# Patient Record
Sex: FEMALE | Race: WHITE | NOT HISPANIC OR LATINO | Employment: OTHER | URBAN - METROPOLITAN AREA
[De-identification: names, ages, dates, MRNs, and addresses within clinical notes are randomized per-mention and may not be internally consistent; named-entity substitution may affect disease eponyms.]

---

## 2017-03-23 ENCOUNTER — LAB (OUTPATIENT)
Dept: ENDOCRINOLOGY | Age: 62
End: 2017-03-23

## 2017-03-23 DIAGNOSIS — E03.9 HYPOTHYROIDISM: ICD-10-CM

## 2017-03-23 DIAGNOSIS — E78.5 DYSLIPIDEMIA: ICD-10-CM

## 2017-03-23 DIAGNOSIS — I10 BENIGN ESSENTIAL HYPERTENSION: ICD-10-CM

## 2017-03-23 DIAGNOSIS — E11.9 CONTROLLED TYPE 2 DIABETES MELLITUS WITHOUT COMPLICATION (HCC): ICD-10-CM

## 2017-03-23 DIAGNOSIS — E55.9 VITAMIN D DEFICIENCY: ICD-10-CM

## 2017-03-23 DIAGNOSIS — E55.9 VITAMIN D DEFICIENCY: Primary | ICD-10-CM

## 2017-03-24 LAB
25(OH)D3+25(OH)D2 SERPL-MCNC: 51.6 NG/ML (ref 30–100)
ALBUMIN SERPL-MCNC: 4.8 G/DL (ref 3.5–5.2)
ALBUMIN/GLOB SERPL: 2 G/DL
ALP SERPL-CCNC: 84 U/L (ref 39–117)
ALT SERPL-CCNC: 28 U/L (ref 1–33)
AST SERPL-CCNC: 17 U/L (ref 1–32)
BILIRUB SERPL-MCNC: 0.8 MG/DL (ref 0.1–1.2)
BUN SERPL-MCNC: 14 MG/DL (ref 8–23)
BUN/CREAT SERPL: 20.6 (ref 7–25)
C PEPTIDE SERPL-MCNC: 3.4 NG/ML (ref 1.1–4.4)
CALCIUM SERPL-MCNC: 9.8 MG/DL (ref 8.6–10.5)
CHLORIDE SERPL-SCNC: 99 MMOL/L (ref 98–107)
CHOLEST SERPL-MCNC: 259 MG/DL (ref 0–200)
CO2 SERPL-SCNC: 24.2 MMOL/L (ref 22–29)
CREAT SERPL-MCNC: 0.68 MG/DL (ref 0.57–1)
GLOBULIN SER CALC-MCNC: 2.4 GM/DL
GLUCOSE SERPL-MCNC: 197 MG/DL (ref 65–99)
HBA1C MFR BLD: 7.86 % (ref 4.8–5.6)
HDLC SERPL-MCNC: 48 MG/DL (ref 40–60)
LDLC SERPL CALC-MCNC: 173 MG/DL (ref 0–100)
MICROALBUMIN UR-MCNC: 3.2 UG/ML
POTASSIUM SERPL-SCNC: 4.2 MMOL/L (ref 3.5–5.2)
PROT SERPL-MCNC: 7.2 G/DL (ref 6–8.5)
SODIUM SERPL-SCNC: 140 MMOL/L (ref 136–145)
T3FREE SERPL-MCNC: 3.1 PG/ML (ref 2–4.4)
T4 FREE SERPL-MCNC: 1.65 NG/DL (ref 0.93–1.7)
T4 SERPL-MCNC: 9.91 MCG/DL (ref 4.5–11.7)
TRIGL SERPL-MCNC: 188 MG/DL (ref 0–150)
TSH SERPL DL<=0.005 MIU/L-ACNC: 0.27 MIU/ML (ref 0.27–4.2)
URATE SERPL-MCNC: 2.9 MG/DL (ref 2.4–5.7)
VLDLC SERPL CALC-MCNC: 37.6 MG/DL (ref 5–40)

## 2017-04-04 ENCOUNTER — OFFICE VISIT (OUTPATIENT)
Dept: ENDOCRINOLOGY | Age: 62
End: 2017-04-04

## 2017-04-04 VITALS
DIASTOLIC BLOOD PRESSURE: 68 MMHG | HEIGHT: 67 IN | WEIGHT: 155.2 LBS | BODY MASS INDEX: 24.36 KG/M2 | SYSTOLIC BLOOD PRESSURE: 138 MMHG

## 2017-04-04 DIAGNOSIS — Z79.4 CONTROLLED TYPE 2 DIABETES MELLITUS WITHOUT COMPLICATION, WITH LONG-TERM CURRENT USE OF INSULIN (HCC): Primary | ICD-10-CM

## 2017-04-04 DIAGNOSIS — I10 BENIGN ESSENTIAL HYPERTENSION: ICD-10-CM

## 2017-04-04 DIAGNOSIS — E78.5 DYSLIPIDEMIA: ICD-10-CM

## 2017-04-04 DIAGNOSIS — E11.9 CONTROLLED TYPE 2 DIABETES MELLITUS WITHOUT COMPLICATION, WITH LONG-TERM CURRENT USE OF INSULIN (HCC): Primary | ICD-10-CM

## 2017-04-04 DIAGNOSIS — E55.9 VITAMIN D DEFICIENCY: ICD-10-CM

## 2017-04-04 DIAGNOSIS — E03.9 HYPOTHYROIDISM, UNSPECIFIED TYPE: ICD-10-CM

## 2017-04-04 PROCEDURE — 99214 OFFICE O/P EST MOD 30 MIN: CPT | Performed by: NURSE PRACTITIONER

## 2017-04-04 RX ORDER — SIMVASTATIN 20 MG
20 TABLET ORAL NIGHTLY
Qty: 30 TABLET | Refills: 5 | Status: SHIPPED | OUTPATIENT
Start: 2017-04-04 | End: 2017-08-28

## 2017-04-04 RX ORDER — GLIMEPIRIDE 4 MG/1
4 TABLET ORAL
Qty: 30 TABLET | Refills: 5 | Status: SHIPPED | OUTPATIENT
Start: 2017-04-04 | End: 2017-09-26 | Stop reason: SDUPTHER

## 2017-04-04 NOTE — PATIENT INSTRUCTIONS
Change rosuvastatin to simvastatin 20 mg daily  Glimepiride 4mg 1 pill daily in the morning with breakfast  Monitor for low blood sugar  Follow up in 4 months with labs

## 2017-04-04 NOTE — PROGRESS NOTES
"Subjective   Magui Dumont is a 61 y.o. female is here today for follow-up.  Chief Complaint   Patient presents with   • Diabetes     recent labs; didnt bring BG monitor; test BG 1-2XW   • Hypothyroidism     pt is not taking crestor due to price   • Hyperlipidemia     poss want refill on proctosol and get a cheaper statin   • Hypertension   • Vitamin D Deficiency     /68  Ht 67\" (170.2 cm)  Wt 155 lb 3.2 oz (70.4 kg)  BMI 24.31 kg/m2  History reviewed. No pertinent family history.  Social History     Social History   • Marital status: Single     Spouse name: N/A   • Number of children: N/A   • Years of education: N/A     Social History Main Topics   • Smoking status: Never Smoker   • Smokeless tobacco: None   • Alcohol use No   • Drug use: Defer   • Sexual activity: Defer     Other Topics Concern   • None     Social History Narrative     History reviewed. No pertinent surgical history.  Past Medical History:   Diagnosis Date   • Hyperlipidemia    • Hypothyroidism    • Type 2 diabetes mellitus    • Vitamin D deficiency      Current Outpatient Prescriptions on File Prior to Visit   Medication Sig   • aspirin 81 MG tablet Take  by mouth daily.   • citalopram (CeleXA) 20 MG tablet Take 1 tablet by mouth daily.   • Empagliflozin (JARDIANCE) 25 MG tablet Take 1 tablet by mouth daily.   • glucose blood test strip OneTouch Verio In Vitro Strip; Patient Sig: OneTouch Verio In Vitro Strip CHECK BLOOD GLUCOSE 1 TIME A DAY AS DIRECTED; 3; 3; 01-Dec-2014; Active   • hydrocortisone 2.5 % cream Hydrocortisone 2.5 % Rectal Cream; Patient Sig: Hydrocortisone 2.5 % Rectal Cream Plan locally twice a day x2 weeks; 1; 3; 21-May-2015; Active   • JENTADUETO 2.5-1000 MG tablet Take 1 tablet by mouth 2 (two) times a day.   • levothyroxine (SYNTHROID, LEVOTHROID) 112 MCG tablet Take 1 tablet by mouth daily.   • lisinopril-hydrochlorothiazide (PRINZIDE,ZESTORETIC) 20-12.5 MG per tablet Take 1 tablet by mouth daily.   • Omega-3 Fatty " Acids (FISH OIL) 1000 MG capsule delayed-release Take  by mouth daily.   • ONETOUCH DELICA LANCETS FINE misc    • PROCTOSOL HC 2.5 % rectal cream    • vitamin D (ERGOCALCIFEROL) 37617 UNITS capsule capsule Take 1 capsule by mouth every 7 days.   • rosuvastatin (CRESTOR) 20 MG tablet Take 1 tablet by mouth daily.   • [DISCONTINUED] Doxycycline Hyclate 50 MG tablet Take  by mouth.   • [DISCONTINUED] ESTRACE VAGINAL 0.1 MG/GM vaginal cream Insert 1 g into the vagina daily.     No current facility-administered medications on file prior to visit.          History of Present Illness  Encounter Diagnoses   Name Primary?   • Controlled type 2 diabetes mellitus without complication, with long-term current use of insulin Yes   • Hypothyroidism, unspecified type    • Benign essential hypertension    • Vitamin D deficiency    • Dyslipidemia      This is a 62 y/o female patient for a follow up visit for the above-mentioned problems.  She had recent labs which were reviewed and discussed. She states she checks her sugars 1-2 times a week and that she has not been very good at checking them, but when she does check them they have bee high in the morning. She reports that she has not been taking her Crestor as prescribed because it is too expensive.  She states that it was changed to the generic rosuvastatin at her last visit, but that the medication is still too expensive.  Her  takes simvastatin and she is requesting that we change her rosuvastatin to simvastatin at this time. The Patient is reluctant to start insulin therapy at Today's visit.  Her blood pressure is stable today and she states she takes all other medications as precribed.     The following portions of the patient's history were reviewed and updated as appropriate: allergies, current medications, past family history, past medical history, past social history, past surgical history and problem list.    Review of Systems   Constitutional: Negative for  fatigue.   HENT: Negative for trouble swallowing.    Eyes: Negative for visual disturbance.   Respiratory: Negative for shortness of breath.    Cardiovascular: Negative for leg swelling.   Gastrointestinal: Negative for nausea.   Endocrine: Negative for polyuria.   Genitourinary: Negative for urgency.   Musculoskeletal: Negative for joint swelling.   Skin: Negative for wound.   Allergic/Immunologic: Negative for immunocompromised state.   Neurological: Negative for numbness.   Hematological: Negative for adenopathy.   Psychiatric/Behavioral: The patient is not nervous/anxious.        Objective   Physical Exam   Constitutional: She is oriented to person, place, and time. She appears well-developed and well-nourished. No distress.   HENT:   Head: Normocephalic and atraumatic.   Right Ear: External ear normal.   Left Ear: External ear normal.   Nose: Nose normal.   Mouth/Throat: Oropharynx is clear and moist. No oropharyngeal exudate.   Eyes: EOM are normal. Pupils are equal, round, and reactive to light. Right eye exhibits no discharge. Left eye exhibits no discharge.   Neck: Trachea normal, normal range of motion and full passive range of motion without pain. Neck supple. No tracheal tenderness present. Carotid bruit is not present. No tracheal deviation, no edema and no erythema present. No thyroid mass and no thyromegaly present.   Cardiovascular: Normal rate, regular rhythm, normal heart sounds and intact distal pulses.  Exam reveals no gallop and no friction rub.    No murmur heard.  Pulmonary/Chest: Effort normal and breath sounds normal. No stridor. No respiratory distress. She has no wheezes. She has no rales.   Abdominal: Soft. Bowel sounds are normal. She exhibits no distension.   Musculoskeletal: Normal range of motion. She exhibits no edema or deformity.   Lymphadenopathy:     She has no cervical adenopathy.   Neurological: She is alert and oriented to person, place, and time.   Skin: Skin is warm and dry.  No rash noted. She is not diaphoretic. No erythema. No pallor.   Psychiatric: She has a normal mood and affect. Her behavior is normal. Judgment and thought content normal.   Nursing note and vitals reviewed.    Results for orders placed or performed in visit on 03/23/17   Vitamin D 25 Hydroxy   Result Value Ref Range    25 Hydroxy, Vitamin D 51.6 30.0 - 100.0 ng/mL     Lab Results   Component Value Date    BUN 14 03/23/2017    CREATININE 0.68 03/23/2017    EGFRIFNONA 88 03/23/2017    EGFRIFAFRI 106 03/23/2017    BCR 20.6 03/23/2017    K 4.2 03/23/2017    CO2 24.2 03/23/2017    CALCIUM 9.8 03/23/2017    PROTENTOTREF 7.2 03/23/2017    ALBUMIN 4.80 03/23/2017    LABIL2 2.0 03/23/2017    AST 17 03/23/2017    ALT 28 03/23/2017     Lab Results   Component Value Date    HGBA1C 7.86 (H) 03/23/2017     No results found for: CHOL  Lab Results   Component Value Date    TRIG 188 (H) 03/23/2017    TRIG 159 (H) 03/03/2016    TRIG 162 (H) 06/25/2015     Lab Results   Component Value Date    HDL 48 03/23/2017    HDL 50 03/03/2016    HDL 50 06/25/2015     No results found for: LDLCALC  Lab Results   Component Value Date     (H) 03/23/2017    LDL 74 03/03/2016    LDL 71 06/25/2015     No results found for: HDLLDLRATIO  No components found for: CHOLHDL  Lab Results   Component Value Date    TSH 0.272 03/23/2017    V8UGNAI 9.91 03/23/2017         Assessment/Plan   Magui was seen today for diabetes, hypothyroidism, hyperlipidemia, hypertension and vitamin d deficiency.    Diagnoses and all orders for this visit:    Controlled type 2 diabetes mellitus without complication, with long-term current use of insulin    Hypothyroidism, unspecified type    Benign essential hypertension    Vitamin D deficiency    Dyslipidemia    Other orders  -     simvastatin (ZOCOR) 20 MG tablet; Take 1 tablet by mouth Every Night.  -     glimepiride (AMARYL) 4 MG tablet; Take 1 tablet by mouth Every Morning Before Breakfast.    In summary, the patient  was seen and examined. Her recent labs were reviewed and she was provided copy. Her hemoglobin A1c has come down from 8.2 to 7.86 on her current regimen. Since she states that her blood sugars are typically running high and she is reluctant to begin insulin therapy, I started her on glimepiride 4 mg 1 tablet every morning with breakfast.  I have warned her of the hypoglycemic risk with this medication and asked that she monitor sugars closely.  Per her request, I have changed her from rosuvastatin 20 mg to simvastatin 20 mg 1 tablet by mouth daily.  Her thyroid and vitamin D are within satisfactory range at this time she is to continue taking her current thyroid and vitamin D therapy.  Her blood pressure is stable at today's visit and I've asked that she continue all other medications.  She is to follow-up in 4 months with labs prior.  I've encouraged her to contact the office with any further questions or concerns prior to her next appointment.  Her medications at the end of the visit are as follows:    Current Outpatient Prescriptions:   •  aspirin 81 MG tablet, Take  by mouth daily., Disp: , Rfl:   •  citalopram (CeleXA) 20 MG tablet, Take 1 tablet by mouth daily., Disp: 90 tablet, Rfl: 3  •  Empagliflozin (JARDIANCE) 25 MG tablet, Take 1 tablet by mouth daily., Disp: 30 tablet, Rfl: 5  •  glucose blood test strip, OneTouch Verio In Vitro Strip; Patient Sig: OneTouch Verio In Vitro Strip CHECK BLOOD GLUCOSE 1 TIME A DAY AS DIRECTED; 3; 3; 01-Dec-2014; Active, Disp: , Rfl:   •  hydrocortisone 2.5 % cream, Hydrocortisone 2.5 % Rectal Cream; Patient Sig: Hydrocortisone 2.5 % Rectal Cream Plan locally twice a day x2 weeks; 1; 3; 21-May-2015; Active, Disp: , Rfl:   •  JENTADUETO 2.5-1000 MG tablet, Take 1 tablet by mouth 2 (two) times a day., Disp: 60 tablet, Rfl: 5  •  levothyroxine (SYNTHROID, LEVOTHROID) 112 MCG tablet, Take 1 tablet by mouth daily., Disp: 90 tablet, Rfl: 3  •  lisinopril-hydrochlorothiazide  (PRINZIDE,ZESTORETIC) 20-12.5 MG per tablet, Take 1 tablet by mouth daily., Disp: 90 tablet, Rfl: 3  •  Omega-3 Fatty Acids (FISH OIL) 1000 MG capsule delayed-release, Take  by mouth daily., Disp: , Rfl:   •  ONETOUCH DELICA LANCETS FINE misc, , Disp: , Rfl:   •  PROCTOSOL HC 2.5 % rectal cream, , Disp: , Rfl:   •  vitamin D (ERGOCALCIFEROL) 60253 UNITS capsule capsule, Take 1 capsule by mouth every 7 days., Disp: 13 capsule, Rfl: 3  •  glimepiride (AMARYL) 4 MG tablet, Take 1 tablet by mouth Every Morning Before Breakfast., Disp: 30 tablet, Rfl: 5  •  simvastatin (ZOCOR) 20 MG tablet, Take 1 tablet by mouth Every Night., Disp: 30 tablet, Rfl: 5    Change rosuvastatin to simvastatin 20 mg daily  Glimepiride 4mg 1 pill daily in the morning with breakfast  Monitor for low blood sugar  Follow up in 4 months with labs

## 2017-04-06 DIAGNOSIS — E55.9 VITAMIN D DEFICIENCY: ICD-10-CM

## 2017-04-06 DIAGNOSIS — E78.5 DYSLIPIDEMIA: ICD-10-CM

## 2017-04-06 DIAGNOSIS — I10 BENIGN ESSENTIAL HYPERTENSION: ICD-10-CM

## 2017-04-06 DIAGNOSIS — E11.9 CONTROLLED TYPE 2 DIABETES MELLITUS WITHOUT COMPLICATION (HCC): ICD-10-CM

## 2017-04-06 DIAGNOSIS — E03.9 HYPOTHYROIDISM: ICD-10-CM

## 2017-04-06 RX ORDER — ERGOCALCIFEROL 1.25 MG/1
CAPSULE ORAL
Qty: 13 CAPSULE | Refills: 0 | Status: SHIPPED | OUTPATIENT
Start: 2017-04-06 | End: 2017-06-19 | Stop reason: SDUPTHER

## 2017-04-21 DIAGNOSIS — I10 BENIGN ESSENTIAL HYPERTENSION: ICD-10-CM

## 2017-04-21 DIAGNOSIS — E78.5 DYSLIPIDEMIA: ICD-10-CM

## 2017-04-21 DIAGNOSIS — E55.9 VITAMIN D DEFICIENCY: ICD-10-CM

## 2017-04-21 DIAGNOSIS — E03.9 HYPOTHYROIDISM: ICD-10-CM

## 2017-04-21 DIAGNOSIS — E11.9 CONTROLLED TYPE 2 DIABETES MELLITUS WITHOUT COMPLICATION (HCC): ICD-10-CM

## 2017-04-24 RX ORDER — LEVOTHYROXINE SODIUM 112 UG/1
TABLET ORAL
Qty: 90 TABLET | Refills: 0 | Status: SHIPPED | OUTPATIENT
Start: 2017-04-24 | End: 2017-08-07 | Stop reason: SDUPTHER

## 2017-05-02 ENCOUNTER — PREP FOR SURGERY (OUTPATIENT)
Dept: OTHER | Facility: HOSPITAL | Age: 62
End: 2017-05-02

## 2017-05-02 DIAGNOSIS — Z12.11 SCREEN FOR COLON CANCER: Primary | ICD-10-CM

## 2017-05-02 RX ORDER — SODIUM CHLORIDE 0.9 % (FLUSH) 0.9 %
1-10 SYRINGE (ML) INJECTION AS NEEDED
Status: CANCELLED | OUTPATIENT
Start: 2017-09-15

## 2017-05-23 RX ORDER — CITALOPRAM 20 MG/1
TABLET ORAL
Qty: 90 TABLET | Refills: 0 | Status: SHIPPED | OUTPATIENT
Start: 2017-05-23 | End: 2017-10-03 | Stop reason: SDUPTHER

## 2017-05-26 ENCOUNTER — OFFICE VISIT (OUTPATIENT)
Dept: INTERNAL MEDICINE | Age: 62
End: 2017-05-26

## 2017-05-26 VITALS
SYSTOLIC BLOOD PRESSURE: 130 MMHG | HEART RATE: 92 BPM | TEMPERATURE: 97.3 F | WEIGHT: 156.4 LBS | OXYGEN SATURATION: 96 % | BODY MASS INDEX: 24.55 KG/M2 | HEIGHT: 67 IN | DIASTOLIC BLOOD PRESSURE: 74 MMHG

## 2017-05-26 DIAGNOSIS — I10 BENIGN ESSENTIAL HYPERTENSION: Primary | ICD-10-CM

## 2017-05-26 DIAGNOSIS — Z00.00 ROUTINE HEALTH MAINTENANCE: ICD-10-CM

## 2017-05-26 DIAGNOSIS — Z23 ENCOUNTER FOR IMMUNIZATION: ICD-10-CM

## 2017-05-26 PROCEDURE — 99213 OFFICE O/P EST LOW 20 MIN: CPT | Performed by: INTERNAL MEDICINE

## 2017-05-26 PROCEDURE — 90471 IMMUNIZATION ADMIN: CPT | Performed by: INTERNAL MEDICINE

## 2017-05-26 PROCEDURE — 90715 TDAP VACCINE 7 YRS/> IM: CPT | Performed by: INTERNAL MEDICINE

## 2017-06-19 DIAGNOSIS — E55.9 VITAMIN D DEFICIENCY: ICD-10-CM

## 2017-06-19 DIAGNOSIS — E78.5 DYSLIPIDEMIA: ICD-10-CM

## 2017-06-19 DIAGNOSIS — E11.9 CONTROLLED TYPE 2 DIABETES MELLITUS WITHOUT COMPLICATION, UNSPECIFIED LONG TERM INSULIN USE STATUS: ICD-10-CM

## 2017-06-19 DIAGNOSIS — E03.9 HYPOTHYROIDISM, UNSPECIFIED TYPE: ICD-10-CM

## 2017-06-19 DIAGNOSIS — I10 BENIGN ESSENTIAL HYPERTENSION: ICD-10-CM

## 2017-06-19 RX ORDER — ERGOCALCIFEROL 1.25 MG/1
CAPSULE ORAL
Qty: 13 CAPSULE | Refills: 3 | Status: SHIPPED | OUTPATIENT
Start: 2017-06-19 | End: 2018-06-30 | Stop reason: SDUPTHER

## 2017-07-21 ENCOUNTER — APPOINTMENT (OUTPATIENT)
Dept: GENERAL RADIOLOGY | Facility: HOSPITAL | Age: 62
End: 2017-07-21

## 2017-07-21 PROCEDURE — 73030 X-RAY EXAM OF SHOULDER: CPT | Performed by: GENERAL PRACTICE

## 2017-07-31 ENCOUNTER — TELEPHONE (OUTPATIENT)
Dept: INTERNAL MEDICINE | Age: 62
End: 2017-07-31

## 2017-07-31 ENCOUNTER — CLINICAL SUPPORT (OUTPATIENT)
Dept: INTERNAL MEDICINE | Age: 62
End: 2017-07-31

## 2017-07-31 DIAGNOSIS — Z00.00 HEALTH CARE MAINTENANCE: Primary | ICD-10-CM

## 2017-07-31 DIAGNOSIS — Z00.00 ROUTINE HEALTH MAINTENANCE: Primary | ICD-10-CM

## 2017-07-31 PROCEDURE — 86580 TB INTRADERMAL TEST: CPT | Performed by: INTERNAL MEDICINE

## 2017-07-31 NOTE — TELEPHONE ENCOUNTER
NATI Ms. Nieves was instructed by me to call pt and place her on [facility] for TB test per Dr. Dumont  Ms. Nieves verbalized understanding. MM

## 2017-07-31 NOTE — TELEPHONE ENCOUNTER
I spoke c pt myself after clarification of cell number. Pt will be here today between [id number]. Order will be placed in system. TYRELL

## 2017-08-02 ENCOUNTER — CLINICAL SUPPORT (OUTPATIENT)
Dept: INTERNAL MEDICINE | Age: 62
End: 2017-08-02

## 2017-08-02 DIAGNOSIS — Z00.00 ROUTINE HEALTH MAINTENANCE: Primary | ICD-10-CM

## 2017-08-02 LAB
INDURATION: 0 MM (ref 0–10)
TB SKIN TEST: NEGATIVE

## 2017-08-04 DIAGNOSIS — E55.9 VITAMIN D DEFICIENCY: ICD-10-CM

## 2017-08-04 DIAGNOSIS — E03.9 HYPOTHYROIDISM, UNSPECIFIED TYPE: ICD-10-CM

## 2017-08-04 DIAGNOSIS — Z79.4 CONTROLLED TYPE 2 DIABETES MELLITUS WITHOUT COMPLICATION, WITH LONG-TERM CURRENT USE OF INSULIN (HCC): Primary | ICD-10-CM

## 2017-08-04 DIAGNOSIS — E11.9 CONTROLLED TYPE 2 DIABETES MELLITUS WITHOUT COMPLICATION, WITH LONG-TERM CURRENT USE OF INSULIN (HCC): Primary | ICD-10-CM

## 2017-08-07 DIAGNOSIS — E55.9 VITAMIN D DEFICIENCY: ICD-10-CM

## 2017-08-07 DIAGNOSIS — I10 BENIGN ESSENTIAL HYPERTENSION: ICD-10-CM

## 2017-08-07 DIAGNOSIS — E11.9 CONTROLLED TYPE 2 DIABETES MELLITUS WITHOUT COMPLICATION (HCC): ICD-10-CM

## 2017-08-07 DIAGNOSIS — E03.9 HYPOTHYROIDISM: ICD-10-CM

## 2017-08-07 DIAGNOSIS — E78.5 DYSLIPIDEMIA: ICD-10-CM

## 2017-08-07 RX ORDER — LEVOTHYROXINE SODIUM 112 UG/1
TABLET ORAL
Qty: 90 TABLET | Refills: 0 | Status: SHIPPED | OUTPATIENT
Start: 2017-08-07 | End: 2017-11-01 | Stop reason: SDUPTHER

## 2017-08-25 ENCOUNTER — OFFICE VISIT (OUTPATIENT)
Dept: ENDOCRINOLOGY | Age: 62
End: 2017-08-25

## 2017-08-25 VITALS
WEIGHT: 153.2 LBS | RESPIRATION RATE: 16 BRPM | BODY MASS INDEX: 24.04 KG/M2 | DIASTOLIC BLOOD PRESSURE: 72 MMHG | HEIGHT: 67 IN | SYSTOLIC BLOOD PRESSURE: 122 MMHG

## 2017-08-25 DIAGNOSIS — I10 BENIGN ESSENTIAL HYPERTENSION: ICD-10-CM

## 2017-08-25 DIAGNOSIS — E03.9 HYPOTHYROIDISM, UNSPECIFIED TYPE: ICD-10-CM

## 2017-08-25 DIAGNOSIS — E55.9 VITAMIN D DEFICIENCY: ICD-10-CM

## 2017-08-25 DIAGNOSIS — E78.5 DYSLIPIDEMIA: ICD-10-CM

## 2017-08-25 DIAGNOSIS — Z79.4 CONTROLLED TYPE 2 DIABETES MELLITUS WITHOUT COMPLICATION, WITH LONG-TERM CURRENT USE OF INSULIN (HCC): Primary | ICD-10-CM

## 2017-08-25 DIAGNOSIS — E11.9 CONTROLLED TYPE 2 DIABETES MELLITUS WITHOUT COMPLICATION, WITH LONG-TERM CURRENT USE OF INSULIN (HCC): Primary | ICD-10-CM

## 2017-08-25 PROCEDURE — 99214 OFFICE O/P EST MOD 30 MIN: CPT | Performed by: NURSE PRACTITIONER

## 2017-08-25 NOTE — PROGRESS NOTES
"Subjective   Magui Dumont is a 61 y.o. female is here today for follow-up.  Chief Complaint   Patient presents with   • Hyperlipidemia   • Hypertension   • Diabetes     checking BG 1-2 times a week; denies problems or concerns; no lab review   • Vitamin D Deficiency     /72  Resp 16  Ht 67\" (170.2 cm)  Wt 153 lb 3.2 oz (69.5 kg)  BMI 23.99 kg/m2  Current Outpatient Prescriptions on File Prior to Visit   Medication Sig   • aspirin 81 MG tablet Take  by mouth daily.   • citalopram (CeleXA) 20 MG tablet TAKE ONE TABLET BY MOUTH ONCE DAILY   • Empagliflozin (JARDIANCE) 25 MG tablet Take 1 tablet by mouth daily.   • glimepiride (AMARYL) 4 MG tablet Take 1 tablet by mouth Every Morning Before Breakfast.   • glucose blood test strip OneTouch Verio In Vitro Strip; Patient Sig: OneTouch Verio In Vitro Strip CHECK BLOOD GLUCOSE 1 TIME A DAY AS DIRECTED; 3; 3; 01-Dec-2014; Active   • hydrocortisone 2.5 % cream Hydrocortisone 2.5 % Rectal Cream; Patient Sig: Hydrocortisone 2.5 % Rectal Cream Plan locally twice a day x2 weeks; 1; 3; 21-May-2015; Active   • JENTADUETO 2.5-1000 MG tablet Take 1 tablet by mouth 2 (two) times a day.   • levothyroxine (SYNTHROID, LEVOTHROID) 112 MCG tablet TAKE ONE TABLET BY MOUTH ONCE DAILY   • lisinopril-hydrochlorothiazide (PRINZIDE,ZESTORETIC) 20-12.5 MG per tablet Take 1 tablet by mouth daily.   • Omega-3 Fatty Acids (FISH OIL) 1000 MG capsule delayed-release Take  by mouth daily.   • ONETOUCH DELICA LANCETS FINE misc    • PROCTOSOL HC 2.5 % rectal cream Insert  into the rectum 2 (Two) Times a Day.   • simvastatin (ZOCOR) 20 MG tablet Take 1 tablet by mouth Every Night.   • vitamin D (ERGOCALCIFEROL) 25606 UNITS capsule capsule Take one capsule by mouth once a week     No current facility-administered medications on file prior to visit.          History of Present Illness  Encounter Diagnoses   Name Primary?   • Controlled type 2 diabetes mellitus without complication, with long-term " current use of insulin Yes   • Vitamin D deficiency    • Benign essential hypertension    • Hypothyroidism, unspecified type    • Dyslipidemia      This is a 61-year-old female patient here today for routine follow-up visit.  She has been seen for the above-mentioned problems.  She is taking her medications as prescribed.  Her brother  2 weeks ago and she is mourning his loss.  She is taking her medications as prescribed.  She has no specific complaints at today's visit.  She has had no recent labs done  The following portions of the patient's history were reviewed and updated as appropriate: allergies, current medications, past family history, past medical history, past social history, past surgical history and problem list.    Review of Systems   Constitutional: Negative for fatigue.   Gastrointestinal: Negative for constipation.   Endocrine: Negative for heat intolerance.   Psychiatric/Behavioral: Negative for sleep disturbance.       Objective   Physical Exam   Constitutional: She is oriented to person, place, and time. She appears well-developed and well-nourished. No distress.   HENT:   Head: Normocephalic and atraumatic.   Right Ear: External ear normal.   Left Ear: External ear normal.   Nose: Nose normal.   Mouth/Throat: Oropharynx is clear and moist. No oropharyngeal exudate.   Eyes: EOM are normal. Pupils are equal, round, and reactive to light. Right eye exhibits no discharge. Left eye exhibits no discharge.   Neck: Trachea normal, normal range of motion and full passive range of motion without pain. Neck supple. No tracheal tenderness present. Carotid bruit is not present. No tracheal deviation, no edema and no erythema present. No thyroid mass and no thyromegaly present.   Cardiovascular: Normal rate, regular rhythm, normal heart sounds and intact distal pulses.  Exam reveals no gallop and no friction rub.    No murmur heard.  Pulmonary/Chest: Effort normal and breath sounds normal. No stridor. No  respiratory distress. She has no wheezes. She has no rales.   Abdominal: Soft. Bowel sounds are normal. She exhibits no distension.   Musculoskeletal: Normal range of motion. She exhibits no edema or deformity.   Lymphadenopathy:     She has no cervical adenopathy.   Neurological: She is alert and oriented to person, place, and time.   Skin: Skin is warm and dry. No rash noted. She is not diaphoretic. No erythema. No pallor.   Psychiatric: She has a normal mood and affect. Her behavior is normal. Judgment and thought content normal.   Nursing note and vitals reviewed.    Lab Results   Component Value Date    BUN 14 03/23/2017    CREATININE 0.68 03/23/2017    EGFRIFNONA 88 03/23/2017    EGFRIFAFRI 106 03/23/2017    BCR 20.6 03/23/2017    K 4.2 03/23/2017    CO2 24.2 03/23/2017    CALCIUM 9.8 03/23/2017    PROTENTOTREF 7.2 03/23/2017    ALBUMIN 4.80 03/23/2017    LABIL2 2.0 03/23/2017    AST 17 03/23/2017    ALT 28 03/23/2017     No results found for: CHOL  Lab Results   Component Value Date    TRIG 188 (H) 03/23/2017    TRIG 159 (H) 03/03/2016    TRIG 162 (H) 06/25/2015     Lab Results   Component Value Date    HDL 48 03/23/2017    HDL 50 03/03/2016    HDL 50 06/25/2015     No results found for: LDLCALC  Lab Results   Component Value Date     (H) 03/23/2017    LDL 74 03/03/2016    LDL 71 06/25/2015     No results found for: HDLLDLRATIO  No components found for: CHOLHDL    Lab Results   Component Value Date    TSH 0.272 03/23/2017       Assessment/Plan   Magui was seen today for hyperlipidemia, hypertension, diabetes and vitamin d deficiency.    Diagnoses and all orders for this visit:    Controlled type 2 diabetes mellitus without complication, with long-term current use of insulin    Vitamin D deficiency    Benign essential hypertension    Hypothyroidism, unspecified type    Dyslipidemia      In summary, patient was seen and examined.  She will have extensive laboratory evaluation done today and she'll be  notified of the results along with any further recommendations.  Encourage her to contact the office should she have any questions or concerns prior to her next appointment in 6 months.  At about that she is stable

## 2017-08-26 LAB
25(OH)D3+25(OH)D2 SERPL-MCNC: 40.4 NG/ML (ref 30–100)
ALBUMIN SERPL-MCNC: 5 G/DL (ref 3.5–5.2)
ALBUMIN/GLOB SERPL: 2 G/DL
ALP SERPL-CCNC: 90 U/L (ref 39–117)
ALT SERPL-CCNC: 23 U/L (ref 1–33)
AST SERPL-CCNC: 13 U/L (ref 1–32)
BILIRUB SERPL-MCNC: 0.7 MG/DL (ref 0.1–1.2)
BUN SERPL-MCNC: 12 MG/DL (ref 8–23)
BUN/CREAT SERPL: 16.4 (ref 7–25)
C PEPTIDE SERPL-MCNC: 2.8 NG/ML (ref 1.1–4.4)
CALCIUM SERPL-MCNC: 9.9 MG/DL (ref 8.6–10.5)
CHLORIDE SERPL-SCNC: 99 MMOL/L (ref 98–107)
CHOLEST SERPL-MCNC: 245 MG/DL (ref 0–200)
CO2 SERPL-SCNC: 27 MMOL/L (ref 22–29)
CREAT SERPL-MCNC: 0.73 MG/DL (ref 0.57–1)
FT4I SERPL CALC-MCNC: 3 (ref 1.2–4.9)
GLOBULIN SER CALC-MCNC: 2.5 GM/DL
GLUCOSE SERPL-MCNC: 127 MG/DL (ref 65–99)
HBA1C MFR BLD: 8.1 % (ref 4.8–5.6)
HDLC SERPL-MCNC: 50 MG/DL (ref 40–60)
LDLC SERPL CALC-MCNC: 144 MG/DL (ref 0–100)
MICROALBUMIN UR-MCNC: <3 UG/ML
POTASSIUM SERPL-SCNC: 3.8 MMOL/L (ref 3.5–5.2)
PROT SERPL-MCNC: 7.5 G/DL (ref 6–8.5)
SODIUM SERPL-SCNC: 140 MMOL/L (ref 136–145)
T3FREE SERPL-MCNC: 3 PG/ML (ref 2–4.4)
T3RU NFR SERPL: 28 % (ref 24–39)
T4 FREE SERPL-MCNC: 1.81 NG/DL (ref 0.93–1.7)
T4 SERPL-MCNC: 10.7 UG/DL (ref 4.5–12)
TRIGL SERPL-MCNC: 257 MG/DL (ref 0–150)
TSH SERPL DL<=0.005 MIU/L-ACNC: 0.6 UIU/ML (ref 0.45–4.5)
VLDLC SERPL CALC-MCNC: 51.4 MG/DL (ref 5–40)

## 2017-08-28 DIAGNOSIS — E55.9 VITAMIN D DEFICIENCY: ICD-10-CM

## 2017-08-28 DIAGNOSIS — I10 BENIGN ESSENTIAL HYPERTENSION: ICD-10-CM

## 2017-08-28 DIAGNOSIS — E78.5 DYSLIPIDEMIA: ICD-10-CM

## 2017-08-28 DIAGNOSIS — E11.9 CONTROLLED TYPE 2 DIABETES MELLITUS WITHOUT COMPLICATION, UNSPECIFIED LONG TERM INSULIN USE STATUS: ICD-10-CM

## 2017-08-28 RX ORDER — INSULIN GLARGINE 300 U/ML
10 INJECTION, SOLUTION SUBCUTANEOUS DAILY
Qty: 3 PEN | Refills: 3 | Status: SHIPPED | OUTPATIENT
Start: 2017-08-28 | End: 2018-04-11 | Stop reason: SDUPTHER

## 2017-08-28 RX ORDER — LISINOPRIL AND HYDROCHLOROTHIAZIDE 20; 12.5 MG/1; MG/1
1 TABLET ORAL DAILY
Qty: 90 TABLET | Refills: 3 | Status: SHIPPED | OUTPATIENT
Start: 2017-08-28 | End: 2018-12-06 | Stop reason: SDUPTHER

## 2017-08-28 RX ORDER — ROSUVASTATIN CALCIUM 40 MG/1
40 TABLET, COATED ORAL DAILY
Qty: 50 TABLET | Refills: 5 | Status: SHIPPED | OUTPATIENT
Start: 2017-08-28 | End: 2018-12-16 | Stop reason: SDUPTHER

## 2017-09-04 ENCOUNTER — RESULTS ENCOUNTER (OUTPATIENT)
Dept: ENDOCRINOLOGY | Age: 62
End: 2017-09-04

## 2017-09-04 DIAGNOSIS — E11.9 CONTROLLED TYPE 2 DIABETES MELLITUS WITHOUT COMPLICATION, WITH LONG-TERM CURRENT USE OF INSULIN (HCC): ICD-10-CM

## 2017-09-04 DIAGNOSIS — Z79.4 CONTROLLED TYPE 2 DIABETES MELLITUS WITHOUT COMPLICATION, WITH LONG-TERM CURRENT USE OF INSULIN (HCC): ICD-10-CM

## 2017-09-04 DIAGNOSIS — E03.9 HYPOTHYROIDISM, UNSPECIFIED TYPE: ICD-10-CM

## 2017-09-04 DIAGNOSIS — E55.9 VITAMIN D DEFICIENCY: ICD-10-CM

## 2017-09-15 ENCOUNTER — ANESTHESIA EVENT (OUTPATIENT)
Dept: GASTROENTEROLOGY | Facility: HOSPITAL | Age: 62
End: 2017-09-15

## 2017-09-15 ENCOUNTER — HOSPITAL ENCOUNTER (OUTPATIENT)
Facility: HOSPITAL | Age: 62
Setting detail: HOSPITAL OUTPATIENT SURGERY
Discharge: HOME | End: 2017-09-15
Attending: INTERNAL MEDICINE | Admitting: INTERNAL MEDICINE

## 2017-09-15 ENCOUNTER — ANESTHESIA (OUTPATIENT)
Dept: GASTROENTEROLOGY | Facility: HOSPITAL | Age: 62
End: 2017-09-15

## 2017-09-15 VITALS
HEART RATE: 76 BPM | BODY MASS INDEX: 23.4 KG/M2 | HEIGHT: 67 IN | WEIGHT: 149.13 LBS | DIASTOLIC BLOOD PRESSURE: 78 MMHG | RESPIRATION RATE: 18 BRPM | SYSTOLIC BLOOD PRESSURE: 136 MMHG | OXYGEN SATURATION: 98 % | TEMPERATURE: 98 F

## 2017-09-15 DIAGNOSIS — Z12.11 SCREEN FOR COLON CANCER: ICD-10-CM

## 2017-09-15 LAB — GLUCOSE BLDC GLUCOMTR-MCNC: 167 MG/DL (ref 70–130)

## 2017-09-15 PROCEDURE — 45378 DIAGNOSTIC COLONOSCOPY: CPT | Performed by: INTERNAL MEDICINE

## 2017-09-15 PROCEDURE — 82962 GLUCOSE BLOOD TEST: CPT

## 2017-09-15 PROCEDURE — 25010000002 PROPOFOL 10 MG/ML EMULSION: Performed by: ANESTHESIOLOGY

## 2017-09-15 PROCEDURE — 25010000002 PROPOFOL 1000 MG/ML EMULSION: Performed by: ANESTHESIOLOGY

## 2017-09-15 RX ORDER — SODIUM CHLORIDE 0.9 % (FLUSH) 0.9 %
3 SYRINGE (ML) INJECTION AS NEEDED
Status: DISCONTINUED | OUTPATIENT
Start: 2017-09-15 | End: 2017-09-15 | Stop reason: HOSPADM

## 2017-09-15 RX ORDER — LIDOCAINE HYDROCHLORIDE 20 MG/ML
INJECTION, SOLUTION INFILTRATION; PERINEURAL AS NEEDED
Status: DISCONTINUED | OUTPATIENT
Start: 2017-09-15 | End: 2017-09-15 | Stop reason: SURG

## 2017-09-15 RX ORDER — SODIUM CHLORIDE 0.9 % (FLUSH) 0.9 %
1-10 SYRINGE (ML) INJECTION AS NEEDED
Status: DISCONTINUED | OUTPATIENT
Start: 2017-09-15 | End: 2017-09-15 | Stop reason: HOSPADM

## 2017-09-15 RX ORDER — PROPOFOL 10 MG/ML
VIAL (ML) INTRAVENOUS AS NEEDED
Status: DISCONTINUED | OUTPATIENT
Start: 2017-09-15 | End: 2017-09-15 | Stop reason: SURG

## 2017-09-15 RX ORDER — SODIUM CHLORIDE, SODIUM LACTATE, POTASSIUM CHLORIDE, CALCIUM CHLORIDE 600; 310; 30; 20 MG/100ML; MG/100ML; MG/100ML; MG/100ML
1000 INJECTION, SOLUTION INTRAVENOUS CONTINUOUS PRN
Status: DISCONTINUED | OUTPATIENT
Start: 2017-09-15 | End: 2017-09-15 | Stop reason: HOSPADM

## 2017-09-15 RX ADMIN — PROPOFOL 140 MCG/KG/MIN: 10 INJECTION, EMULSION INTRAVENOUS at 10:11

## 2017-09-15 RX ADMIN — LIDOCAINE HYDROCHLORIDE 50 MG: 20 INJECTION, SOLUTION INFILTRATION; PERINEURAL at 10:11

## 2017-09-15 RX ADMIN — SODIUM CHLORIDE, POTASSIUM CHLORIDE, SODIUM LACTATE AND CALCIUM CHLORIDE 1000 ML: 600; 310; 30; 20 INJECTION, SOLUTION INTRAVENOUS at 09:55

## 2017-09-15 RX ADMIN — PROPOFOL 140 MG: 10 INJECTION, EMULSION INTRAVENOUS at 10:11

## 2017-09-15 NOTE — PLAN OF CARE
Problem: Patient Care Overview (Adult)  Goal: Plan of Care Review  Outcome: Ongoing (interventions implemented as appropriate)    09/15/17 0925   Coping/Psychosocial Response Interventions   Plan Of Care Reviewed With patient   Patient Care Overview   Progress progress toward functional goals as expected       Goal: Adult Individualization and Mutuality  Outcome: Ongoing (interventions implemented as appropriate)  Goal: Discharge Needs Assessment  Outcome: Ongoing (interventions implemented as appropriate)    09/15/17 0925   Discharge Needs Assessment   Concerns To Be Addressed no discharge needs identified   Discharge Disposition home or self-care   Living Environment   Transportation Available car;family or friend will provide         Problem: GI Endoscopy (Adult)  Goal: Signs and Symptoms of Listed Potential Problems Will be Absent or Manageable (GI Endoscopy)  Outcome: Ongoing (interventions implemented as appropriate)    09/15/17 0925   GI Endoscopy   Problems Assessed (GI Endoscopy) all   Problems Present (GI Endoscopy) none

## 2017-09-15 NOTE — ANESTHESIA POSTPROCEDURE EVALUATION
"Patient: Magui Dumont    Procedure Summary     Date Anesthesia Start Anesthesia Stop Room / Location    09/15/17 1006 1030  [facility] /  [facility]       Procedure Diagnosis Surgeon Provider    COLONOSCOPY (N/A ) Screen for colon cancer  (Screen for colon cancer [Z12.11]) MD Tiffany Mitchell MD          Anesthesia Type: MAC  Last vitals  BP   136/78 (09/15/17 1110)    Temp        Pulse   76 (09/15/17 1110)   Resp   18 (09/15/17 1110)    SpO2   98 % (09/15/17 1110)      Post Anesthesia Care and Evaluation    Patient location during evaluation: bedside  Patient participation: complete - patient participated  Level of consciousness: awake and alert  Pain management: adequate  Airway patency: patent  Anesthetic complications: No anesthetic complications  PONV Status: none  Cardiovascular status: acceptable  Respiratory status: acceptable  Hydration status: acceptable    Comments: /78  Pulse 76  Temp 36.7 °C (98 °F) (Oral)   Resp 18  Ht 67\" (170.2 cm)  Wt 149 lb 2 oz (67.6 kg)  SpO2 98%  BMI 23.36 kg/m2        "

## 2017-09-15 NOTE — ANESTHESIA PREPROCEDURE EVALUATION
Anesthesia Evaluation     Patient summary reviewed   NPO Solid Status: > 8 hours  NPO Liquid Status: > 6 hours     Airway   Mallampati: II  TM distance: >3 FB  Dental      Pulmonary    Cardiovascular     Rhythm: regular  Rate: normal    (+) hypertension, hyperlipidemia      Neuro/Psych  (+) psychiatric history Depression,    GI/Hepatic/Renal/Endo    (+)  diabetes mellitus using insulin, hypothyroidism,     Musculoskeletal     Abdominal    Substance History      OB/GYN          Other                                        Anesthesia Plan    ASA 3     MAC   total IV anesthesia  Anesthetic plan and risks discussed with patient.

## 2017-09-15 NOTE — ANESTHESIA POSTPROCEDURE EVALUATION
Patient: Magui Dumont    Procedure Summary     Date Anesthesia Start Anesthesia Stop Room / Location    09/15/17 1006 1030  [facility] /  [facility]       Procedure Diagnosis Surgeon Provider    COLONOSCOPY (N/A ) Screen for colon cancer  (Screen for colon cancer [Z12.11]) MD Tiffany Mitchell MD          Anesthesia Type: MAC  Last vitals  BP   109/68 (09/15/17 1042)    Temp        Pulse   75 (09/15/17 1042)   Resp   18 (09/15/17 1042)    SpO2   97 % (09/15/17 1042)      Post Anesthesia Care and Evaluation    Patient location during evaluation: PHASE II  Patient participation: complete - patient participated  Level of consciousness: awake and alert  Pain management: adequate  Airway patency: patent  Anesthetic complications: No anesthetic complications  PONV Status: none  Cardiovascular status: acceptable  Respiratory status: acceptable  Hydration status: acceptable

## 2017-09-15 NOTE — H&P
[facility]  Pre Procedure History & Physical    Chief Complaint:   Time for my screening colonoscopy    Subjective     HPI:   61 y.o. female here for screening colonoscopy.    Past Medical History:   Past Medical History:   Diagnosis Date   • Hyperlipidemia    • Hypertension    • Hypothyroidism    • Type 2 diabetes mellitus    • Vitamin D deficiency        Family History:  History reviewed. No pertinent family history.    Social History:   reports that she has never smoked. She does not have any smokeless tobacco history on file. She reports that she does not drink alcohol or use illicit drugs.    Medications:   Prescriptions Prior to Admission   Medication Sig Dispense Refill Last Dose   • Insulin Pen Needle (BD PEN NEEDLE JUDIE U/F) 32G X 4 MM misc Use as directed to inject insulin once daily. 100 each 1 9/15/2017 at Unknown time   • ONETOUCH DELICA LANCETS FINE misc    9/15/2017 at Unknown time   • PROCTOSOL HC 2.5 % rectal cream Insert  into the rectum 2 (Two) Times a Day. 28.35 g 2 Past Week at Unknown time   • aspirin 81 MG tablet Take  by mouth daily.   9/8/2017   • citalopram (CeleXA) 20 MG tablet TAKE ONE TABLET BY MOUTH ONCE DAILY 90 tablet 0 9/13/2017   • Empagliflozin (JARDIANCE) 25 MG tablet Take 1 tablet by mouth daily. 30 tablet 5 9/13/2017   • glimepiride (AMARYL) 4 MG tablet Take 1 tablet by mouth Every Morning Before Breakfast. 30 tablet 5 9/13/2017   • glucose blood test strip OneTouch Verio In Vitro Strip; Patient Sig: OneTouch Verio In Vitro Strip CHECK BLOOD GLUCOSE 1 TIME A DAY AS DIRECTED; 3; 3; 01-Dec-2014; Active   9/15/2017   • JENTADUETO 2.5-1000 MG tablet Take 1 tablet by mouth 2 (two) times a day. 60 tablet 5 9/13/2017   • levothyroxine (SYNTHROID, LEVOTHROID) 112 MCG tablet TAKE ONE TABLET BY MOUTH ONCE DAILY 90 tablet 0 9/13/2017   • lisinopril-hydrochlorothiazide (PRINZIDE,ZESTORETIC) 20-12.5 MG per tablet Take 1 tablet by mouth Daily. 90 tablet 3 9/13/2017  "  • Omega-3 Fatty Acids (FISH OIL) 1000 MG capsule delayed-release Take  by mouth daily.   9/13/2017   • rosuvastatin (CRESTOR) 40 MG tablet Take 1 tablet by mouth Daily. 50 tablet 5 9/13/2017   • TOUJEO SOLOSTAR 300 UNIT/ML solution pen-injector Inject 10 Units under the skin Daily. 3 pen 3 9/13/2017   • vitamin D (ERGOCALCIFEROL) 13122 UNITS capsule capsule Take one capsule by mouth once a week 13 capsule 3 9/13/2017       Allergies:  Review of patient's allergies indicates no known allergies.    ROS:    Pertinent items are noted in HPI     Objective     Blood pressure 135/80, pulse 86, temperature 98 °F (36.7 °C), temperature source Oral, resp. rate 20, height 67\" (170.2 cm), weight 149 lb 2 oz (67.6 kg), SpO2 98 %.    Physical Exam   Constitutional: Pt is oriented to person, place, and time and well-developed, well-nourished, and in no distress.   HENT:   Mouth/Throat: Oropharynx is clear and moist.   Neck: Normal range of motion. Neck supple.   Cardiovascular: Normal rate, regular rhythm and normal heart sounds.    Pulmonary/Chest: Effort normal and breath sounds normal. No respiratory distress. No  wheezes.   Abdominal: Soft. Bowel sounds are normal.   Skin: Skin is warm and dry.   Psychiatric: Mood, memory, affect and judgment normal.     Assessment/Plan     Diagnosis:  61 y.o. female here for screening colonoscopy.        Anticipated Surgical Procedure:  Colonoscopy    The risks, benefits, and alternatives of this procedure have been discussed with the patient or the responsible party- the patient understands and agrees to proceed.                                                                  "

## 2017-09-27 RX ORDER — GLIMEPIRIDE 4 MG/1
TABLET ORAL
Qty: 30 TABLET | Refills: 5 | Status: SHIPPED | OUTPATIENT
Start: 2017-09-27 | End: 2018-06-22

## 2017-10-03 RX ORDER — CITALOPRAM 20 MG/1
TABLET ORAL
Qty: 90 TABLET | Refills: 0 | Status: SHIPPED | OUTPATIENT
Start: 2017-10-03 | End: 2018-06-30 | Stop reason: SDUPTHER

## 2017-11-01 DIAGNOSIS — E03.9 HYPOTHYROIDISM: ICD-10-CM

## 2017-11-01 DIAGNOSIS — E55.9 VITAMIN D DEFICIENCY: ICD-10-CM

## 2017-11-01 DIAGNOSIS — E11.9 CONTROLLED TYPE 2 DIABETES MELLITUS WITHOUT COMPLICATION (HCC): ICD-10-CM

## 2017-11-01 DIAGNOSIS — E78.5 DYSLIPIDEMIA: ICD-10-CM

## 2017-11-01 DIAGNOSIS — I10 BENIGN ESSENTIAL HYPERTENSION: ICD-10-CM

## 2017-11-01 RX ORDER — LEVOTHYROXINE SODIUM 112 UG/1
TABLET ORAL
Qty: 90 TABLET | Refills: 0 | Status: SHIPPED | OUTPATIENT
Start: 2017-11-01 | End: 2018-03-01 | Stop reason: SDUPTHER

## 2017-11-30 ENCOUNTER — APPOINTMENT (OUTPATIENT)
Dept: WOMENS IMAGING | Facility: HOSPITAL | Age: 62
End: 2017-11-30

## 2017-11-30 PROCEDURE — 77063 BREAST TOMOSYNTHESIS BI: CPT | Performed by: RADIOLOGY

## 2017-11-30 PROCEDURE — G0202 SCR MAMMO BI INCL CAD: HCPCS | Performed by: RADIOLOGY

## 2017-11-30 PROCEDURE — 77067 SCR MAMMO BI INCL CAD: CPT | Performed by: RADIOLOGY

## 2017-12-14 ENCOUNTER — OFFICE VISIT (OUTPATIENT)
Dept: INTERNAL MEDICINE | Age: 62
End: 2017-12-14

## 2017-12-14 VITALS
WEIGHT: 151 LBS | HEART RATE: 87 BPM | SYSTOLIC BLOOD PRESSURE: 114 MMHG | TEMPERATURE: 98.2 F | BODY MASS INDEX: 23.7 KG/M2 | DIASTOLIC BLOOD PRESSURE: 70 MMHG | HEIGHT: 67 IN | OXYGEN SATURATION: 98 %

## 2017-12-14 DIAGNOSIS — Z00.00 ROUTINE HEALTH MAINTENANCE: Primary | ICD-10-CM

## 2017-12-14 PROCEDURE — 99396 PREV VISIT EST AGE 40-64: CPT | Performed by: INTERNAL MEDICINE

## 2017-12-14 NOTE — PROGRESS NOTES
"    Magui Dumont / 62 y.o. / female  12/14/2017  VITALS    Visit Vitals   • /70   • Pulse 87   • Temp 98.2 °F (36.8 °C)   • Ht 170.2 cm (67.01\")   • Wt 68.5 kg (151 lb)   • SpO2 98%   • BMI 23.64 kg/m2       BP Readings from Last 3 Encounters:   12/14/17 114/70   09/15/17 136/78   08/25/17 122/72     Wt Readings from Last 3 Encounters:   12/14/17 68.5 kg (151 lb)   09/15/17 67.6 kg (149 lb 2 oz)   08/25/17 69.5 kg (153 lb 3.2 oz)      Body mass index is 23.64 kg/(m^2).    CC:  Main reason(s) for today's visit: Annual Exam (CPE)      HPI:     Patient presents today for annual physical exam.    Health maintenance: Last ophthalmology visit April 2017, follow-up scheduled for this April.  Dentist: Within the past 6 months.  Exercise: Walking 5 times weekly at least 10,000 steps.  Gynecology February 2017.    Laboratory done August 2017, CMP normal, A1c 8.1, thyroid function normal, LDL cholesterol 144, total 245, HDL 50, vitamin D 40.4.    Patient Care Team:  Sherman Aldrich MD as PCP - General (Internal Medicine)  Harsh Velasco MD as Consulting Physician (Obstetrics and Gynecology)  ____________________________________________________________________    ASSESSMENT & PLAN:    Problem List Items Addressed This Visit        Unprioritized    Routine health maintenance - Primary    Relevant Orders    Hepatitis C Antibody    CBC & Differential        Orders Placed This Encounter   Procedures   • Hepatitis C Antibody   • CBC & Differential       Summary/Discussion:  · Number-one routine health maintenance, clinically stable, see comments above and below.  Laboratory is all up to date with the exception of CBC and hepatitis C antibodies, those will be accomplished today.  Recommend repeat exam one year.  Since she is seeing endocrinology at least 2-3 times a year, they are monitoring her blood pressure is well, we can see her once yearly.    Return in about 1 year (around 12/14/2018) for Annual physical.    Future " Appointments  Date Time Provider Department Center   1/26/2018 10:40 AM [facility] None   2/9/2018 10:40 AM [facility] None       ______________________________________________________    REVIEW OF SYSTEMS    Review of Systems   Constitutional: Negative for appetite change, chills, diaphoresis, fatigue and fever.   HENT: Negative for hearing loss and trouble swallowing.    Eyes: Negative for visual disturbance.   Respiratory: Negative for cough, chest tightness, shortness of breath and wheezing.    Cardiovascular: Negative for chest pain, palpitations and leg swelling.   Gastrointestinal: Negative for abdominal pain, constipation, diarrhea, nausea and vomiting.   Endocrine: Negative for cold intolerance, heat intolerance, polydipsia, polyphagia and polyuria.   Genitourinary: Negative for dysuria, frequency and hematuria.   Musculoskeletal: Negative for joint swelling and myalgias.   Skin: Negative for color change and rash.   Allergic/Immunologic: Negative for immunocompromised state.   Neurological: Negative for dizziness, syncope, weakness, light-headedness, numbness and headaches.   Hematological: Negative for adenopathy. Does not bruise/bleed easily.   Psychiatric/Behavioral: Negative.          PHYSICAL EXAMINATION    Physical Exam   Constitutional: She is oriented to person, place, and time. She appears well-developed and well-nourished. No distress.   HENT:   Head: Normocephalic and atraumatic.   Right Ear: Tympanic membrane, external ear and ear canal normal.   Left Ear: Tympanic membrane, external ear and ear canal normal.   Mouth/Throat: Uvula is midline, oropharynx is clear and moist and mucous membranes are normal.   Eyes: Conjunctivae and EOM are normal. Pupils are equal, round, and reactive to light.   Neck: Normal range of motion. Neck supple. No JVD present. Carotid bruit is not present. No thyromegaly present.   Cardiovascular: Normal rate, regular rhythm,  normal heart sounds and intact distal pulses.  Exam reveals no gallop and no friction rub.    No murmur heard.  Pulmonary/Chest: Effort normal and breath sounds normal. She has no wheezes. She has no rales.   Abdominal: Soft. Bowel sounds are normal. There is no hepatosplenomegaly. There is no tenderness.   Genitourinary:   Genitourinary Comments: Defer to gynecology   Musculoskeletal: Normal range of motion. She exhibits no edema or deformity.   Lymphadenopathy:     She has no cervical adenopathy.   Neurological: She is alert and oriented to person, place, and time. She has normal strength and normal reflexes. No cranial nerve deficit or sensory deficit. Coordination normal.   Skin: Skin is warm and dry. No rash noted.   Psychiatric: She has a normal mood and affect. Her speech is normal and behavior is normal. Judgment and thought content normal. Cognition and memory are normal.   Nursing note and vitals reviewed.      REVIEWED DATA:    Labs:     Lab Results   Component Value Date     08/25/2017    K 3.8 08/25/2017    AST 13 08/25/2017    ALT 23 08/25/2017    BUN 12 08/25/2017    CREATININE 0.73 08/25/2017    CREATININE 0.68 03/23/2017    CREATININE 0.73 03/03/2016    EGFRIFNONA 81 08/25/2017    EGFRIFAFRI 98 08/25/2017       Lab Results   Component Value Date    HGBA1C 8.10 (H) 08/25/2017    HGBA1C 7.86 (H) 03/23/2017    HGBA1C 8.2 (H) 03/03/2016     (H) 08/25/2017     (H) 03/23/2017     (H) 03/03/2016    MICROALBUR <3.0 08/25/2017       Lab Results   Component Value Date     (H) 08/25/2017     (H) 03/23/2017    LDL 74 03/03/2016    HDL 50 08/25/2017    TRIG 257 (H) 08/25/2017       Lab Results   Component Value Date    TSH 0.600 08/25/2017    FREET4 1.81 (H) 08/25/2017       No results found for: WBC, HGB, PLT    Imaging:         Medical Tests:         Summary of old records / correspondence / consultant report:         Request outside records:         ALLERGIES  No Known  Allergies     MEDICATIONS  Current Outpatient Prescriptions   Medication Sig Dispense Refill   • aspirin 81 MG tablet Take  by mouth daily.     • citalopram (CeleXA) 20 MG tablet TAKE ONE TABLET BY MOUTH ONCE DAILY 90 tablet 0   • Empagliflozin (JARDIANCE) 25 MG tablet Take 1 tablet by mouth daily. 30 tablet 5   • glimepiride (AMARYL) 4 MG tablet TAKE ONE TABLET BY MOUTH EVERY MORNING BEFORE BREAKFAST 30 tablet 5   • glucose blood test strip OneTouch Verio In Vitro Strip; Patient Sig: CHECK BLOOD GLUCOSE 1 TIME A  each 1   • Insulin Pen Needle (BD PEN NEEDLE JUDIE U/F) 32G X 4 MM misc Use as directed to inject insulin once daily. 100 each 1   • JENTADUETO 2.5-1000 MG tablet Take 1 tablet by mouth 2 (two) times a day. 60 tablet 5   • levothyroxine (SYNTHROID, LEVOTHROID) 112 MCG tablet TAKE ONE TABLET BY MOUTH ONCE DAILY 90 tablet 0   • lisinopril-hydrochlorothiazide (PRINZIDE,ZESTORETIC) 20-12.5 MG per tablet Take 1 tablet by mouth Daily. 90 tablet 3   • Omega-3 Fatty Acids (FISH OIL) 1000 MG capsule delayed-release Take  by mouth daily.     • ONETOUCH DELICA LANCETS FINE misc      • rosuvastatin (CRESTOR) 40 MG tablet Take 1 tablet by mouth Daily. 50 tablet 5   • TOUJEO SOLOSTAR 300 UNIT/ML solution pen-injector Inject 10 Units under the skin Daily. 3 pen 3   • vitamin D (ERGOCALCIFEROL) 50562 UNITS capsule capsule Take one capsule by mouth once a week 13 capsule 3     No current facility-administered medications for this visit.        PFSH:     The following portions of the patient's history were reviewed and updated as appropriate: Allergies / Current Medications / Past Medical History / Surgical History / Social History / Family History    PROBLEM LIST   Patient Active Problem List   Diagnosis   • Abnormal blood chemistry level   • Acute cholecystitis   • Benign essential hypertension   • Cervical lymphadenopathy   • Depression   • Controlled type 2 diabetes mellitus without complication   • Dyslipidemia    • Hypothyroidism   • Transient insomnia   • Vitamin D deficiency   • Encounter for immunization   • Routine health maintenance       PAST MEDICAL HISTORY  Past Medical History:   Diagnosis Date   • Hyperlipidemia    • Hypertension    • Hypothyroidism    • Type 2 diabetes mellitus    • Vitamin D deficiency        SURGICAL HISTORY  Past Surgical History:   Procedure Laterality Date   • CHOLECYSTECTOMY     • COLONOSCOPY N/A 9/15/2017    Procedure: COLONOSCOPY;  Surgeon: Brian Puckett MD;  Location: [facility] ENDOSCOPY;  Service:    • EYE SURGERY Left 2015    cataract    • TONSILLECTOMY         SOCIAL HISTORY  Social History     Social History   • Marital status: Single     Spouse name: N/A   • Number of children: 2   • Years of education: N/A     Occupational History   • Part-time      Social History Main Topics   • Smoking status: Never Smoker   • Smokeless tobacco: Never Used   • Alcohol use No   • Drug use: No   • Sexual activity: Defer     Other Topics Concern   • None     Social History Narrative       FAMILY HISTORY  Family History   Problem Relation Age of Onset   • Cancer Mother      LUNG   • Lung cancer Brother      BILE DUCT     • Cancer Maternal Aunt      LUNG         **Dragon Disclaimer:   Much of this encounter note is an electronic transcription/translation of spoken language to printed text. The electronic translation of spoken language may permit erroneous, or at times, nonsensical words or phrases to be inadvertently transcribed. Although I have reviewed the note for such errors, some may still exist.

## 2017-12-15 LAB
BASOPHILS # BLD AUTO: 0.03 10*3/MM3 (ref 0–0.2)
BASOPHILS NFR BLD AUTO: 0.5 % (ref 0–1.5)
EOSINOPHIL # BLD AUTO: 0.17 10*3/MM3 (ref 0–0.7)
EOSINOPHIL NFR BLD AUTO: 2.6 % (ref 0.3–6.2)
ERYTHROCYTE [DISTWIDTH] IN BLOOD BY AUTOMATED COUNT: 13.2 % (ref 11.7–13)
HCT VFR BLD AUTO: 41.7 % (ref 35.6–45.5)
HCV AB S/CO SERPL IA: <0.1 S/CO RATIO (ref 0–0.9)
HGB BLD-MCNC: 14.1 G/DL (ref 11.9–15.5)
IMM GRANULOCYTES # BLD: 0 10*3/MM3 (ref 0–0.03)
IMM GRANULOCYTES NFR BLD: 0 % (ref 0–0.5)
LYMPHOCYTES # BLD AUTO: 1.99 10*3/MM3 (ref 0.9–4.8)
LYMPHOCYTES NFR BLD AUTO: 30.8 % (ref 19.6–45.3)
MCH RBC QN AUTO: 31.2 PG (ref 26.9–32)
MCHC RBC AUTO-ENTMCNC: 33.8 G/DL (ref 32.4–36.3)
MCV RBC AUTO: 92.3 FL (ref 80.5–98.2)
MONOCYTES # BLD AUTO: 0.31 10*3/MM3 (ref 0.2–1.2)
MONOCYTES NFR BLD AUTO: 4.8 % (ref 5–12)
NEUTROPHILS # BLD AUTO: 3.96 10*3/MM3 (ref 1.9–8.1)
NEUTROPHILS NFR BLD AUTO: 61.3 % (ref 42.7–76)
PLATELET # BLD AUTO: 153 10*3/MM3 (ref 140–500)
RBC # BLD AUTO: 4.52 10*6/MM3 (ref 3.9–5.2)
WBC # BLD AUTO: 6.46 10*3/MM3 (ref 4.5–10.7)

## 2018-01-26 ENCOUNTER — LAB (OUTPATIENT)
Dept: ENDOCRINOLOGY | Age: 63
End: 2018-01-26

## 2018-01-26 DIAGNOSIS — E11.9 CONTROLLED TYPE 2 DIABETES MELLITUS WITHOUT COMPLICATION, WITH LONG-TERM CURRENT USE OF INSULIN (HCC): ICD-10-CM

## 2018-01-26 DIAGNOSIS — E03.9 HYPOTHYROIDISM, UNSPECIFIED TYPE: ICD-10-CM

## 2018-01-26 DIAGNOSIS — E55.9 VITAMIN D DEFICIENCY: ICD-10-CM

## 2018-01-26 DIAGNOSIS — Z79.4 CONTROLLED TYPE 2 DIABETES MELLITUS WITHOUT COMPLICATION, WITH LONG-TERM CURRENT USE OF INSULIN (HCC): ICD-10-CM

## 2018-01-29 LAB
25(OH)D3+25(OH)D2 SERPL-MCNC: 53.5 NG/ML (ref 30–100)
ALBUMIN SERPL-MCNC: 4.4 G/DL (ref 3.5–5.2)
ALBUMIN/GLOB SERPL: 2 G/DL
ALP SERPL-CCNC: 70 U/L (ref 39–117)
ALT SERPL-CCNC: 21 U/L (ref 1–33)
AST SERPL-CCNC: 18 U/L (ref 1–32)
BILIRUB SERPL-MCNC: 1 MG/DL (ref 0.1–1.2)
BUN SERPL-MCNC: 9 MG/DL (ref 8–23)
BUN/CREAT SERPL: 11.3 (ref 7–25)
C PEPTIDE SERPL-MCNC: 3.1 NG/ML (ref 1.1–4.4)
CALCIUM SERPL-MCNC: 9.4 MG/DL (ref 8.6–10.5)
CHLORIDE SERPL-SCNC: 103 MMOL/L (ref 98–107)
CHOLEST SERPL-MCNC: 180 MG/DL (ref 0–200)
CO2 SERPL-SCNC: 25.9 MMOL/L (ref 22–29)
CREAT SERPL-MCNC: 0.8 MG/DL (ref 0.57–1)
FT4I SERPL CALC-MCNC: 2.4 (ref 1.2–4.9)
GFR SERPLBLD CREATININE-BSD FMLA CKD-EPI: 72 ML/MIN/1.73
GFR SERPLBLD CREATININE-BSD FMLA CKD-EPI: 88 ML/MIN/1.73
GLOBULIN SER CALC-MCNC: 2.2 GM/DL
GLUCOSE SERPL-MCNC: 183 MG/DL (ref 65–99)
HBA1C MFR BLD: 6.3 % (ref 4.8–5.6)
HDLC SERPL-MCNC: 54 MG/DL (ref 40–60)
INTERPRETATION: NORMAL
LDLC SERPL CALC-MCNC: 110 MG/DL (ref 0–100)
Lab: NORMAL
POTASSIUM SERPL-SCNC: 4.2 MMOL/L (ref 3.5–5.2)
PROT SERPL-MCNC: 6.6 G/DL (ref 6–8.5)
SODIUM SERPL-SCNC: 141 MMOL/L (ref 136–145)
T3FREE SERPL-MCNC: 2.7 PG/ML (ref 2–4.4)
T3RU NFR SERPL: 29 % (ref 24–39)
T4 FREE SERPL-MCNC: 1.49 NG/DL (ref 0.93–1.7)
T4 SERPL-MCNC: 8.3 UG/DL (ref 4.5–12)
THYROGLOB AB SERPL-ACNC: <1 IU/ML
THYROGLOB SERPL-MCNC: 4.3 NG/ML
THYROGLOB SERPL-MCNC: NORMAL NG/ML
TRIGL SERPL-MCNC: 81 MG/DL (ref 0–150)
TSH SERPL DL<=0.005 MIU/L-ACNC: 0.93 UIU/ML (ref 0.45–4.5)
VLDLC SERPL CALC-MCNC: 16.2 MG/DL (ref 5–40)

## 2018-02-22 ENCOUNTER — OFFICE VISIT (OUTPATIENT)
Dept: ENDOCRINOLOGY | Age: 63
End: 2018-02-22

## 2018-02-22 VITALS
DIASTOLIC BLOOD PRESSURE: 80 MMHG | WEIGHT: 150 LBS | HEIGHT: 67 IN | SYSTOLIC BLOOD PRESSURE: 140 MMHG | BODY MASS INDEX: 23.54 KG/M2

## 2018-02-22 DIAGNOSIS — E78.5 DYSLIPIDEMIA: ICD-10-CM

## 2018-02-22 DIAGNOSIS — E03.9 HYPOTHYROIDISM, UNSPECIFIED TYPE: ICD-10-CM

## 2018-02-22 DIAGNOSIS — E11.9 CONTROLLED TYPE 2 DIABETES MELLITUS WITHOUT COMPLICATION, WITH LONG-TERM CURRENT USE OF INSULIN (HCC): Primary | ICD-10-CM

## 2018-02-22 DIAGNOSIS — I10 BENIGN ESSENTIAL HYPERTENSION: ICD-10-CM

## 2018-02-22 DIAGNOSIS — E55.9 VITAMIN D DEFICIENCY: ICD-10-CM

## 2018-02-22 DIAGNOSIS — Z79.4 CONTROLLED TYPE 2 DIABETES MELLITUS WITHOUT COMPLICATION, WITH LONG-TERM CURRENT USE OF INSULIN (HCC): Primary | ICD-10-CM

## 2018-02-22 PROCEDURE — 99214 OFFICE O/P EST MOD 30 MIN: CPT | Performed by: NURSE PRACTITIONER

## 2018-02-22 NOTE — PROGRESS NOTES
"Subjective   Magui Dumont is a 62 y.o. female is here today for follow-up.  Chief Complaint   Patient presents with   • Diabetes     recent labs, pt test 1x daily, pt did not bring meter   • Hypothyroidism   • Hypertension   • Hyperlipidemia   • Vitamin D Deficiency     /80  Ht 170.2 cm (67\")  Wt 68 kg (150 lb)  BMI 23.49 kg/m2  Current Outpatient Prescriptions on File Prior to Visit   Medication Sig   • aspirin 81 MG tablet Take  by mouth daily.   • citalopram (CeleXA) 20 MG tablet TAKE ONE TABLET BY MOUTH ONCE DAILY   • glimepiride (AMARYL) 4 MG tablet TAKE ONE TABLET BY MOUTH EVERY MORNING BEFORE BREAKFAST   • glucose blood test strip OneTouch Verio In Vitro Strip; Patient Sig: CHECK BLOOD GLUCOSE 1 TIME A DAY   • Insulin Pen Needle (BD PEN NEEDLE JUDIE U/F) 32G X 4 MM misc Use as directed to inject insulin once daily.   • levothyroxine (SYNTHROID, LEVOTHROID) 112 MCG tablet TAKE ONE TABLET BY MOUTH ONCE DAILY   • lisinopril-hydrochlorothiazide (PRINZIDE,ZESTORETIC) 20-12.5 MG per tablet Take 1 tablet by mouth Daily.   • Omega-3 Fatty Acids (FISH OIL) 1000 MG capsule delayed-release Take  by mouth daily.   • ONETOUCH DELICA LANCETS FINE misc    • rosuvastatin (CRESTOR) 40 MG tablet Take 1 tablet by mouth Daily.   • TOUJEO SOLOSTAR 300 UNIT/ML solution pen-injector Inject 10 Units under the skin Daily.   • vitamin D (ERGOCALCIFEROL) 97746 UNITS capsule capsule Take one capsule by mouth once a week   • [DISCONTINUED] Empagliflozin (JARDIANCE) 25 MG tablet Take 1 tablet by mouth daily.   • [DISCONTINUED] JENTADUETO 2.5-1000 MG tablet Take 1 tablet by mouth 2 (two) times a day.     No current facility-administered medications on file prior to visit.      Family History   Problem Relation Age of Onset   • Cancer Mother      LUNG   • Lung cancer Brother      BILE DUCT     • Cancer Maternal Aunt      LUNG     Social History   Substance Use Topics   • Smoking status: Never Smoker   • Smokeless " tobacco: Never Used   • Alcohol use No     No Known Allergies      History of Present Illness  Encounter Diagnoses   Name Primary?   • Controlled type 2 diabetes mellitus without complication, with long-term current use of insulin Yes   • Hypothyroidism, unspecified type    • Dyslipidemia    • Benign essential hypertension    • Vitamin D deficiency    This is a 62-year-old female patient here today for routine follow-up visit.  She has had recent labs done which were reviewed and she was provided a copy.  She is slightly hypertensive at today's visit but states she had some car problems prior to coming into the office and she feels that this is the reason.  She was started on insulin at her last visit and she wants to be reassured this is a good thing for her.  She states reactions from her Spirit[employer] members have been that she is doomed since starting on insulin.    The following portions of the patient's history were reviewed and updated as appropriate: allergies, current medications, past family history, past medical history, past social history, past surgical history and problem list.    Review of Systems   Constitutional: Negative for fatigue.   HENT: Negative for trouble swallowing.    Eyes: Negative for visual disturbance.   Respiratory: Negative for shortness of breath.    Cardiovascular: Negative for leg swelling.   Endocrine: Negative for polyuria.   Skin: Negative for wound.   Neurological: Negative for numbness.       Objective   Physical Exam   Constitutional: She is oriented to person, place, and time. She appears well-developed and well-nourished. No distress.   HENT:   Head: Normocephalic and atraumatic.   Right Ear: External ear normal.   Left Ear: External ear normal.   Nose: Nose normal.   Mouth/Throat: Oropharynx is clear and moist. No oropharyngeal exudate.   Eyes: EOM are normal. Pupils are equal, round, and reactive to light. Right eye exhibits no discharge. Left eye exhibits no discharge.   Neck:  Trachea normal, normal range of motion and full passive range of motion without pain. Neck supple. No tracheal tenderness present. Carotid bruit is not present. No tracheal deviation, no edema and no erythema present. No thyroid mass and no thyromegaly present.   Cardiovascular: Normal rate, regular rhythm, normal heart sounds and intact distal pulses.  Exam reveals no gallop and no friction rub.    No murmur heard.  Pulmonary/Chest: Effort normal and breath sounds normal. No stridor. No respiratory distress. She has no wheezes. She has no rales.   Abdominal: Soft. Bowel sounds are normal. She exhibits no distension.   Musculoskeletal: Normal range of motion. She exhibits no edema or deformity.   Lymphadenopathy:     She has no cervical adenopathy.   Neurological: She is alert and oriented to person, place, and time.   Skin: Skin is warm and dry. No rash noted. She is not diaphoretic. No erythema. No pallor.   Psychiatric: She has a normal mood and affect. Her behavior is normal. Judgment and thought content normal.   Nursing note and vitals reviewed.    Results for orders placed or performed in visit on 12/14/17   Hepatitis C Antibody   Result Value Ref Range    Hep C Virus Ab <0.1 0.0 - 0.9 s/co ratio   CBC & Differential   Result Value Ref Range    WBC 6.46 4.50 - 10.70 10*3/mm3    RBC 4.52 3.90 - 5.20 10*6/mm3    Hemoglobin 14.1 11.9 - 15.5 g/dL    Hematocrit 41.7 35.6 - 45.5 %    MCV 92.3 80.5 - 98.2 fL    MCH 31.2 26.9 - 32.0 pg    MCHC 33.8 32.4 - 36.3 g/dL    RDW 13.2 (H) 11.7 - 13.0 %    Platelets 153 140 - 500 10*3/mm3    Neutrophil Rel % 61.3 42.7 - 76.0 %    Lymphocyte Rel % 30.8 19.6 - 45.3 %    Monocyte Rel % 4.8 (L) 5.0 - 12.0 %    Eosinophil Rel % 2.6 0.3 - 6.2 %    Basophil Rel % 0.5 0.0 - 1.5 %    Neutrophils Absolute 3.96 1.90 - 8.10 10*3/mm3    Lymphocytes Absolute 1.99 0.90 - 4.80 10*3/mm3    Monocytes Absolute 0.31 0.20 - 1.20 10*3/mm3    Eosinophils Absolute 0.17 0.00 - 0.70 10*3/mm3     Basophils Absolute 0.03 0.00 - 0.20 10*3/mm3    Immature Granulocyte Rel % 0.0 0.0 - 0.5 %    Immature Grans Absolute 0.00 0.00 - 0.03 10*3/mm3     Lab Results   Component Value Date    BUN 9 01/26/2018    CREATININE 0.80 01/26/2018    EGFRIFNONA 72 01/26/2018    EGFRIFAFRI 88 01/26/2018    BCR 11.3 01/26/2018    K 4.2 01/26/2018    CO2 25.9 01/26/2018    CALCIUM 9.4 01/26/2018    PROTENTOTREF 6.6 01/26/2018    ALBUMIN 4.40 01/26/2018    LABIL2 2.0 01/26/2018    AST 18 01/26/2018    ALT 21 01/26/2018     Lab Results   Component Value Date    HGBA1C 6.30 (H) 01/26/2018         Assessment/Plan   Problems Addressed this Visit        Cardiovascular and Mediastinum    Benign essential hypertension       Digestive    Vitamin D deficiency       Endocrine    Controlled type 2 diabetes mellitus without complication - Primary    Hypothyroidism       Other    Dyslipidemia          In summary, patient was seen and examined.  She will continue all her current medications as prescribed.  She did not bring her blood glucose meter in today but states her blood sugars are typically 1:30 to 160 range in the morning.  I did advise her that she could increase her toujeo to 12 units and to target morning blood sugars to less than 110 mg/dL.  She was assured that insulin is very safe for her to take it is actually helping to preserve her beta social function.  She will follow-up with Dr. Mcfarland her next visit with labs.  Metabolically she has improved significantly with the addition of insulin.  Her hemoglobin A1c reflects that her diabetes is under good control at this time.  She will continue to check her blood sugars one time daily and I've asked that she contact the office should she have any questions or concerns prior to then.

## 2018-03-01 DIAGNOSIS — E03.9 HYPOTHYROIDISM: ICD-10-CM

## 2018-03-01 DIAGNOSIS — E55.9 VITAMIN D DEFICIENCY: ICD-10-CM

## 2018-03-01 DIAGNOSIS — I10 BENIGN ESSENTIAL HYPERTENSION: ICD-10-CM

## 2018-03-01 DIAGNOSIS — E78.5 DYSLIPIDEMIA: ICD-10-CM

## 2018-03-01 DIAGNOSIS — E11.9 CONTROLLED TYPE 2 DIABETES MELLITUS WITHOUT COMPLICATION (HCC): ICD-10-CM

## 2018-03-06 RX ORDER — LEVOTHYROXINE SODIUM 112 UG/1
TABLET ORAL
Qty: 90 TABLET | Refills: 1 | Status: SHIPPED | OUTPATIENT
Start: 2018-03-06 | End: 2018-08-07 | Stop reason: SDUPTHER

## 2018-03-28 ENCOUNTER — TELEPHONE (OUTPATIENT)
Dept: ENDOCRINOLOGY | Age: 63
End: 2018-03-28

## 2018-03-28 NOTE — TELEPHONE ENCOUNTER
----- Message from Briseida Jarvis sent at 3/28/2018 11:04 AM EDT -----  Contact: PATIENT      PATIENT/PHAMRCY REQUEST TO REFIL MEDICATION:  MEDICATION:TOUJEO SOLOSTAR 300 UNIT/ML solution pen-injector  MESSAGE:  PATIENT  HAS CALLED IN TODAY IN REGARDS  THE BAOVE MEDICATION IS TO EXPENSIVE AND NOT COVERED BY INSURANCE. THEY ARE ASKING FOR A MORE CHEAP OR GENERIC BRAND BE SENT In.    PHARMACY:Pharmacy:  [address] - [address] [place] [address] - [phone]  - [phone] FX Phone:  [phone] Fax:  [phone]   Address:  [address] [place] [address]     Pharmacy Comments:  --              PHONE NUMBER:  [phone]      Message sent to Felicity, will call patient when Felicity advises what to do.

## 2018-04-04 ENCOUNTER — TELEPHONE (OUTPATIENT)
Dept: ENDOCRINOLOGY | Age: 63
End: 2018-04-04

## 2018-04-11 DIAGNOSIS — E55.9 VITAMIN D DEFICIENCY: ICD-10-CM

## 2018-04-11 DIAGNOSIS — I10 BENIGN ESSENTIAL HYPERTENSION: ICD-10-CM

## 2018-04-11 DIAGNOSIS — E03.9 HYPOTHYROIDISM, UNSPECIFIED TYPE: ICD-10-CM

## 2018-04-11 DIAGNOSIS — E11.9 CONTROLLED TYPE 2 DIABETES MELLITUS WITHOUT COMPLICATION, UNSPECIFIED LONG TERM INSULIN USE STATUS: ICD-10-CM

## 2018-04-11 DIAGNOSIS — E78.5 DYSLIPIDEMIA: ICD-10-CM

## 2018-04-11 RX ORDER — INSULIN GLARGINE 300 U/ML
10 INJECTION, SOLUTION SUBCUTANEOUS DAILY
Qty: 3 PEN | Refills: 1 | Status: SHIPPED | OUTPATIENT
Start: 2018-04-11 | End: 2018-12-14 | Stop reason: SDUPTHER

## 2018-05-17 DIAGNOSIS — E55.9 VITAMIN D DEFICIENCY: Primary | ICD-10-CM

## 2018-05-17 DIAGNOSIS — Z79.4 CONTROLLED TYPE 2 DIABETES MELLITUS WITHOUT COMPLICATION, WITH LONG-TERM CURRENT USE OF INSULIN (HCC): ICD-10-CM

## 2018-05-17 DIAGNOSIS — R79.9 ABNORMAL BLOOD CHEMISTRY LEVEL: ICD-10-CM

## 2018-05-17 DIAGNOSIS — E11.9 CONTROLLED TYPE 2 DIABETES MELLITUS WITHOUT COMPLICATION, WITH LONG-TERM CURRENT USE OF INSULIN (HCC): ICD-10-CM

## 2018-05-17 DIAGNOSIS — E78.5 DYSLIPIDEMIA: ICD-10-CM

## 2018-05-17 DIAGNOSIS — E03.9 HYPOTHYROIDISM, UNSPECIFIED TYPE: ICD-10-CM

## 2018-05-29 RX ORDER — PEN NEEDLE, DIABETIC 32GX 5/32"
NEEDLE, DISPOSABLE MISCELLANEOUS
Qty: 100 EACH | Refills: 0 | Status: SHIPPED | OUTPATIENT
Start: 2018-05-29 | End: 2018-07-31 | Stop reason: SDUPTHER

## 2018-06-01 ENCOUNTER — LAB (OUTPATIENT)
Dept: ENDOCRINOLOGY | Age: 63
End: 2018-06-01

## 2018-06-01 DIAGNOSIS — E03.9 HYPOTHYROIDISM, UNSPECIFIED TYPE: ICD-10-CM

## 2018-06-01 DIAGNOSIS — E78.5 DYSLIPIDEMIA: ICD-10-CM

## 2018-06-01 DIAGNOSIS — E11.9 CONTROLLED TYPE 2 DIABETES MELLITUS WITHOUT COMPLICATION, WITH LONG-TERM CURRENT USE OF INSULIN (HCC): ICD-10-CM

## 2018-06-01 DIAGNOSIS — Z79.4 CONTROLLED TYPE 2 DIABETES MELLITUS WITHOUT COMPLICATION, WITH LONG-TERM CURRENT USE OF INSULIN (HCC): ICD-10-CM

## 2018-06-01 DIAGNOSIS — R79.9 ABNORMAL BLOOD CHEMISTRY LEVEL: ICD-10-CM

## 2018-06-01 DIAGNOSIS — E55.9 VITAMIN D DEFICIENCY: ICD-10-CM

## 2018-06-05 LAB
25(OH)D3+25(OH)D2 SERPL-MCNC: 32.5 NG/ML (ref 30–100)
ALBUMIN SERPL-MCNC: 4.6 G/DL (ref 3.5–5.2)
ALBUMIN/GLOB SERPL: 2.4 G/DL
ALP SERPL-CCNC: 64 U/L (ref 39–117)
ALT SERPL-CCNC: 20 U/L (ref 1–33)
AST SERPL-CCNC: 22 U/L (ref 1–32)
BILIRUB SERPL-MCNC: 1.2 MG/DL (ref 0.1–1.2)
BUN SERPL-MCNC: 14 MG/DL (ref 8–23)
BUN/CREAT SERPL: 17.5 (ref 7–25)
C PEPTIDE SERPL-MCNC: 3.2 NG/ML (ref 1.1–4.4)
CALCIUM SERPL-MCNC: 9.3 MG/DL (ref 8.6–10.5)
CHLORIDE SERPL-SCNC: 98 MMOL/L (ref 98–107)
CHOLEST SERPL-MCNC: 237 MG/DL (ref 0–200)
CO2 SERPL-SCNC: 25.7 MMOL/L (ref 22–29)
CREAT SERPL-MCNC: 0.8 MG/DL (ref 0.57–1)
GFR SERPLBLD CREATININE-BSD FMLA CKD-EPI: 72 ML/MIN/1.73
GFR SERPLBLD CREATININE-BSD FMLA CKD-EPI: 88 ML/MIN/1.73
GLOBULIN SER CALC-MCNC: 1.9 GM/DL
GLUCOSE SERPL-MCNC: 180 MG/DL (ref 65–99)
HBA1C MFR BLD: 6.33 % (ref 4.8–5.6)
HDLC SERPL-MCNC: 52 MG/DL (ref 40–60)
INTERPRETATION: NORMAL
LDLC SERPL CALC-MCNC: 164 MG/DL (ref 0–100)
Lab: NORMAL
MICROALBUMIN UR-MCNC: <3 UG/ML
POTASSIUM SERPL-SCNC: 4.2 MMOL/L (ref 3.5–5.2)
PROT SERPL-MCNC: 6.5 G/DL (ref 6–8.5)
SODIUM SERPL-SCNC: 138 MMOL/L (ref 136–145)
T3FREE SERPL-MCNC: 2.6 PG/ML (ref 2–4.4)
T4 FREE SERPL-MCNC: 1.11 NG/DL (ref 0.93–1.7)
T4 SERPL-MCNC: 6.48 MCG/DL (ref 4.5–11.7)
THYROGLOB AB SERPL-ACNC: <1 IU/ML
THYROGLOB SERPL-MCNC: 8.5 NG/ML
THYROGLOB SERPL-MCNC: NORMAL NG/ML
TRIGL SERPL-MCNC: 105 MG/DL (ref 0–150)
TSH SERPL DL<=0.005 MIU/L-ACNC: 3.86 MIU/ML (ref 0.27–4.2)
URATE SERPL-MCNC: 3.4 MG/DL (ref 2.4–5.7)
VLDLC SERPL CALC-MCNC: 21 MG/DL (ref 5–40)

## 2018-06-22 ENCOUNTER — OFFICE VISIT (OUTPATIENT)
Dept: ENDOCRINOLOGY | Age: 63
End: 2018-06-22

## 2018-06-22 VITALS
SYSTOLIC BLOOD PRESSURE: 120 MMHG | WEIGHT: 152.2 LBS | DIASTOLIC BLOOD PRESSURE: 80 MMHG | RESPIRATION RATE: 16 BRPM | BODY MASS INDEX: 23.84 KG/M2

## 2018-06-22 DIAGNOSIS — E06.3 HYPOTHYROIDISM DUE TO HASHIMOTO'S THYROIDITIS: ICD-10-CM

## 2018-06-22 DIAGNOSIS — I10 BENIGN ESSENTIAL HYPERTENSION: ICD-10-CM

## 2018-06-22 DIAGNOSIS — Z79.4 CONTROLLED TYPE 2 DIABETES MELLITUS WITHOUT COMPLICATION, WITH LONG-TERM CURRENT USE OF INSULIN (HCC): Primary | ICD-10-CM

## 2018-06-22 DIAGNOSIS — E11.9 CONTROLLED TYPE 2 DIABETES MELLITUS WITHOUT COMPLICATION, WITH LONG-TERM CURRENT USE OF INSULIN (HCC): Primary | ICD-10-CM

## 2018-06-22 DIAGNOSIS — E03.8 HYPOTHYROIDISM DUE TO HASHIMOTO'S THYROIDITIS: ICD-10-CM

## 2018-06-22 DIAGNOSIS — E55.9 VITAMIN D DEFICIENCY: ICD-10-CM

## 2018-06-22 PROCEDURE — 99214 OFFICE O/P EST MOD 30 MIN: CPT | Performed by: INTERNAL MEDICINE

## 2018-06-22 RX ORDER — EMPAGLIFLOZIN 25 MG/1
1 TABLET, FILM COATED ORAL DAILY
Qty: 30 TABLET | Refills: 5 | Status: SHIPPED | OUTPATIENT
Start: 2018-06-22 | End: 2020-02-24 | Stop reason: SINTOL

## 2018-06-22 NOTE — PROGRESS NOTES
Subjective   Magui Dumont is a 62 y.o. female seen for follow up for DM2, hyperlipidemia, hypothyroidism, HTN, vit d deficiency, lab review. Patient denies any problems or concerns. She is checking BG once a day fasting in the AM.   History of Present Illness this is a 62-year-old female known patient with type II diabetes hypertension and dyslipidemia as well as vitamin D deficiency and hypothyroidism.  Over the last 6 months she has had no significant health problems for which to go to the emergency room or hospital.    /80   Resp 16   Wt 69 kg (152 lb 3.2 oz)   BMI 23.84 kg/m²      No Known Allergies    Current Outpatient Prescriptions:   •  aspirin 81 MG tablet, Take  by mouth daily., Disp: , Rfl:   •  BD PEN NEEDLE JUDIE U/F 32G X 4 MM misc, USE AS DIRECTED TO INJECT INSULIN ONCE DAILY, Disp: 100 each, Rfl: 0  •  citalopram (CeleXA) 20 MG tablet, TAKE ONE TABLET BY MOUTH ONCE DAILY, Disp: 90 tablet, Rfl: 0  •  glimepiride (AMARYL) 4 MG tablet, TAKE ONE TABLET BY MOUTH EVERY MORNING BEFORE BREAKFAST, Disp: 30 tablet, Rfl: 5  •  glucose blood test strip, OneTouch Verio In Vitro Strip; Patient Sig: CHECK BLOOD GLUCOSE 1 TIME A DAY, Disp: 100 each, Rfl: 1  •  levothyroxine (SYNTHROID, LEVOTHROID) 112 MCG tablet, TAKE ONE TABLET BY MOUTH ONCE DAILY, Disp: 90 tablet, Rfl: 1  •  Linagliptin-Metformin HCl (JENTADUETO) 2.5-850 MG tablet, Take  by mouth., Disp: , Rfl:   •  lisinopril-hydrochlorothiazide (PRINZIDE,ZESTORETIC) 20-12.5 MG per tablet, Take 1 tablet by mouth Daily., Disp: 90 tablet, Rfl: 3  •  Omega-3 Fatty Acids (FISH OIL) 1000 MG capsule delayed-release, Take  by mouth daily., Disp: , Rfl:   •  ONETOUCH DELICA LANCETS FINE misc, , Disp: , Rfl:   •  rosuvastatin (CRESTOR) 40 MG tablet, Take 1 tablet by mouth Daily., Disp: 50 tablet, Rfl: 5  •  TOUJEO SOLOSTAR 300 UNIT/ML solution pen-injector, Inject 10 Units under the skin Daily., Disp: 3 pen, Rfl: 1  •  vitamin D (ERGOCALCIFEROL) 06548 UNITS  capsule capsule, Take one capsule by mouth once a week, Disp: 13 capsule, Rfl: 3      The following portions of the patient's history were reviewed and updated as appropriate: allergies, current medications, past family history, past medical history, past social history, past surgical history and problem list.    Review of Systems   Constitutional: Negative.    HENT: Negative.    Eyes: Negative.    Respiratory: Negative.    Cardiovascular: Negative.    Gastrointestinal: Negative.    Endocrine: Negative.    Genitourinary: Negative.    Musculoskeletal: Negative.    Skin: Negative.    Allergic/Immunologic: Negative.    Neurological: Negative.    Hematological: Negative.    Psychiatric/Behavioral: Negative.        Objective   Physical Exam   Constitutional: She is oriented to person, place, and time. She appears well-developed and well-nourished. No distress.   HENT:   Head: Normocephalic and atraumatic.   Right Ear: External ear normal.   Left Ear: External ear normal.   Nose: Nose normal.   Mouth/Throat: Oropharynx is clear and moist. No oropharyngeal exudate.   Eyes: EOM are normal. Pupils are equal, round, and reactive to light. Right eye exhibits no discharge. Left eye exhibits no discharge. No scleral icterus.   Neck: Trachea normal, normal range of motion and full passive range of motion without pain. Neck supple. No JVD present. No tracheal tenderness present. Carotid bruit is not present. No tracheal deviation, no edema and no erythema present. No thyroid mass and no thyromegaly present.   Cardiovascular: Normal rate, regular rhythm, normal heart sounds and intact distal pulses.  Exam reveals no gallop and no friction rub.    No murmur heard.  Pulmonary/Chest: Effort normal and breath sounds normal. No stridor. No respiratory distress. She has no wheezes. She has no rales. She exhibits no tenderness.   Abdominal: Soft. Bowel sounds are normal. She exhibits no distension and no mass. There is no tenderness.  There is no rebound and no guarding. No hernia.   Musculoskeletal: Normal range of motion. She exhibits no edema, tenderness or deformity.   Lymphadenopathy:     She has no cervical adenopathy.   Neurological: She is alert and oriented to person, place, and time. She displays normal reflexes. No cranial nerve deficit or sensory deficit. She exhibits normal muscle tone. Coordination normal.   Skin: Skin is warm and dry. No rash noted. She is not diaphoretic. No erythema. No pallor.   Psychiatric: She has a normal mood and affect. Her behavior is normal. Judgment and thought content normal.   Nursing note and vitals reviewed.    Results for orders placed or performed in visit on 06/01/18   Comprehensive Metabolic Panel   Result Value Ref Range    Glucose 180 (H) 65 - 99 mg/dL    BUN 14 8 - 23 mg/dL    Creatinine 0.80 0.57 - 1.00 mg/dL    eGFR Non African Am 72 >60 mL/min/1.73    eGFR African Am 88 >60 mL/min/1.73    BUN/Creatinine Ratio 17.5 7.0 - 25.0    Sodium 138 136 - 145 mmol/L    Potassium 4.2 3.5 - 5.2 mmol/L    Chloride 98 98 - 107 mmol/L    Total CO2 25.7 22.0 - 29.0 mmol/L    Calcium 9.3 8.6 - 10.5 mg/dL    Total Protein 6.5 6.0 - 8.5 g/dL    Albumin 4.60 3.50 - 5.20 g/dL    Globulin 1.9 gm/dL    A/G Ratio 2.4 g/dL    Total Bilirubin 1.2 0.1 - 1.2 mg/dL    Alkaline Phosphatase 64 39 - 117 U/L    AST (SGOT) 22 1 - 32 U/L    ALT (SGPT) 20 1 - 33 U/L   C-Peptide   Result Value Ref Range    C-Peptide 3.2 1.1 - 4.4 ng/mL   Hemoglobin A1c   Result Value Ref Range    Hemoglobin A1C 6.33 (H) 4.80 - 5.60 %   Lipid Panel   Result Value Ref Range    Total Cholesterol 237 (H) 0 - 200 mg/dL    Triglycerides 105 0 - 150 mg/dL    HDL Cholesterol 52 40 - 60 mg/dL    VLDL Cholesterol 21 5 - 40 mg/dL    LDL Cholesterol  164 (H) 0 - 100 mg/dL   Uric Acid   Result Value Ref Range    Uric Acid 3.4 2.4 - 5.7 mg/dL   Vitamin D 25 Hydroxy   Result Value Ref Range    25 Hydroxy, Vitamin D 32.5 30.0 - 100.0 ng/ml   T4, Free   Result  Value Ref Range    Free T4 1.11 0.93 - 1.70 ng/dL   T4 & TSH ([facility])   Result Value Ref Range    TSH 3.860 0.270 - 4.200 mIU/mL    T4, Total 6.48 4.50 - 11.70 mcg/dL   T3, Free   Result Value Ref Range    T3, Free 2.6 2.0 - 4.4 pg/mL   Comprehensive Thyroglobulin   Result Value Ref Range    Thyroglobulin Ab <1.0 IU/mL    Thyroglobulin 8.5 ng/mL    Thyroglobulin (TG-ANN) Comment ng/mL   MicroAlbumin, Urine, Random   Result Value Ref Range    Microalbumin, Urine <3.0 Not Estab. ug/mL   Cardiovascular Risk Assessment   Result Value Ref Range    Interpretation Note    Diabetes Patient Education   Result Value Ref Range    PDF Image Not applicable          Assessment/Plan   Diagnoses and all orders for this visit:    Controlled type 2 diabetes mellitus without complication, with long-term current use of insulin  -     T4 & TSH ([facility]); Future  -     T3, Free; Future  -     T4, Free; Future  -     Uric Acid; Future  -     Vitamin D 25 Hydroxy; Future  -     Comprehensive Metabolic Panel; Future  -     C-Peptide; Future  -     Hemoglobin A1c; Future  -     Lipid Panel; Future  -     MicroAlbumin, Urine, Random - Urine, Clean Catch; Future    Benign essential hypertension  -     T4 & TSH ([facility]); Future  -     T3, Free; Future  -     T4, Free; Future  -     Uric Acid; Future  -     Vitamin D 25 Hydroxy; Future  -     Comprehensive Metabolic Panel; Future  -     C-Peptide; Future  -     Hemoglobin A1c; Future  -     Lipid Panel; Future  -     MicroAlbumin, Urine, Random - Urine, Clean Catch; Future    Vitamin D deficiency  -     T4 & TSH ([facility]); Future  -     T3, Free; Future  -     T4, Free; Future  -     Uric Acid; Future  -     Vitamin D 25 Hydroxy; Future  -     Comprehensive Metabolic Panel; Future  -     C-Peptide; Future  -     Hemoglobin A1c; Future  -     Lipid Panel; Future  -     MicroAlbumin, Urine, Random - Urine, Clean Catch; Future    Hypothyroidism due to Hashimoto's thyroiditis  -     T4 & TSH  ([facility]); Future  -     T3, Free; Future  -     T4, Free; Future  -     Uric Acid; Future  -     Vitamin D 25 Hydroxy; Future  -     Comprehensive Metabolic Panel; Future  -     C-Peptide; Future  -     Hemoglobin A1c; Future  -     Lipid Panel; Future  -     MicroAlbumin, Urine, Random - Urine, Clean Catch; Future    Other orders  -     JARDIANCE 25 MG tablet; Take 1 tablet by mouth Daily.             in summary I saw and examined this 61-year-old female for above-mentioned problems.  I reviewed her laboratory evaluation of 06/01/2018 and provided her with a hard copy of it.  Overall she is clinically and metabolically stable and therefore we will go ahead and continue all her current prescriptions.  She will see Ms. Felicity Patel in 6 months or sooner if needed with laboratory evaluation prior to each office visit.

## 2018-06-30 DIAGNOSIS — E03.9 HYPOTHYROIDISM, UNSPECIFIED TYPE: ICD-10-CM

## 2018-06-30 DIAGNOSIS — I10 BENIGN ESSENTIAL HYPERTENSION: ICD-10-CM

## 2018-06-30 DIAGNOSIS — E78.5 DYSLIPIDEMIA: ICD-10-CM

## 2018-06-30 DIAGNOSIS — E55.9 VITAMIN D DEFICIENCY: ICD-10-CM

## 2018-06-30 DIAGNOSIS — E11.9 CONTROLLED TYPE 2 DIABETES MELLITUS WITHOUT COMPLICATION, UNSPECIFIED LONG TERM INSULIN USE STATUS: ICD-10-CM

## 2018-07-02 RX ORDER — CITALOPRAM 20 MG/1
TABLET ORAL
Qty: 90 TABLET | Refills: 0 | Status: SHIPPED | OUTPATIENT
Start: 2018-07-02 | End: 2018-11-01 | Stop reason: SDUPTHER

## 2018-07-02 RX ORDER — ERGOCALCIFEROL 1.25 MG/1
CAPSULE ORAL
Qty: 4 CAPSULE | Refills: 12 | Status: SHIPPED | OUTPATIENT
Start: 2018-07-02 | End: 2019-06-19 | Stop reason: SDUPTHER

## 2018-07-23 RX ORDER — GLIMEPIRIDE 4 MG/1
TABLET ORAL
Qty: 30 TABLET | Refills: 5 | Status: SHIPPED | OUTPATIENT
Start: 2018-07-23 | End: 2019-04-25 | Stop reason: SDUPTHER

## 2018-08-07 DIAGNOSIS — I10 BENIGN ESSENTIAL HYPERTENSION: ICD-10-CM

## 2018-08-07 DIAGNOSIS — E03.9 HYPOTHYROIDISM, UNSPECIFIED TYPE: ICD-10-CM

## 2018-08-07 DIAGNOSIS — E11.9 CONTROLLED TYPE 2 DIABETES MELLITUS WITHOUT COMPLICATION, UNSPECIFIED LONG TERM INSULIN USE STATUS: ICD-10-CM

## 2018-08-07 DIAGNOSIS — E55.9 VITAMIN D DEFICIENCY: ICD-10-CM

## 2018-08-07 DIAGNOSIS — E78.5 DYSLIPIDEMIA: ICD-10-CM

## 2018-08-07 RX ORDER — LEVOTHYROXINE SODIUM 112 UG/1
112 TABLET ORAL DAILY
Qty: 90 TABLET | Refills: 1 | Status: SHIPPED | OUTPATIENT
Start: 2018-08-07 | End: 2019-03-13 | Stop reason: SDUPTHER

## 2018-11-01 RX ORDER — CITALOPRAM 20 MG/1
TABLET ORAL
Qty: 90 TABLET | Refills: 0 | Status: SHIPPED | OUTPATIENT
Start: 2018-11-01 | End: 2019-06-25 | Stop reason: SDUPTHER

## 2018-12-06 ENCOUNTER — APPOINTMENT (OUTPATIENT)
Dept: WOMENS IMAGING | Facility: HOSPITAL | Age: 63
End: 2018-12-06

## 2018-12-06 DIAGNOSIS — E78.5 DYSLIPIDEMIA: ICD-10-CM

## 2018-12-06 DIAGNOSIS — I10 BENIGN ESSENTIAL HYPERTENSION: ICD-10-CM

## 2018-12-06 DIAGNOSIS — E55.9 VITAMIN D DEFICIENCY: ICD-10-CM

## 2018-12-06 DIAGNOSIS — E11.9 CONTROLLED TYPE 2 DIABETES MELLITUS WITHOUT COMPLICATION (HCC): ICD-10-CM

## 2018-12-06 PROCEDURE — 77067 SCR MAMMO BI INCL CAD: CPT | Performed by: RADIOLOGY

## 2018-12-06 PROCEDURE — 77063 BREAST TOMOSYNTHESIS BI: CPT | Performed by: RADIOLOGY

## 2018-12-06 RX ORDER — LISINOPRIL AND HYDROCHLOROTHIAZIDE 20; 12.5 MG/1; MG/1
1 TABLET ORAL DAILY
Qty: 90 TABLET | Refills: 0 | Status: SHIPPED | OUTPATIENT
Start: 2018-12-06 | End: 2019-05-10 | Stop reason: SDUPTHER

## 2018-12-07 ENCOUNTER — RESULTS ENCOUNTER (OUTPATIENT)
Dept: ENDOCRINOLOGY | Age: 63
End: 2018-12-07

## 2018-12-07 DIAGNOSIS — I10 BENIGN ESSENTIAL HYPERTENSION: ICD-10-CM

## 2018-12-07 DIAGNOSIS — E06.3 HYPOTHYROIDISM DUE TO HASHIMOTO'S THYROIDITIS: ICD-10-CM

## 2018-12-07 DIAGNOSIS — E55.9 VITAMIN D DEFICIENCY: ICD-10-CM

## 2018-12-07 DIAGNOSIS — Z79.4 CONTROLLED TYPE 2 DIABETES MELLITUS WITHOUT COMPLICATION, WITH LONG-TERM CURRENT USE OF INSULIN (HCC): ICD-10-CM

## 2018-12-07 DIAGNOSIS — E03.8 HYPOTHYROIDISM DUE TO HASHIMOTO'S THYROIDITIS: ICD-10-CM

## 2018-12-07 DIAGNOSIS — E11.9 CONTROLLED TYPE 2 DIABETES MELLITUS WITHOUT COMPLICATION, WITH LONG-TERM CURRENT USE OF INSULIN (HCC): ICD-10-CM

## 2018-12-13 DIAGNOSIS — E11.9 CONTROLLED TYPE 2 DIABETES MELLITUS WITHOUT COMPLICATION, WITH LONG-TERM CURRENT USE OF INSULIN (HCC): ICD-10-CM

## 2018-12-13 DIAGNOSIS — Z79.4 CONTROLLED TYPE 2 DIABETES MELLITUS WITHOUT COMPLICATION, WITH LONG-TERM CURRENT USE OF INSULIN (HCC): ICD-10-CM

## 2018-12-13 DIAGNOSIS — E06.3 HYPOTHYROIDISM DUE TO HASHIMOTO'S THYROIDITIS: ICD-10-CM

## 2018-12-13 DIAGNOSIS — E55.9 VITAMIN D DEFICIENCY: Primary | ICD-10-CM

## 2018-12-13 DIAGNOSIS — E03.8 HYPOTHYROIDISM DUE TO HASHIMOTO'S THYROIDITIS: ICD-10-CM

## 2018-12-14 RX ORDER — INSULIN GLARGINE 300 U/ML
10 INJECTION, SOLUTION SUBCUTANEOUS DAILY
Qty: 4.5 ML | Refills: 0 | Status: SHIPPED | OUTPATIENT
Start: 2018-12-14 | End: 2019-01-03 | Stop reason: SDUPTHER

## 2018-12-18 RX ORDER — ROSUVASTATIN CALCIUM 40 MG/1
TABLET, COATED ORAL
Qty: 30 TABLET | Refills: 9 | Status: SHIPPED | OUTPATIENT
Start: 2018-12-18 | End: 2020-02-24 | Stop reason: SDUPTHER

## 2018-12-20 ENCOUNTER — LAB (OUTPATIENT)
Dept: ENDOCRINOLOGY | Age: 63
End: 2018-12-20

## 2018-12-20 DIAGNOSIS — E03.8 HYPOTHYROIDISM DUE TO HASHIMOTO'S THYROIDITIS: ICD-10-CM

## 2018-12-20 DIAGNOSIS — E06.3 HYPOTHYROIDISM DUE TO HASHIMOTO'S THYROIDITIS: ICD-10-CM

## 2018-12-20 DIAGNOSIS — Z79.4 CONTROLLED TYPE 2 DIABETES MELLITUS WITHOUT COMPLICATION, WITH LONG-TERM CURRENT USE OF INSULIN (HCC): ICD-10-CM

## 2018-12-20 DIAGNOSIS — E55.9 VITAMIN D DEFICIENCY: ICD-10-CM

## 2018-12-20 DIAGNOSIS — E11.9 CONTROLLED TYPE 2 DIABETES MELLITUS WITHOUT COMPLICATION, WITH LONG-TERM CURRENT USE OF INSULIN (HCC): ICD-10-CM

## 2018-12-23 LAB
25(OH)D3+25(OH)D2 SERPL-MCNC: 37.2 NG/ML (ref 30–100)
ALBUMIN SERPL-MCNC: 4.6 G/DL (ref 3.5–5.2)
ALBUMIN/GLOB SERPL: 2.1 G/DL
ALP SERPL-CCNC: 71 U/L (ref 39–117)
ALT SERPL-CCNC: 23 U/L (ref 1–33)
AST SERPL-CCNC: 19 U/L (ref 1–32)
BILIRUB SERPL-MCNC: 0.7 MG/DL (ref 0.1–1.2)
BUN SERPL-MCNC: 15 MG/DL (ref 8–23)
BUN/CREAT SERPL: 20.3 (ref 7–25)
C PEPTIDE SERPL-MCNC: 2.6 NG/ML (ref 1.1–4.4)
CALCIUM SERPL-MCNC: 9.6 MG/DL (ref 8.6–10.5)
CHLORIDE SERPL-SCNC: 102 MMOL/L (ref 98–107)
CHOLEST SERPL-MCNC: 250 MG/DL (ref 0–200)
CO2 SERPL-SCNC: 25.4 MMOL/L (ref 22–29)
CREAT SERPL-MCNC: 0.74 MG/DL (ref 0.57–1)
FT4I SERPL CALC-MCNC: 1.7 (ref 1.2–4.9)
GLOBULIN SER CALC-MCNC: 2.2 GM/DL
GLUCOSE SERPL-MCNC: 209 MG/DL (ref 65–99)
HBA1C MFR BLD: 7.2 % (ref 4.8–5.6)
HDLC SERPL-MCNC: 57 MG/DL (ref 40–60)
INTERPRETATION: NORMAL
LDLC SERPL CALC-MCNC: 163 MG/DL (ref 0–100)
Lab: NORMAL
MICROALBUMIN UR-MCNC: 11.2 UG/ML
POTASSIUM SERPL-SCNC: 4.5 MMOL/L (ref 3.5–5.2)
PROT SERPL-MCNC: 6.8 G/DL (ref 6–8.5)
SODIUM SERPL-SCNC: 140 MMOL/L (ref 136–145)
T3FREE SERPL-MCNC: 2.7 PG/ML (ref 2–4.4)
T3RU NFR SERPL: 23 % (ref 24–39)
T4 FREE SERPL-MCNC: 1.27 NG/DL (ref 0.93–1.7)
T4 SERPL-MCNC: 7.4 UG/DL (ref 4.5–12)
THYROGLOB AB SERPL-ACNC: <1 IU/ML
THYROGLOB SERPL-MCNC: 8.3 NG/ML
THYROGLOB SERPL-MCNC: NORMAL NG/ML
TRIGL SERPL-MCNC: 152 MG/DL (ref 0–150)
TSH SERPL DL<=0.005 MIU/L-ACNC: 1.97 UIU/ML (ref 0.45–4.5)
VLDLC SERPL CALC-MCNC: 30.4 MG/DL (ref 5–40)

## 2019-01-03 ENCOUNTER — OFFICE VISIT (OUTPATIENT)
Dept: ENDOCRINOLOGY | Age: 64
End: 2019-01-03

## 2019-01-03 VITALS
HEIGHT: 67 IN | SYSTOLIC BLOOD PRESSURE: 132 MMHG | BODY MASS INDEX: 24.14 KG/M2 | DIASTOLIC BLOOD PRESSURE: 74 MMHG | WEIGHT: 153.8 LBS

## 2019-01-03 DIAGNOSIS — E78.5 DYSLIPIDEMIA: ICD-10-CM

## 2019-01-03 DIAGNOSIS — I10 BENIGN ESSENTIAL HYPERTENSION: ICD-10-CM

## 2019-01-03 DIAGNOSIS — E11.9 CONTROLLED TYPE 2 DIABETES MELLITUS WITHOUT COMPLICATION, WITH LONG-TERM CURRENT USE OF INSULIN (HCC): Primary | ICD-10-CM

## 2019-01-03 DIAGNOSIS — E55.9 VITAMIN D DEFICIENCY: ICD-10-CM

## 2019-01-03 DIAGNOSIS — Z79.4 CONTROLLED TYPE 2 DIABETES MELLITUS WITHOUT COMPLICATION, WITH LONG-TERM CURRENT USE OF INSULIN (HCC): Primary | ICD-10-CM

## 2019-01-03 PROCEDURE — 99214 OFFICE O/P EST MOD 30 MIN: CPT | Performed by: NURSE PRACTITIONER

## 2019-01-03 RX ORDER — INSULIN GLARGINE 300 U/ML
12 INJECTION, SOLUTION SUBCUTANEOUS DAILY
Qty: 3 PEN | Refills: 5
Start: 2019-01-03 | End: 2019-07-11

## 2019-01-03 NOTE — PROGRESS NOTES
"Subjective   Magui Dumont is a 63 y.o. female is here today for follow-up.  Chief Complaint   Patient presents with   • Diabetes     recent labs, testing Bg several times weekly, pt did not bring meter   • Hypertension   • Hypothyroidism   • Vitamin D Deficiency   • Hyperlipidemia     /74   Ht 170.2 cm (67\")   Wt 69.8 kg (153 lb 12.8 oz)   BMI 24.09 kg/m²   Current Outpatient Medications on File Prior to Visit   Medication Sig   • aspirin 81 MG tablet Take  by mouth daily.   • citalopram (CeleXA) 20 MG tablet TAKE 1 TABLET BY MOUTH ONCE DAILY   • glimepiride (AMARYL) 4 MG tablet TAKE ONE TABLET BY MOUTH ONCE DAILY EVERY MORNING BEFORE BREAKFAST   • glucose blood test strip OneTouch Verio In Vitro Strip; Patient Sig: CHECK BLOOD GLUCOSE 1 TIME A DAY   • Insulin Pen Needle (BD PEN NEEDLE JUDIE U/F) 32G X 4 MM misc Use once daily   • JARDIANCE 25 MG tablet Take 1 tablet by mouth Daily.   • levothyroxine (SYNTHROID, LEVOTHROID) 112 MCG tablet Take 1 tablet by mouth Daily.   • Linagliptin-Metformin HCl (JENTADUETO) 2.5-850 MG tablet Take 1 tablet by mouth Daily.   • lisinopril-hydrochlorothiazide (PRINZIDE,ZESTORETIC) 20-12.5 MG per tablet Take 1 tablet by mouth Daily.   • Omega-3 Fatty Acids (FISH OIL) 1000 MG capsule delayed-release Take  by mouth daily.   • ONETOUCH DELICA LANCETS FINE misc    • rosuvastatin (CRESTOR) 40 MG tablet TAKE ONE TABLET BY MOUTH ONCE DAILY   • vitamin D (ERGOCALCIFEROL) 85801 units capsule capsule TAKE ONE CAPSULE BY MOUTH ONCE A WEEK   • [DISCONTINUED] TOUJEO SOLOSTAR 300 UNIT/ML solution pen-injector INJECT 10 UNITS UNDER THE SKIN DAILY     No current facility-administered medications on file prior to visit.      Family History   Problem Relation Age of Onset   • Cancer Mother         LUNG   • Lung cancer Brother         BILE DUCT     • Cancer Maternal Aunt         LUNG     Social History     Tobacco Use   • Smoking status: Never Smoker   • Smokeless tobacco: Never Used "   Substance Use Topics   • Alcohol use: No   • Drug use: No     No Known Allergies      History of Present Illness  Encounter Diagnoses   Name Primary?   • Benign essential hypertension    • Vitamin D deficiency    • Controlled type 2 diabetes mellitus without complication, with long-term current use of insulin (CMS/[facility]) Yes   • Dyslipidemia      63-year-old female patient here today for routine follow-up visit.  She had recent labs which were reviewed and she was provided a copy.  She has had increased stress and feels that this is contributing to her higher than usual blood sugars.  She did not bring her blood glucose meter with her to today's visit.  She has not had any hypoglycemic events.  We discussed increasing her insulin to lower her blood sugars.  She is taking all her medications as prescribed.  She does not needing refills.  She denies any signs and symptoms of hypothyroidism  The following portions of the patient's history were reviewed and updated as appropriate: allergies, current medications, past family history, past medical history, past social history, past surgical history and problem list.    Review of Systems   Constitutional: Negative for fatigue.   HENT: Negative for trouble swallowing.    Eyes: Negative for visual disturbance.   Cardiovascular: Negative for leg swelling.   Endocrine: Negative for polyphagia.   Skin: Negative for wound.   Neurological: Negative for numbness.       Objective   Physical Exam   Constitutional: She is oriented to person, place, and time. She appears well-developed and well-nourished. No distress.   HENT:   Head: Normocephalic and atraumatic.   Right Ear: External ear normal.   Left Ear: External ear normal.   Nose: Nose normal.   Mouth/Throat: Oropharynx is clear and moist. No oropharyngeal exudate.   Eyes: EOM are normal. Pupils are equal, round, and reactive to light. Right eye exhibits no discharge. Left eye exhibits no discharge.   Neck: Trachea normal,  normal range of motion and full passive range of motion without pain. Neck supple. No tracheal tenderness present. Carotid bruit is not present. No tracheal deviation, no edema and no erythema present. No thyroid mass and no thyromegaly present.   Cardiovascular: Normal rate, regular rhythm, normal heart sounds and intact distal pulses. Exam reveals no gallop and no friction rub.   No murmur heard.  Pulmonary/Chest: Effort normal and breath sounds normal. No stridor. No respiratory distress. She has no wheezes. She has no rales.   Abdominal: Soft. Bowel sounds are normal. She exhibits no distension.   Musculoskeletal: Normal range of motion. She exhibits no edema or deformity.   Lymphadenopathy:     She has no cervical adenopathy.   Neurological: She is alert and oriented to person, place, and time.   Skin: Skin is warm and dry. No rash noted. She is not diaphoretic. No erythema. No pallor.   Psychiatric: She has a normal mood and affect. Her behavior is normal. Judgment and thought content normal.   Nursing note and vitals reviewed.    Results for orders placed or performed in visit on 12/20/18   Comprehensive Metabolic Panel   Result Value Ref Range    Glucose 209 (H) 65 - 99 mg/dL    BUN 15 8 - 23 mg/dL    Creatinine 0.74 0.57 - 1.00 mg/dL    eGFR Non African Am 79 >60 mL/min/1.73    eGFR African Am 96 >60 mL/min/1.73    BUN/Creatinine Ratio 20.3 7.0 - 25.0    Sodium 140 136 - 145 mmol/L    Potassium 4.5 3.5 - 5.2 mmol/L    Chloride 102 98 - 107 mmol/L    Total CO2 25.4 22.0 - 29.0 mmol/L    Calcium 9.6 8.6 - 10.5 mg/dL    Total Protein 6.8 6.0 - 8.5 g/dL    Albumin 4.60 3.50 - 5.20 g/dL    Globulin 2.2 gm/dL    A/G Ratio 2.1 g/dL    Total Bilirubin 0.7 0.1 - 1.2 mg/dL    Alkaline Phosphatase 71 39 - 117 U/L    AST (SGOT) 19 1 - 32 U/L    ALT (SGPT) 23 1 - 33 U/L   Comprehensive Thyroglobulin   Result Value Ref Range    Thyroglobulin Ab <1.0 IU/mL    Thyroglobulin 8.3 ng/mL    Thyroglobulin (TG-ANN) Comment ng/mL    C-Peptide   Result Value Ref Range    C-Peptide 2.6 1.1 - 4.4 ng/mL   Hemoglobin A1c   Result Value Ref Range    Hemoglobin A1C 7.20 (H) 4.80 - 5.60 %   Lipid Panel   Result Value Ref Range    Total Cholesterol 250 (H) 0 - 200 mg/dL    Triglycerides 152 (H) 0 - 150 mg/dL    HDL Cholesterol 57 40 - 60 mg/dL    VLDL Cholesterol 30.4 5 - 40 mg/dL    LDL Cholesterol  163 (H) 0 - 100 mg/dL   MicroAlbumin, Urine, Random - Urine, Clean Catch   Result Value Ref Range    Microalbumin, Urine 11.2 Not Estab. ug/mL   T3, Free   Result Value Ref Range    T3, Free 2.7 2.0 - 4.4 pg/mL   T4, Free   Result Value Ref Range    Free T4 1.27 0.93 - 1.70 ng/dL   Thyroid Panel With TSH   Result Value Ref Range    TSH 1.970 0.450 - 4.500 uIU/mL    T4, Total 7.4 4.5 - 12.0 ug/dL    T3 Uptake 23 (L) 24 - 39 %    Free Thyroxine Index 1.7 1.2 - 4.9   Vitamin D 25 Hydroxy   Result Value Ref Range    25 Hydroxy, Vitamin D 37.2 30.0 - 100.0 ng/ml   Cardiovascular Risk Assessment   Result Value Ref Range    Interpretation Note    Diabetes Patient Education   Result Value Ref Range    PDF Image Not applicable          Assessment/Plan   Problems Addressed this Visit        Cardiovascular and Mediastinum    Benign essential hypertension       Digestive    Vitamin D deficiency       Endocrine    Controlled type 2 diabetes mellitus without complication (CMS/[facility]) - Primary    Relevant Medications    TOUJEO SOLOSTAR 300 UNIT/ML solution pen-injector       Other    Dyslipidemia          In summary, patient was seen and examined.  Her hemoglobin A1c has gone up slightly.  Metabolically she is stable we'll continue all her current medications as prescribed.  She will follow-up with Dr. Mcfarland or myself in 6 months with labs prior.  I've encouraged her to reach out to the office should she may questions or concerns.  We've increased her toujeo to 12 units once daily.  She's been encouraged to contact the office her blood sugars fail to improve.

## 2019-03-13 DIAGNOSIS — E03.9 HYPOTHYROIDISM, UNSPECIFIED TYPE: ICD-10-CM

## 2019-03-13 DIAGNOSIS — E55.9 VITAMIN D DEFICIENCY: ICD-10-CM

## 2019-03-13 DIAGNOSIS — I10 BENIGN ESSENTIAL HYPERTENSION: ICD-10-CM

## 2019-03-13 DIAGNOSIS — E11.9 CONTROLLED TYPE 2 DIABETES MELLITUS WITHOUT COMPLICATION (HCC): ICD-10-CM

## 2019-03-13 DIAGNOSIS — E78.5 DYSLIPIDEMIA: ICD-10-CM

## 2019-03-13 RX ORDER — LEVOTHYROXINE SODIUM 112 UG/1
TABLET ORAL
Qty: 90 TABLET | Refills: 1 | Status: SHIPPED | OUTPATIENT
Start: 2019-03-13 | End: 2019-09-10 | Stop reason: SDUPTHER

## 2019-03-14 ENCOUNTER — OFFICE VISIT (OUTPATIENT)
Dept: INTERNAL MEDICINE | Age: 64
End: 2019-03-14

## 2019-03-14 VITALS
SYSTOLIC BLOOD PRESSURE: 122 MMHG | WEIGHT: 156 LBS | BODY MASS INDEX: 24.48 KG/M2 | DIASTOLIC BLOOD PRESSURE: 74 MMHG | HEART RATE: 100 BPM | OXYGEN SATURATION: 98 % | TEMPERATURE: 98.2 F | HEIGHT: 67 IN

## 2019-03-14 DIAGNOSIS — I10 BENIGN ESSENTIAL HYPERTENSION: Primary | Chronic | ICD-10-CM

## 2019-03-14 DIAGNOSIS — F32.A DEPRESSION, UNSPECIFIED DEPRESSION TYPE: Chronic | ICD-10-CM

## 2019-03-14 DIAGNOSIS — E78.5 DYSLIPIDEMIA: Chronic | ICD-10-CM

## 2019-03-14 DIAGNOSIS — Z23 NEED FOR PNEUMOCOCCAL VACCINE: ICD-10-CM

## 2019-03-14 DIAGNOSIS — E11.9 TYPE 2 DIABETES MELLITUS WITHOUT COMPLICATION, WITHOUT LONG-TERM CURRENT USE OF INSULIN (HCC): Chronic | ICD-10-CM

## 2019-03-14 DIAGNOSIS — E03.8 HYPOTHYROIDISM DUE TO HASHIMOTO'S THYROIDITIS: Chronic | ICD-10-CM

## 2019-03-14 DIAGNOSIS — E06.3 HYPOTHYROIDISM DUE TO HASHIMOTO'S THYROIDITIS: Chronic | ICD-10-CM

## 2019-03-14 PROCEDURE — 90732 PPSV23 VACC 2 YRS+ SUBQ/IM: CPT | Performed by: INTERNAL MEDICINE

## 2019-03-14 PROCEDURE — 99215 OFFICE O/P EST HI 40 MIN: CPT | Performed by: INTERNAL MEDICINE

## 2019-03-14 PROCEDURE — 90471 IMMUNIZATION ADMIN: CPT | Performed by: INTERNAL MEDICINE

## 2019-03-14 NOTE — PROGRESS NOTES
[facility] [place] [facility]  ANTONIA ZUNIGA M.D.      Magui Dumont / 63 y.o. / female  03/14/2019      ASSESSMENT & PLAN:    Problem List Items Addressed This Visit        High    Type 2 diabetes mellitus without complication, without long-term current use of insulin (CMS/HCC) (Chronic)    Overview     *Managed by endocrinologist         Current Assessment & Plan     On multidrug regimen including Jardiance 25 mg, glimepiride 4 mg, Jentadueto 2.5/850 mg and Toujeo Solostar insulin 12 units daily.    Lab Results   Component Value Date    HGBA1C 7.20 (H) 12/20/2018    HGBA1C 6.33 (H) 06/01/2018    HGBA1C 6.30 (H) 01/26/2018      Diabetes is mildly uncontrolled.  Continue follow-up with endocrinology.  Will defer treatment to them.         Relevant Medications    JARDIANCE 25 MG tablet    vitamin D (ERGOCALCIFEROL) 31813 units capsule capsule    glimepiride (AMARYL) 4 MG tablet    lisinopril-hydrochlorothiazide (PRINZIDE,ZESTORETIC) 20-12.5 MG per tablet    TOUJEO SOLOSTAR 300 UNIT/ML solution pen-injector    Linagliptin-metFORMIN HCl (JENTADUETO) 2.5-850 MG tablet    levothyroxine (SYNTHROID, LEVOTHROID) 112 MCG tablet       Medium    Benign essential hypertension - Primary (Chronic)    Current Assessment & Plan     Stable. Continue lisinopril-hctz 20/12.5 mg qd.          Relevant Medications    vitamin D (ERGOCALCIFEROL) 03496 units capsule capsule    lisinopril-hydrochlorothiazide (PRINZIDE,ZESTORETIC) 20-12.5 MG per tablet    levothyroxine (SYNTHROID, LEVOTHROID) 112 MCG tablet    Depression (Chronic)    Current Assessment & Plan     Stable. Continue citalopram 20 mg qd.          Relevant Medications    citalopram (CeleXA) 20 MG tablet    Dyslipidemia (Chronic)    Overview     *Managed by endocrinologist.    Lab Results   Component Value Date     (H) 12/20/2018     (H) 06/01/2018     (H) 01/26/2018    HDL 57 12/20/2018    HDL 52 06/01/2018    HDL 54 01/26/2018    TRIG 152 (H) 12/20/2018     "  Uncontrolled. Previously reports having run out of medication.   Continue rosuvastatin 40 mg daily.          Relevant Medications    vitamin D (ERGOCALCIFEROL) 68036 units capsule capsule    lisinopril-hydrochlorothiazide (PRINZIDE,ZESTORETIC) 20-12.5 MG per tablet    levothyroxine (SYNTHROID, LEVOTHROID) 112 MCG tablet       Low    Hypothyroidism (Chronic)    Overview     *Managed by endocrinology    Stable. Continue current dose of levothyroxine at 112 mcg.           Relevant Medications    vitamin D (ERGOCALCIFEROL) 81483 units capsule capsule    levothyroxine (SYNTHROID, LEVOTHROID) 112 MCG tablet      Other Visit Diagnoses     Need for pneumococcal vaccine        Relevant Orders    Pneumococcal Polysaccharide Vaccine 23-Valent Greater Than or Equal To 3yo Subcutaneous / IM (Completed)        Orders Placed This Encounter   Procedures   • Pneumococcal Polysaccharide Vaccine 23-Valent Greater Than or Equal To 3yo Subcutaneous / IM     No orders of the defined types were placed in this encounter.      Summary/Discussion:  Spent 40 minutes in face-to-face encounter time and >50% of time spent in counseling regarding above medical problems/issues.      Return in about 6 months (around 9/14/2019) for Reassess chronic medical problems.    ____________________________________________________________________    VITALS:    Visit Vitals  /74   Pulse 100   Temp 98.2 °F (36.8 °C)   Ht 170.2 cm (67.01\")   Wt 70.8 kg (156 lb)   SpO2 98%   BMI 24.43 kg/m²       BP Readings from Last 3 Encounters:   03/14/19 122/74   01/03/19 132/74   06/22/18 120/80     Wt Readings from Last 3 Encounters:   03/14/19 70.8 kg (156 lb)   01/03/19 69.8 kg (153 lb 12.8 oz)   06/22/18 69 kg (152 lb 3.2 oz)      Body mass index is 24.43 kg/m².    CC: Main reason(s) for today's visit: Establish Care and Diabetes      HPI:    Patient is a 63 y.o. female who is here to establish care.  Dr. Aldrich was her previous primary care physician.      She " has history of type 2 diabetes without known complications on multidrug regimen including Toujeo insulin, Jardiance, glimepiride, Jentadueto.  Diabetes is managed by her endocrinology team.  Patient denies significant hypoglycemia.  Most recent A1c level was 7.2 which was higher than prior levels.    She has history of chronic essential hypertension stable on lisinopril hydrochlorothiazide 20/12.5 mg daily.  Denies headaches or excessive lightheadedness.    Hypothyroidism on levothyroxine 112 mcg managed by endocrinology.    Depression is stable on citalopram 20 mg daily.    Hyperlipidemia on rosuvastatin 40 mg.  Last 2 LDL levels were greater than 170 but patient reports she ran out of medication at the time of lab work.  Denies significant myalgia.      Patient Care Team:  Venkat Brown MD as PCP - General (Internal Medicine)  Harsh Velasco MD as Consulting Physician (Obstetrics and Gynecology)  ____________________________________________________________________      REVIEW OF SYSTEMS    Review of Systems   Constitutional: Negative.    HENT: Negative.    Eyes: Negative.    Respiratory: Negative.    Cardiovascular: Negative.    Gastrointestinal: Negative.    Endocrine: Negative.    Genitourinary: Negative.    Musculoskeletal: Negative.  Negative for myalgias.   Skin: Negative.    Allergic/Immunologic: Negative.    Neurological: Negative.  Negative for light-headedness and headaches.   Hematological: Negative.  Does not bruise/bleed easily.   Psychiatric/Behavioral: Negative.         Stable depression        PHYSICAL EXAMINATION    Physical Exam   Constitutional: She is oriented to person, place, and time. She appears well-developed and well-nourished. No distress.   HENT:   Head: Normocephalic and atraumatic.   Right Ear: Tympanic membrane normal.   Left Ear: Tympanic membrane normal.   Mouth/Throat: Mucous membranes are normal. No oral lesions.   Eyes: Conjunctivae are normal. No scleral icterus.   Neck:  Neck supple. No tracheal deviation present. No thyroid mass and no thyromegaly present.   Cardiovascular: Normal rate, regular rhythm, normal heart sounds and intact distal pulses.   Pulmonary/Chest: Effort normal and breath sounds normal.   Abdominal: Soft. Bowel sounds are normal. She exhibits no distension and no mass. There is no tenderness. No hernia.   Musculoskeletal: She exhibits no edema.   Lymphadenopathy:     She has no cervical adenopathy.        Right: No supraclavicular adenopathy present.        Left: No supraclavicular adenopathy present.   Neurological: She is alert and oriented to person, place, and time. She has normal reflexes. No cranial nerve deficit. She exhibits normal muscle tone. Coordination normal.   Skin: Skin is warm. No rash noted. No pallor.   Psychiatric: She has a normal mood and affect. Her behavior is normal. Judgment and thought content normal.         REVIEWED DATA:    Labs:   Lab Results   Component Value Date     12/20/2018    K 4.5 12/20/2018    AST 19 12/20/2018    ALT 23 12/20/2018    BUN 15 12/20/2018    CREATININE 0.74 12/20/2018    CREATININE 0.80 06/01/2018    CREATININE 0.80 01/26/2018    EGFRIFNONA 79 12/20/2018    EGFRIFAFRI 96 12/20/2018       Lab Results   Component Value Date    HGBA1C 7.20 (H) 12/20/2018    HGBA1C 6.33 (H) 06/01/2018    HGBA1C 6.30 (H) 01/26/2018    MICROALBUR 11.2 12/20/2018       Lab Results   Component Value Date     (H) 12/20/2018     (H) 06/01/2018     (H) 01/26/2018    HDL 57 12/20/2018    TRIG 152 (H) 12/20/2018       Lab Results   Component Value Date    TSH 1.970 12/20/2018    FREET4 1.27 12/20/2018          Lab Results   Component Value Date    WBC 6.46 12/14/2017    HGB 14.1 12/14/2017     12/14/2017         Imaging:        Medical Tests:        Summary of old records / correspondence / consultant report:        Request outside records:         ______________________________________________________________________    ALLERGIES  No Known Allergies     MEDICATIONS  Current Outpatient Medications   Medication Sig Dispense Refill   • aspirin 81 MG tablet Take  by mouth daily.     • citalopram (CeleXA) 20 MG tablet TAKE 1 TABLET BY MOUTH ONCE DAILY 90 tablet 0   • glimepiride (AMARYL) 4 MG tablet TAKE ONE TABLET BY MOUTH ONCE DAILY EVERY MORNING BEFORE BREAKFAST 30 tablet 5   • glucose blood test strip OneTouch Verio In Vitro Strip; Patient Sig: CHECK BLOOD GLUCOSE 1 TIME A  each 1   • Insulin Pen Needle (BD PEN NEEDLE JUDIE U/F) 32G X 4 MM misc Use once daily 100 each 0   • JARDIANCE 25 MG tablet Take 1 tablet by mouth Daily. 30 tablet 5   • levothyroxine (SYNTHROID, LEVOTHROID) 112 MCG tablet TAKE 1 TABLET BY MOUTH ONCE DAILY 90 tablet 1   • Linagliptin-metFORMIN HCl (JENTADUETO) 2.5-850 MG tablet Take 1 tablet by mouth Daily. 90 tablet 0   • lisinopril-hydrochlorothiazide (PRINZIDE,ZESTORETIC) 20-12.5 MG per tablet Take 1 tablet by mouth Daily. 90 tablet 0   • Omega-3 Fatty Acids (FISH OIL) 1000 MG capsule delayed-release Take  by mouth daily.     • ONETOUCH DELICA LANCETS FINE misc      • rosuvastatin (CRESTOR) 40 MG tablet TAKE ONE TABLET BY MOUTH ONCE DAILY 30 tablet 9   • TOUJEO SOLOSTAR 300 UNIT/ML solution pen-injector Inject 12 Units under the skin into the appropriate area as directed Daily. 3 pen 5   • vitamin D (ERGOCALCIFEROL) 73096 units capsule capsule TAKE ONE CAPSULE BY MOUTH ONCE A WEEK 4 capsule 12     No current facility-administered medications for this visit.        PFSH:     The following portions of the patient's history were reviewed and updated as appropriate: Allergies / Current Medications / Past Medical History / Surgical History / Social History / Family History    PROBLEM LIST   Patient Active Problem List   Diagnosis   • Benign essential hypertension   • Depression   • Type 2 diabetes mellitus without complication,  without long-term current use of insulin (CMS/HCC)   • Dyslipidemia   • Hypothyroidism   • Vitamin D deficiency       PAST MEDICAL HISTORY  Past Medical History:   Diagnosis Date   • Hyperlipidemia    • Hypertension    • Hypothyroidism    • Type 2 diabetes mellitus (CMS/HCC)    • Vitamin D deficiency        SURGICAL HISTORY  Past Surgical History:   Procedure Laterality Date   • CHOLECYSTECTOMY     • COLONOSCOPY N/A 9/15/2017    Procedure: COLONOSCOPY;  Surgeon: Brian Puckett MD;  Location: [facility] ENDOSCOPY;  Service:    • EYE SURGERY Left 2015    cataract    • TONSILLECTOMY         SOCIAL HISTORY  Social History     Socioeconomic History   • Marital status:      Spouse name: Dallas*   • Number of children: 2   • Years of education: Not on file   • Highest education level: Not on file   Occupational History   • Occupation: Part-time /    Tobacco Use   • Smoking status: Never Smoker   • Smokeless tobacco: Never Used   Substance and Sexual Activity   • Alcohol use: No   • Drug use: No   • Sexual activity: Defer       FAMILY HISTORY  Family History   Problem Relation Age of Onset   • Lung cancer Mother         smoker   • Cancer Brother         cholangiocarcinoma ( 54)   • Lung cancer Maternal Aunt         Adenocarcinoma (6 sisters)   • Diabetes type II Father    • No Known Problems Sister    • No Known Problems Sister          **Dragon Disclaimer:   Much of this encounter note is an electronic transcription/translation of spoken language to printed text. The electronic translation of spoken language may permit erroneous, or at times, nonsensical words or phrases to be inadvertently transcribed. Although I have reviewed the note for such errors, some may still exist.       Template created by Bear Brown MD

## 2019-03-14 NOTE — ASSESSMENT & PLAN NOTE
On multidrug regimen including Jardiance 25 mg, glimepiride 4 mg, Jentadueto 2.5/850 mg and Toujeo Solostar insulin 12 units daily.    Lab Results   Component Value Date    HGBA1C 7.20 (H) 12/20/2018    HGBA1C 6.33 (H) 06/01/2018    HGBA1C 6.30 (H) 01/26/2018      Diabetes is mildly uncontrolled.  Continue follow-up with endocrinology.  Will defer treatment to them.

## 2019-03-15 RX ORDER — INSULIN GLARGINE 300 U/ML
10 INJECTION, SOLUTION SUBCUTANEOUS DAILY
Qty: 4.5 ML | Refills: 0 | Status: SHIPPED | OUTPATIENT
Start: 2019-03-15 | End: 2019-06-13 | Stop reason: SDUPTHER

## 2019-04-26 RX ORDER — GLIMEPIRIDE 4 MG/1
TABLET ORAL
Qty: 30 TABLET | Refills: 5 | Status: SHIPPED | OUTPATIENT
Start: 2019-04-26 | End: 2019-10-08 | Stop reason: SDUPTHER

## 2019-05-10 DIAGNOSIS — E78.5 DYSLIPIDEMIA: ICD-10-CM

## 2019-05-10 DIAGNOSIS — E55.9 VITAMIN D DEFICIENCY: ICD-10-CM

## 2019-05-10 DIAGNOSIS — E11.9 CONTROLLED TYPE 2 DIABETES MELLITUS WITHOUT COMPLICATION (HCC): ICD-10-CM

## 2019-05-10 DIAGNOSIS — I10 BENIGN ESSENTIAL HYPERTENSION: ICD-10-CM

## 2019-05-13 RX ORDER — LISINOPRIL AND HYDROCHLOROTHIAZIDE 20; 12.5 MG/1; MG/1
TABLET ORAL
Qty: 90 TABLET | Refills: 0 | Status: SHIPPED | OUTPATIENT
Start: 2019-05-13 | End: 2019-11-04 | Stop reason: SDUPTHER

## 2019-06-13 RX ORDER — INSULIN GLARGINE 300 U/ML
10 INJECTION, SOLUTION SUBCUTANEOUS DAILY
Qty: 4.5 ML | Refills: 0 | Status: SHIPPED | OUTPATIENT
Start: 2019-06-13 | End: 2019-07-11 | Stop reason: DRUGHIGH

## 2019-06-17 DIAGNOSIS — E55.9 VITAMIN D DEFICIENCY: Primary | ICD-10-CM

## 2019-06-17 DIAGNOSIS — E11.9 CONTROLLED TYPE 2 DIABETES MELLITUS WITHOUT COMPLICATION, UNSPECIFIED WHETHER LONG TERM INSULIN USE (HCC): ICD-10-CM

## 2019-06-17 DIAGNOSIS — E78.5 DYSLIPIDEMIA: ICD-10-CM

## 2019-06-17 DIAGNOSIS — E03.9 HYPOTHYROIDISM, UNSPECIFIED TYPE: ICD-10-CM

## 2019-06-19 DIAGNOSIS — E55.9 VITAMIN D DEFICIENCY: ICD-10-CM

## 2019-06-19 DIAGNOSIS — I10 BENIGN ESSENTIAL HYPERTENSION: ICD-10-CM

## 2019-06-19 DIAGNOSIS — E78.5 DYSLIPIDEMIA: ICD-10-CM

## 2019-06-19 DIAGNOSIS — E03.9 HYPOTHYROIDISM, UNSPECIFIED TYPE: ICD-10-CM

## 2019-06-19 DIAGNOSIS — E11.9 CONTROLLED TYPE 2 DIABETES MELLITUS WITHOUT COMPLICATION (HCC): ICD-10-CM

## 2019-06-19 RX ORDER — ERGOCALCIFEROL 1.25 MG/1
CAPSULE ORAL
Qty: 4 CAPSULE | Refills: 12 | Status: SHIPPED | OUTPATIENT
Start: 2019-06-19 | End: 2020-02-24 | Stop reason: SDUPTHER

## 2019-06-24 ENCOUNTER — LAB (OUTPATIENT)
Dept: ENDOCRINOLOGY | Age: 64
End: 2019-06-24

## 2019-06-24 DIAGNOSIS — E78.5 DYSLIPIDEMIA: ICD-10-CM

## 2019-06-24 DIAGNOSIS — E55.9 VITAMIN D DEFICIENCY: ICD-10-CM

## 2019-06-24 DIAGNOSIS — E03.9 HYPOTHYROIDISM, UNSPECIFIED TYPE: ICD-10-CM

## 2019-06-24 DIAGNOSIS — E11.9 CONTROLLED TYPE 2 DIABETES MELLITUS WITHOUT COMPLICATION, UNSPECIFIED WHETHER LONG TERM INSULIN USE (HCC): ICD-10-CM

## 2019-06-26 LAB
25(OH)D3+25(OH)D2 SERPL-MCNC: 34.8 NG/ML (ref 30–100)
ALBUMIN SERPL-MCNC: 4.6 G/DL (ref 3.5–5.2)
ALBUMIN/GLOB SERPL: 2.4 G/DL
ALP SERPL-CCNC: 62 U/L (ref 39–117)
ALT SERPL-CCNC: 19 U/L (ref 1–33)
AST SERPL-CCNC: 15 U/L (ref 1–32)
BILIRUB SERPL-MCNC: 0.9 MG/DL (ref 0.2–1.2)
BUN SERPL-MCNC: 15 MG/DL (ref 8–23)
BUN/CREAT SERPL: 21.1 (ref 7–25)
C PEPTIDE SERPL-MCNC: 2.4 NG/ML (ref 1.1–4.4)
CALCIUM SERPL-MCNC: 9.2 MG/DL (ref 8.6–10.5)
CHLORIDE SERPL-SCNC: 103 MMOL/L (ref 98–107)
CHOLEST SERPL-MCNC: 139 MG/DL (ref 0–200)
CO2 SERPL-SCNC: 24 MMOL/L (ref 22–29)
CREAT SERPL-MCNC: 0.71 MG/DL (ref 0.57–1)
FT4I SERPL CALC-MCNC: 2.7 (ref 1.2–4.9)
GLOBULIN SER CALC-MCNC: 1.9 GM/DL
GLUCOSE SERPL-MCNC: 191 MG/DL (ref 65–99)
HBA1C MFR BLD: 8.1 % (ref 4.8–5.6)
HDLC SERPL-MCNC: 45 MG/DL (ref 40–60)
INTERPRETATION: NORMAL
LDLC SERPL CALC-MCNC: 74 MG/DL (ref 0–100)
Lab: NORMAL
POTASSIUM SERPL-SCNC: 4.3 MMOL/L (ref 3.5–5.2)
PROT SERPL-MCNC: 6.5 G/DL (ref 6–8.5)
SODIUM SERPL-SCNC: 140 MMOL/L (ref 136–145)
T3FREE SERPL-MCNC: 2.8 PG/ML (ref 2–4.4)
T3RU NFR SERPL: 28 % (ref 24–39)
T4 FREE SERPL-MCNC: 1.66 NG/DL (ref 0.93–1.7)
T4 SERPL-MCNC: 9.6 UG/DL (ref 4.5–12)
THYROGLOB AB SERPL-ACNC: <1 IU/ML
THYROGLOB SERPL-MCNC: 5.6 NG/ML
THYROGLOB SERPL-MCNC: NORMAL NG/ML
TRIGL SERPL-MCNC: 98 MG/DL (ref 0–150)
TSH SERPL DL<=0.005 MIU/L-ACNC: 1 UIU/ML (ref 0.45–4.5)
VLDLC SERPL CALC-MCNC: 19.6 MG/DL

## 2019-06-26 RX ORDER — CITALOPRAM 20 MG/1
TABLET ORAL
Qty: 90 TABLET | Refills: 0 | Status: SHIPPED | OUTPATIENT
Start: 2019-06-26 | End: 2020-01-06

## 2019-07-11 ENCOUNTER — OFFICE VISIT (OUTPATIENT)
Dept: ENDOCRINOLOGY | Age: 64
End: 2019-07-11

## 2019-07-11 VITALS
SYSTOLIC BLOOD PRESSURE: 126 MMHG | BODY MASS INDEX: 23.86 KG/M2 | HEIGHT: 67 IN | WEIGHT: 152 LBS | DIASTOLIC BLOOD PRESSURE: 76 MMHG

## 2019-07-11 DIAGNOSIS — E78.5 DYSLIPIDEMIA: Chronic | ICD-10-CM

## 2019-07-11 DIAGNOSIS — E03.8 HYPOTHYROIDISM DUE TO HASHIMOTO'S THYROIDITIS: Chronic | ICD-10-CM

## 2019-07-11 DIAGNOSIS — E55.9 VITAMIN D DEFICIENCY: ICD-10-CM

## 2019-07-11 DIAGNOSIS — E06.3 HYPOTHYROIDISM DUE TO HASHIMOTO'S THYROIDITIS: Chronic | ICD-10-CM

## 2019-07-11 DIAGNOSIS — I10 BENIGN ESSENTIAL HYPERTENSION: Chronic | ICD-10-CM

## 2019-07-11 DIAGNOSIS — E11.9 TYPE 2 DIABETES MELLITUS WITHOUT COMPLICATION, WITH LONG-TERM CURRENT USE OF INSULIN (HCC): Primary | ICD-10-CM

## 2019-07-11 DIAGNOSIS — Z79.4 TYPE 2 DIABETES MELLITUS WITHOUT COMPLICATION, WITH LONG-TERM CURRENT USE OF INSULIN (HCC): Primary | ICD-10-CM

## 2019-07-11 PROCEDURE — 99214 OFFICE O/P EST MOD 30 MIN: CPT | Performed by: NURSE PRACTITIONER

## 2019-07-11 RX ORDER — INSULIN GLARGINE 300 U/ML
20 INJECTION, SOLUTION SUBCUTANEOUS DAILY
Qty: 3 PEN | Refills: 5
Start: 2019-07-11 | End: 2020-02-24

## 2019-07-11 NOTE — PATIENT INSTRUCTIONS
Increase Toujeo to 20 units   Call the office if any low sugars 70 or below   Continue all other current medications

## 2019-07-11 NOTE — PROGRESS NOTES
"Subjective   Magui Dumont is a 63 y.o. female is here today for follow-up.  Chief Complaint   Patient presents with   • Hyperlipidemia     recent labs, testing BG 3 times a week, pt did not bring meter   • Hypertension   • Diabetes   • Vitamin D Deficiency     /76   Ht 170.2 cm (67\")   Wt 68.9 kg (152 lb)   BMI 23.81 kg/m²   Social History     Socioeconomic History   • Marital status:      Spouse name: Dallas*   • Number of children: 2   • Years of education: Not on file   • Highest education level: Not on file   Occupational History   • Occupation: Part-time /    Tobacco Use   • Smoking status: Never Smoker   • Smokeless tobacco: Never Used   Substance and Sexual Activity   • Alcohol use: No   • Drug use: No   • Sexual activity: Defer   Lifestyle   • Physical activity:     Days per week: 3 days     Minutes per session: Not on file   • Stress: Not on file     Family History   Problem Relation Age of Onset   • Lung cancer Mother         smoker   • Cancer Brother         cholangiocarcinoma ( 54)   • Lung cancer Maternal Aunt         Adenocarcinoma (6 sisters)   • Diabetes type II Father    • No Known Problems Sister    • No Known Problems Sister      Current Outpatient Medications on File Prior to Visit   Medication Sig   • aspirin 81 MG tablet Take  by mouth daily.   • citalopram (CeleXA) 20 MG tablet TAKE 1 TABLET BY MOUTH ONCE DAILY   • glimepiride (AMARYL) 4 MG tablet TAKE 1 TABLET BY MOUTH ONCE DAILY EVERY MORNING BEFORE BREAKFAST   • glucose blood test strip OneTouch Verio In Vitro Strip; Patient Sig: CHECK BLOOD GLUCOSE 1 TIME A DAY   • Insulin Pen Needle (RELION PEN NEEDLES) 32G X 4 MM misc USE 1 PEN NEEDLE ONCE DAILY   • JARDIANCE 25 MG tablet Take 1 tablet by mouth Daily.   • levothyroxine (SYNTHROID, LEVOTHROID) 112 MCG tablet TAKE 1 TABLET BY MOUTH ONCE DAILY   • Linagliptin-metFORMIN HCl (JENTADUETO) 2.5-850 MG tablet Take 1 tablet by mouth Daily.   • " lisinopril-hydrochlorothiazide (PRINZIDE,ZESTORETIC) 20-12.5 MG per tablet TAKE 1 TABLET BY MOUTH ONCE DAILY   • ONETOUCH DELICA LANCETS FINE misc    • rosuvastatin (CRESTOR) 40 MG tablet TAKE ONE TABLET BY MOUTH ONCE DAILY   • TOUJEO SOLOSTAR 300 UNIT/ML solution pen-injector Inject 12 Units under the skin into the appropriate area as directed Daily.   • vitamin D (ERGOCALCIFEROL) 48319 units capsule capsule TAKE 1 CAPSULE BY MOUTH ONCE A WEEK   • Omega-3 Fatty Acids (FISH OIL) 1000 MG capsule delayed-release Take  by mouth daily.   • [DISCONTINUED] TOUJEO SOLOSTAR 300 UNIT/ML solution pen-injector INJECT 10 UNITS UNDER THE SKIN DAILY     No current facility-administered medications on file prior to visit.      No Known Allergies      History of Present Illness   Encounter Diagnoses   Name Primary?   • Benign essential hypertension    • Dyslipidemia    • Hypothyroidism due to Hashimoto's thyroiditis    • Type 2 diabetes mellitus without complication, with long-term current use of insulin (CMS/[facility]) Yes   • Vitamin D deficiency      Patient is a 63-year-old female being seen for the above-mentioned problems.  Recent labs reviewed and patient was provided a copy. Medication list was reviewed.  She is taking all her medications as prescribed.  She is testing her blood sugar twice weekly. Fasting blood sugars range from 140-180.  She is experiencing no lows with the lowest reading at 130. Postprandial sugars average 150. She is experiencing no thyroid symptoms.  Her diet has stayed consistent.  She has decreased exercise.    The following portions of the patient's history were reviewed and updated as appropriate: allergies, current medications, past family history, past medical history, past social history, past surgical history and problem list.    Review of Systems   Constitutional: Negative for fatigue.   HENT: Negative for trouble swallowing.    Eyes: Negative for visual disturbance.   Cardiovascular: Negative for  leg swelling.   Endocrine: Negative for polyuria.   Skin: Negative for wound.   Neurological: Negative for numbness.       Objective   Physical Exam   Constitutional: She is oriented to person, place, and time. She appears well-developed and well-nourished. No distress.   HENT:   Head: Normocephalic and atraumatic.   Right Ear: External ear normal.   Left Ear: External ear normal.   Nose: Nose normal.   Mouth/Throat: Oropharynx is clear and moist. No oropharyngeal exudate.   Eyes: EOM are normal. Pupils are equal, round, and reactive to light. Right eye exhibits no discharge. Left eye exhibits no discharge.   Neck: Trachea normal, normal range of motion and full passive range of motion without pain. Neck supple. No tracheal tenderness present. Carotid bruit is not present. No tracheal deviation, no edema and no erythema present. No thyroid mass and no thyromegaly present.   Cardiovascular: Normal rate, regular rhythm, normal heart sounds and intact distal pulses. Exam reveals no gallop and no friction rub.   No murmur heard.  Pulmonary/Chest: Effort normal and breath sounds normal. No stridor. No respiratory distress. She has no wheezes. She has no rales.   Abdominal: Soft. Bowel sounds are normal. She exhibits no distension.   Musculoskeletal: Normal range of motion. She exhibits no edema or deformity.   Lymphadenopathy:     She has no cervical adenopathy.   Neurological: She is alert and oriented to person, place, and time.   Skin: Skin is warm and dry. No rash noted. She is not diaphoretic. No erythema. No pallor.   Psychiatric: She has a normal mood and affect. Her behavior is normal. Judgment and thought content normal.   Nursing note and vitals reviewed.     Results for orders placed or performed in visit on 06/24/19   Comprehensive Thyroglobulin   Result Value Ref Range    Thyroglobulin Ab <1.0 IU/mL    Thyroglobulin 5.6 ng/mL    Thyroglobulin (TG-ANN) Comment ng/mL   Thyroid Panel With TSH   Result Value Ref  Range    TSH 0.997 0.450 - 4.500 uIU/mL    T4, Total 9.6 4.5 - 12.0 ug/dL    T3 Uptake 28 24 - 39 %    Free Thyroxine Index 2.7 1.2 - 4.9   T4, Free   Result Value Ref Range    Free T4 1.66 0.93 - 1.70 ng/dL   T3, Free   Result Value Ref Range    T3, Free 2.8 2.0 - 4.4 pg/mL   C-Peptide   Result Value Ref Range    C-Peptide 2.4 1.1 - 4.4 ng/mL   Hemoglobin A1c   Result Value Ref Range    Hemoglobin A1C 8.10 (H) 4.80 - 5.60 %   Vitamin D 25 Hydroxy   Result Value Ref Range    25 Hydroxy, Vitamin D 34.8 30.0 - 100.0 ng/ml   Lipid Panel   Result Value Ref Range    Total Cholesterol 139 0 - 200 mg/dL    Triglycerides 98 0 - 150 mg/dL    HDL Cholesterol 45 40 - 60 mg/dL    VLDL Cholesterol 19.6 mg/dL    LDL Cholesterol  74 0 - 100 mg/dL   Comprehensive Metabolic Panel   Result Value Ref Range    Glucose 191 (H) 65 - 99 mg/dL    BUN 15 8 - 23 mg/dL    Creatinine 0.71 0.57 - 1.00 mg/dL    eGFR Non African Am 83 >60 mL/min/1.73    eGFR African Am 100 >60 mL/min/1.73    BUN/Creatinine Ratio 21.1 7.0 - 25.0    Sodium 140 136 - 145 mmol/L    Potassium 4.3 3.5 - 5.2 mmol/L    Chloride 103 98 - 107 mmol/L    Total CO2 24.0 22.0 - 29.0 mmol/L    Calcium 9.2 8.6 - 10.5 mg/dL    Total Protein 6.5 6.0 - 8.5 g/dL    Albumin 4.60 3.50 - 5.20 g/dL    Globulin 1.9 gm/dL    A/G Ratio 2.4 g/dL    Total Bilirubin 0.9 0.2 - 1.2 mg/dL    Alkaline Phosphatase 62 39 - 117 U/L    AST (SGOT) 15 1 - 32 U/L    ALT (SGPT) 19 1 - 33 U/L   Cardiovascular Risk Assessment   Result Value Ref Range    Interpretation Note    Diabetes Patient Education   Result Value Ref Range    PDF Image Not applicable            Assessment/Plan   Magui was seen today for hyperlipidemia, hypertension, diabetes and vitamin d deficiency.    Diagnoses and all orders for this visit:    Type 2 diabetes mellitus without complication, with long-term current use of insulin (CMS/HCC)    Benign essential hypertension    Dyslipidemia    Hypothyroidism due to Hashimoto's  thyroiditis    Vitamin D deficiency    In summary patient was seen and examined.  She appears clinically and metabolically stable.  Last A1c has increased from 7.20 in December to 8.1 today.  She will increase her Toujeo to 20 units nightly.  She will continue all other medications.  Vitamin D is satisfactory.  Blood pressure is satisfactory.  Lipid panel is satisfactory.  Thyroid hormones are satisfactory.  She will follow-up in 6 months with labs prior.  He is encouraged to call the office with any questions or concerns or if blood sugars drop to 70 or below.  She is also been advised to contact the office of her morning blood sugars fail to improve.    Increase Toujeo to 20 units   Call the office if any low sugars 70 or below   Continue all other current medications

## 2019-08-29 RX ORDER — LINAGLIPTIN AND METFORMIN HYDROCHLORIDE 2.5; 85 MG/1; MG/1
TABLET, FILM COATED ORAL
Qty: 90 TABLET | Refills: 4 | Status: CANCELLED | OUTPATIENT
Start: 2019-08-29

## 2019-09-10 DIAGNOSIS — E78.5 DYSLIPIDEMIA: ICD-10-CM

## 2019-09-10 DIAGNOSIS — E03.9 HYPOTHYROIDISM, UNSPECIFIED TYPE: ICD-10-CM

## 2019-09-10 DIAGNOSIS — E55.9 VITAMIN D DEFICIENCY: ICD-10-CM

## 2019-09-10 DIAGNOSIS — E11.9 CONTROLLED TYPE 2 DIABETES MELLITUS WITHOUT COMPLICATION (HCC): ICD-10-CM

## 2019-09-10 DIAGNOSIS — I10 BENIGN ESSENTIAL HYPERTENSION: ICD-10-CM

## 2019-09-10 RX ORDER — LEVOTHYROXINE SODIUM 112 UG/1
TABLET ORAL
Qty: 90 TABLET | Refills: 1 | Status: SHIPPED | OUTPATIENT
Start: 2019-09-10 | End: 2020-02-24 | Stop reason: SDUPTHER

## 2019-09-11 RX ORDER — INSULIN GLARGINE 300 U/ML
10 INJECTION, SOLUTION SUBCUTANEOUS DAILY
Qty: 4.5 ML | Refills: 2 | Status: SHIPPED | OUTPATIENT
Start: 2019-09-11 | End: 2019-09-12 | Stop reason: DRUGHIGH

## 2019-09-12 ENCOUNTER — OFFICE VISIT (OUTPATIENT)
Dept: INTERNAL MEDICINE | Age: 64
End: 2019-09-12

## 2019-09-12 VITALS
WEIGHT: 154.8 LBS | HEART RATE: 102 BPM | OXYGEN SATURATION: 97 % | HEIGHT: 67 IN | DIASTOLIC BLOOD PRESSURE: 72 MMHG | TEMPERATURE: 97.3 F | SYSTOLIC BLOOD PRESSURE: 114 MMHG | BODY MASS INDEX: 24.3 KG/M2

## 2019-09-12 DIAGNOSIS — E06.3 HYPOTHYROIDISM DUE TO HASHIMOTO'S THYROIDITIS: Chronic | ICD-10-CM

## 2019-09-12 DIAGNOSIS — F32.A DEPRESSION, UNSPECIFIED DEPRESSION TYPE: Chronic | ICD-10-CM

## 2019-09-12 DIAGNOSIS — E11.65 TYPE 2 DIABETES MELLITUS WITH HYPERGLYCEMIA, WITH LONG-TERM CURRENT USE OF INSULIN (HCC): Primary | Chronic | ICD-10-CM

## 2019-09-12 DIAGNOSIS — E03.8 HYPOTHYROIDISM DUE TO HASHIMOTO'S THYROIDITIS: Chronic | ICD-10-CM

## 2019-09-12 DIAGNOSIS — E78.5 DYSLIPIDEMIA: Chronic | ICD-10-CM

## 2019-09-12 DIAGNOSIS — Z79.4 TYPE 2 DIABETES MELLITUS WITH HYPERGLYCEMIA, WITH LONG-TERM CURRENT USE OF INSULIN (HCC): Primary | Chronic | ICD-10-CM

## 2019-09-12 DIAGNOSIS — Z13.6 SCREENING FOR ISCHEMIC HEART DISEASE: ICD-10-CM

## 2019-09-12 DIAGNOSIS — I10 BENIGN ESSENTIAL HYPERTENSION: Chronic | ICD-10-CM

## 2019-09-12 PROCEDURE — 99214 OFFICE O/P EST MOD 30 MIN: CPT | Performed by: INTERNAL MEDICINE

## 2019-09-12 NOTE — PROGRESS NOTES
[facility] [place] INTERNAL MEDICINE  ANTONIA ZUNIGA M.D.      Magui Dumont / 63 y.o. / female  09/12/2019      CHIEF COMPLAINT     Hypertension (6 mo f/u); Diabetes; Hypothyroidism; and Dyslipidemia      ASSESSMENT & PLAN     Problem List Items Addressed This Visit        High    Type 2 diabetes mellitus with hyperglycemia, with long-term current use of insulin (CMS/[facility]) - Primary (Chronic)    Overview     *Managed by endocrinologist ([place])         Current Assessment & Plan     Discussed dietary modifications including making dinner a lighter meal.   Continue as endocrinologist. Noted increase in Tuojeo to 20 units.          Relevant Medications    JARDIANCE 25 MG tablet    glimepiride (AMARYL) 4 MG tablet    lisinopril-hydrochlorothiazide (PRINZIDE,ZESTORETIC) 20-12.5 MG per tablet    vitamin D (ERGOCALCIFEROL) 02017 units capsule capsule    TOUJEO SOLOSTAR 300 UNIT/ML solution pen-injector    linaGLIPtin-metFORMIN HCl (JENTADUETO) 2.5-850 MG tablet    levothyroxine (SYNTHROID, LEVOTHROID) 112 MCG tablet       Medium    Benign essential hypertension (Chronic)    Overview     Continue lisinopril-hctz 20/12.5 mg qd.          Relevant Medications    lisinopril-hydrochlorothiazide (PRINZIDE,ZESTORETIC) 20-12.5 MG per tablet    vitamin D (ERGOCALCIFEROL) 12992 units capsule capsule    levothyroxine (SYNTHROID, LEVOTHROID) 112 MCG tablet    Depression (Chronic)    Overview     Continue citalopram 20 mg qd.          Relevant Medications    citalopram (CeleXA) 20 MG tablet    Dyslipidemia (Chronic)    Overview     *Managed by endocrinologist.         Current Assessment & Plan     Improved with medication compliance.     Lab Results   Component Value Date    LDL 74 06/24/2019     (H) 12/20/2018     (H) 06/01/2018    HDL 45 06/24/2019    HDL 57 12/20/2018    HDL 52 06/01/2018    TRIG 98 06/24/2019             Relevant Medications    lisinopril-hydrochlorothiazide (PRINZIDE,ZESTORETIC) 20-12.5 MG per tablet    vitamin D  (ERGOCALCIFEROL) 44665 units capsule capsule    levothyroxine (SYNTHROID, LEVOTHROID) 112 MCG tablet       Low    Hypothyroidism (Chronic)    Overview     *Managed by endocrinology    Stable. Continue current dose of levothyroxine at 112 mcg.           Relevant Medications    vitamin D (ERGOCALCIFEROL) 19672 units capsule capsule    levothyroxine (SYNTHROID, LEVOTHROID) 112 MCG tablet      Other Visit Diagnoses     Screening for ischemic heart disease        Relevant Orders    CT Cardiac Calcium Score Without Dye        Orders Placed This Encounter   Procedures   • CT Cardiac Calcium Score Without Dye     No orders of the defined types were placed in this encounter.      Summary/Discussion:  · Triple vessel screening recommended.   · Cardiac CT calcium scoring test recommended.        Next Appointment with me: Visit date not found    Return in about 6 months (around 3/12/2020) for Diabetes and co-morbid conditions.      MEDICATIONS     Current Outpatient Medications   Medication Sig Dispense Refill   • citalopram (CeleXA) 20 MG tablet TAKE 1 TABLET BY MOUTH ONCE DAILY 90 tablet 0   • glimepiride (AMARYL) 4 MG tablet TAKE 1 TABLET BY MOUTH ONCE DAILY EVERY MORNING BEFORE BREAKFAST 30 tablet 5   • glucose blood test strip OneTouch Verio In Vitro Strip; Patient Sig: CHECK BLOOD GLUCOSE 1 TIME A  each 1   • Insulin Pen Needle (RELION PEN NEEDLES) 32G X 4 MM misc USE 1 PEN NEEDLE ONCE DAILY 100 each 0   • JARDIANCE 25 MG tablet Take 1 tablet by mouth Daily. 30 tablet 5   • levothyroxine (SYNTHROID, LEVOTHROID) 112 MCG tablet TAKE 1 TABLET BY MOUTH ONCE DAILY 90 tablet 1   • linaGLIPtin-metFORMIN HCl (JENTADUETO) 2.5-850 MG tablet Take 1 tablet by mouth Daily. 90 tablet 0   • lisinopril-hydrochlorothiazide (PRINZIDE,ZESTORETIC) 20-12.5 MG per tablet TAKE 1 TABLET BY MOUTH ONCE DAILY 90 tablet 0   • Omega-3 Fatty Acids (FISH OIL) 1000 MG capsule delayed-release Take  by mouth daily.     • ONETOUCH DELICA LANCETS  "FINE misc      • rosuvastatin (CRESTOR) 40 MG tablet TAKE ONE TABLET BY MOUTH ONCE DAILY 30 tablet 9   • TOUJEO SOLOSTAR 300 UNIT/ML solution pen-injector Inject 20 Units under the skin into the appropriate area as directed Daily. 3 pen 5   • vitamin D (ERGOCALCIFEROL) 48675 units capsule capsule TAKE 1 CAPSULE BY MOUTH ONCE A WEEK 4 capsule 12     No current facility-administered medications for this visit.           VITAL SIGNS     Visit Vitals  /72   Pulse 102   Temp 97.3 °F (36.3 °C)   Ht 170.2 cm (67\")   Wt 70.2 kg (154 lb 12.8 oz)   SpO2 97%   BMI 24.25 kg/m²       BP Readings from Last 3 Encounters:   09/12/19 114/72   07/11/19 126/76   03/14/19 122/74     Wt Readings from Last 3 Encounters:   09/12/19 70.2 kg (154 lb 12.8 oz)   07/11/19 68.9 kg (152 lb)   03/14/19 70.8 kg (156 lb)      Body mass index is 24.25 kg/m².      HISTORY OF PRESENT ILLNESS     Reviewed chief complaint and details of complaint as documented by staff.     Depression:  overall well controlled and without significant problems or side effects. Current treatment include citalopram.      DM 2 : needed to increase Tuojeo to 20 units due to worsening A1c.   Lab Results   Component Value Date    HGBA1C 8.10 (H) 06/24/2019    HGBA1C 7.20 (H) 12/20/2018    HGBA1C 6.33 (H) 06/01/2018   Dinner tends to be the heaviest meal of day.     Chronic essential hypertension:  Since prior visit: compliant with medication(s) and denies significant problems with medication(s).  Most recent in-office blood pressure readings:   BP Readings from Last 3 Encounters:   09/12/19 114/72   07/11/19 126/76   03/14/19 122/74     Chronic hyperlipidemia:  Current therapy include rosuvastatin, denies problems with medication.    Most recent labs:   Lab Results   Component Value Date    LDL 74 06/24/2019     (H) 12/20/2018     (H) 06/01/2018    HDL 45 06/24/2019    HDL 57 12/20/2018    HDL 52 06/01/2018    TRIG 98 06/24/2019     Chronic hypothyroidism:  " On stable dose of levothyroxine, no changes in physical symptoms  Lab Results   Component Value Date    TSH 0.997 06/24/2019    TSH 1.970 12/20/2018    FREET4 1.66 06/24/2019    FREET4 1.27 12/20/2018          Patient Care Team:  Venkat Brown MD as PCP - General (Internal Medicine)  Harsh Velasco MD as Consulting Physician (Obstetrics and Gynecology)      REVIEW OF SYSTEMS     Review of Systems  Constitutional neg  Resp neg  CV neg  Psych stable mood      PHYSICAL EXAMINATION     Physical Exam  Constitutional  No distress  Cardiovascular Rate  normal . Rhythm: regular . Heart sounds:  Normal  Pulmonary/Chest: Effort normal and breath sounds normal.    Psychiatric  Alert. Judgment and thought content normal. Mood normal      REVIEWED DATA     Labs:     Lab Results   Component Value Date     06/24/2019    K 4.3 06/24/2019    CALCIUM 9.2 06/24/2019    AST 15 06/24/2019    ALT 19 06/24/2019    BUN 15 06/24/2019    CREATININE 0.71 06/24/2019    CREATININE 0.74 12/20/2018    CREATININE 0.80 06/01/2018    EGFRIFNONA 83 06/24/2019    EGFRIFAFRI 100 06/24/2019       Lab Results   Component Value Date    HGBA1C 8.10 (H) 06/24/2019    HGBA1C 7.20 (H) 12/20/2018    HGBA1C 6.33 (H) 06/01/2018     (H) 06/24/2019     (H) 12/20/2018     (H) 06/01/2018    MICROALBUR 11.2 12/20/2018       Lab Results   Component Value Date    LDL 74 06/24/2019     (H) 12/20/2018     (H) 06/01/2018    HDL 45 06/24/2019    HDL 57 12/20/2018    HDL 52 06/01/2018    TRIG 98 06/24/2019    TRIG 152 (H) 12/20/2018    TRIG 105 06/01/2018       Lab Results   Component Value Date    TSH 0.997 06/24/2019    FREET4 1.66 06/24/2019       Lab Results   Component Value Date    WBC 6.46 12/14/2017    HGB 14.1 12/14/2017     12/14/2017       No results found for: PROTEIN, GLUCOSEU, BLOODU, NITRITEU, LEUKOCYTESUR    Imaging:         Medical Tests:         Summary of old records / correspondence / consultant  report:         Request outside records:         ALLERGIES  No Known Allergies     PFSH:     The following portions of the patient's history were reviewed and updated as appropriate: Allergies / Current Medications / Past Medical History / Surgical History / Social History / Family History    PROBLEM LIST   Patient Active Problem List   Diagnosis   • Benign essential hypertension   • Depression   • Type 2 diabetes mellitus with hyperglycemia, with long-term current use of insulin (CMS/HCC)   • Dyslipidemia   • Hypothyroidism   • Vitamin D deficiency       PAST MEDICAL HISTORY  Past Medical History:   Diagnosis Date   • Hyperlipidemia    • Hypertension    • Hypothyroidism    • Type 2 diabetes mellitus (CMS/HCC)    • Vitamin D deficiency        SURGICAL HISTORY  Past Surgical History:   Procedure Laterality Date   • CHOLECYSTECTOMY     • COLONOSCOPY N/A 9/15/2017    Procedure: COLONOSCOPY;  Surgeon: Brian Puckett MD;  Location: [facility] ENDOSCOPY;  Service:    • EYE SURGERY Left     cataract    • TONSILLECTOMY         SOCIAL HISTORY  Social History     Socioeconomic History   • Marital status:      Spouse name: Dallas*   • Number of children: 2   • Years of education: Not on file   • Highest education level: Not on file   Occupational History   • Occupation: Part-time /    Tobacco Use   • Smoking status: Never Smoker   • Smokeless tobacco: Never Used   Substance and Sexual Activity   • Alcohol use: No   • Drug use: No   • Sexual activity: Defer   Lifestyle   • Physical activity:     Days per week: 3 days     Minutes per session: Not on file   • Stress: Not on file       FAMILY HISTORY  Family History   Problem Relation Age of Onset   • Lung cancer Mother         smoker   • Cancer Brother         cholangiocarcinoma ( 54)   • Diabetes type II Brother    • Lung cancer Maternal Aunt         Adenocarcinoma (6 sisters)   • Diabetes type II Father          at 82   • Coronary artery  disease Father    • No Known Problems Sister    • No Known Problems Sister          **Varun Disclaimer:   Much of this encounter note is an electronic transcription/translation of spoken language to printed text. The electronic translation of spoken language may permit erroneous, or at times, nonsensical words or phrases to be inadvertently transcribed. Although I have reviewed the note for such errors, some may still exist.       Template created by Bear Brown MD

## 2019-09-12 NOTE — ASSESSMENT & PLAN NOTE
Discussed dietary modifications including making dinner a lighter meal.   Continue as endocrinologist. Noted increase in Tuojeo to 20 units.

## 2019-09-18 ENCOUNTER — HOSPITAL ENCOUNTER (OUTPATIENT)
Dept: CT IMAGING | Facility: HOSPITAL | Age: 64
Discharge: HOME | End: 2019-09-18
Admitting: INTERNAL MEDICINE

## 2019-09-18 DIAGNOSIS — Z13.6 SCREENING FOR ISCHEMIC HEART DISEASE: ICD-10-CM

## 2019-09-18 PROCEDURE — 75571 CT HRT W/O DYE W/CA TEST: CPT

## 2019-09-20 NOTE — PROGRESS NOTES
Ny:    Magui, here are the result(s) of your test(s):     Coronary artery calcium scoring test shows MINIMAL atherosclerotic burden. This is a good sign.   Continue current preventative strategy.     Please do not hesitate to contact me if you have questions.

## 2019-10-08 RX ORDER — GLIMEPIRIDE 4 MG/1
TABLET ORAL
Qty: 30 TABLET | Refills: 5 | Status: SHIPPED | OUTPATIENT
Start: 2019-10-08 | End: 2020-02-24

## 2019-11-04 DIAGNOSIS — E55.9 VITAMIN D DEFICIENCY: ICD-10-CM

## 2019-11-04 DIAGNOSIS — E11.9 CONTROLLED TYPE 2 DIABETES MELLITUS WITHOUT COMPLICATION (HCC): ICD-10-CM

## 2019-11-04 DIAGNOSIS — E78.5 DYSLIPIDEMIA: ICD-10-CM

## 2019-11-04 DIAGNOSIS — I10 BENIGN ESSENTIAL HYPERTENSION: ICD-10-CM

## 2019-11-04 RX ORDER — LISINOPRIL AND HYDROCHLOROTHIAZIDE 20; 12.5 MG/1; MG/1
TABLET ORAL
Qty: 90 TABLET | Refills: 0 | Status: SHIPPED | OUTPATIENT
Start: 2019-11-04 | End: 2020-05-12

## 2019-12-13 ENCOUNTER — APPOINTMENT (OUTPATIENT)
Dept: WOMENS IMAGING | Facility: HOSPITAL | Age: 64
End: 2019-12-13

## 2019-12-13 PROCEDURE — 77067 SCR MAMMO BI INCL CAD: CPT | Performed by: RADIOLOGY

## 2019-12-13 PROCEDURE — 77063 BREAST TOMOSYNTHESIS BI: CPT | Performed by: RADIOLOGY

## 2020-01-06 RX ORDER — CITALOPRAM 20 MG/1
TABLET ORAL
Qty: 90 TABLET | Refills: 0 | Status: SHIPPED | OUTPATIENT
Start: 2020-01-06 | End: 2020-07-10

## 2020-02-11 LAB
25(OH)D3+25(OH)D2 SERPL-MCNC: 40.4 NG/ML (ref 30–100)
ALBUMIN SERPL-MCNC: 4.8 G/DL (ref 3.5–5.2)
ALBUMIN/GLOB SERPL: 2.3 G/DL
ALP SERPL-CCNC: 96 U/L (ref 39–117)
ALT SERPL-CCNC: 43 U/L (ref 1–33)
AST SERPL-CCNC: 24 U/L (ref 1–32)
BILIRUB SERPL-MCNC: 1.3 MG/DL (ref 0.2–1.2)
BUN SERPL-MCNC: 15 MG/DL (ref 8–23)
BUN/CREAT SERPL: 20.5 (ref 7–25)
C PEPTIDE SERPL-MCNC: 3.2 NG/ML (ref 1.1–4.4)
CALCIUM SERPL-MCNC: 9.5 MG/DL (ref 8.6–10.5)
CHLORIDE SERPL-SCNC: 96 MMOL/L (ref 98–107)
CHOLEST SERPL-MCNC: 161 MG/DL (ref 0–200)
CO2 SERPL-SCNC: 25.1 MMOL/L (ref 22–29)
CREAT SERPL-MCNC: 0.73 MG/DL (ref 0.57–1)
GLOBULIN SER CALC-MCNC: 2.1 GM/DL
GLUCOSE SERPL-MCNC: 254 MG/DL (ref 65–99)
HBA1C MFR BLD: 10.5 % (ref 4.8–5.6)
HDLC SERPL-MCNC: 45 MG/DL (ref 40–60)
INTERPRETATION: NORMAL
LDLC SERPL CALC-MCNC: 91 MG/DL (ref 0–100)
Lab: NORMAL
POTASSIUM SERPL-SCNC: 4.1 MMOL/L (ref 3.5–5.2)
PROT SERPL-MCNC: 6.9 G/DL (ref 6–8.5)
SODIUM SERPL-SCNC: 136 MMOL/L (ref 136–145)
T3FREE SERPL-MCNC: 2.9 PG/ML (ref 2–4.4)
T4 FREE SERPL-MCNC: 1.83 NG/DL (ref 0.93–1.7)
T4 SERPL-MCNC: 11.6 MCG/DL (ref 4.5–11.7)
TRIGL SERPL-MCNC: 127 MG/DL (ref 0–150)
TSH SERPL DL<=0.005 MIU/L-ACNC: 0.69 UIU/ML (ref 0.27–4.2)
URATE SERPL-MCNC: 3.6 MG/DL (ref 2.4–5.7)
VLDLC SERPL CALC-MCNC: 25.4 MG/DL

## 2020-02-24 ENCOUNTER — OFFICE VISIT (OUTPATIENT)
Dept: ENDOCRINOLOGY | Age: 65
End: 2020-02-24

## 2020-02-24 VITALS
SYSTOLIC BLOOD PRESSURE: 140 MMHG | RESPIRATION RATE: 16 BRPM | DIASTOLIC BLOOD PRESSURE: 70 MMHG | WEIGHT: 156.6 LBS | BODY MASS INDEX: 24.58 KG/M2 | HEIGHT: 67 IN

## 2020-02-24 DIAGNOSIS — E03.9 HYPOTHYROIDISM, UNSPECIFIED TYPE: ICD-10-CM

## 2020-02-24 DIAGNOSIS — I10 BENIGN ESSENTIAL HYPERTENSION: Chronic | ICD-10-CM

## 2020-02-24 DIAGNOSIS — E55.9 VITAMIN D DEFICIENCY: ICD-10-CM

## 2020-02-24 DIAGNOSIS — E78.5 DYSLIPIDEMIA: Chronic | ICD-10-CM

## 2020-02-24 DIAGNOSIS — E11.65 TYPE 2 DIABETES MELLITUS WITH HYPERGLYCEMIA, WITH LONG-TERM CURRENT USE OF INSULIN (HCC): Primary | Chronic | ICD-10-CM

## 2020-02-24 DIAGNOSIS — E11.9 CONTROLLED TYPE 2 DIABETES MELLITUS WITHOUT COMPLICATION (HCC): ICD-10-CM

## 2020-02-24 DIAGNOSIS — Z79.4 TYPE 2 DIABETES MELLITUS WITH HYPERGLYCEMIA, WITH LONG-TERM CURRENT USE OF INSULIN (HCC): Primary | Chronic | ICD-10-CM

## 2020-02-24 PROCEDURE — 99214 OFFICE O/P EST MOD 30 MIN: CPT | Performed by: INTERNAL MEDICINE

## 2020-02-24 RX ORDER — LEVOTHYROXINE SODIUM 112 UG/1
112 TABLET ORAL DAILY
Qty: 90 TABLET | Refills: 3 | Status: SHIPPED | OUTPATIENT
Start: 2020-02-24 | End: 2020-02-24 | Stop reason: SDUPTHER

## 2020-02-24 RX ORDER — INSULIN GLARGINE AND LIXISENATIDE 100; 33 U/ML; UG/ML
60 INJECTION, SOLUTION SUBCUTANEOUS
Qty: 15 PEN | Refills: 3 | Status: SHIPPED | OUTPATIENT
Start: 2020-02-24 | End: 2020-03-31 | Stop reason: SDUPTHER

## 2020-02-24 RX ORDER — LEVOTHYROXINE SODIUM 112 UG/1
112 TABLET ORAL DAILY
Qty: 90 TABLET | Refills: 3 | Status: SHIPPED | OUTPATIENT
Start: 2020-02-24 | End: 2021-04-14

## 2020-02-24 RX ORDER — ROSUVASTATIN CALCIUM 40 MG/1
40 TABLET, COATED ORAL DAILY
Qty: 90 TABLET | Refills: 3 | Status: SHIPPED | OUTPATIENT
Start: 2020-02-24 | End: 2023-02-09 | Stop reason: SDUPTHER

## 2020-02-24 RX ORDER — ROSUVASTATIN CALCIUM 40 MG/1
40 TABLET, COATED ORAL DAILY
Qty: 90 TABLET | Refills: 3 | Status: SHIPPED | OUTPATIENT
Start: 2020-02-24 | End: 2020-02-24 | Stop reason: SDUPTHER

## 2020-02-24 RX ORDER — ERGOCALCIFEROL 1.25 MG/1
50000 CAPSULE ORAL WEEKLY
Qty: 13 CAPSULE | Refills: 3 | Status: SHIPPED | OUTPATIENT
Start: 2020-02-24 | End: 2020-02-24 | Stop reason: SDUPTHER

## 2020-02-24 RX ORDER — ERGOCALCIFEROL 1.25 MG/1
50000 CAPSULE ORAL WEEKLY
Qty: 13 CAPSULE | Refills: 3 | Status: SHIPPED | OUTPATIENT
Start: 2020-02-24 | End: 2021-08-11 | Stop reason: SDUPTHER

## 2020-02-24 NOTE — PROGRESS NOTES
"Subjective   Magui Dumont is a 64 y.o. female seen for follow up for DM2, hyperlipidemia, HTN, hypothyroidism, vit d deficiency, lab review. Patient states that she stopped Jardiance due to redness and burning. She stopped Jentadueto due to cost. She is checking BG 3 times a week. No BG readings.     History of Present Illness this is a 64-year-old female known patient with type 2 diabetes hypothyroidism and hypertension and dyslipidemia as well as vitamin D deficiency.  Over the course of last 6 months she has had no significant health problem for which to go to the emergency room or hospital.  However she is no longer on either Jentadueto due to cost and Jardiance due to urogenital side effects.    /70   Resp 16   Ht 170.2 cm (67\")   Wt 71 kg (156 lb 9.6 oz)   BMI 24.53 kg/m²      No Known Allergies    Current Outpatient Medications:   •  citalopram (CeleXA) 20 MG tablet, TAKE 1 TABLET BY MOUTH ONCE DAILY, Disp: 90 tablet, Rfl: 0  •  glimepiride (AMARYL) 4 MG tablet, TAKE 1 TABLET BY MOUTH ONCE DAILY EVERY MORNING BEFORE BREAKFAST, Disp: 30 tablet, Rfl: 5  •  glucose blood test strip, OneTouch Verio In Vitro Strip; Patient Sig: CHECK BLOOD GLUCOSE 1 TIME A DAY, Disp: 100 each, Rfl: 1  •  Insulin Pen Needle (RELION PEN NEEDLES) 32G X 4 MM misc, USE 1 PEN NEEDLE ONCE DAILY, Disp: 100 each, Rfl: 0  •  levothyroxine (SYNTHROID, LEVOTHROID) 112 MCG tablet, TAKE 1 TABLET BY MOUTH ONCE DAILY, Disp: 90 tablet, Rfl: 1  •  lisinopril-hydrochlorothiazide (PRINZIDE,ZESTORETIC) 20-12.5 MG per tablet, TAKE 1 TABLET BY MOUTH ONCE DAILY, Disp: 90 tablet, Rfl: 0  •  Omega-3 Fatty Acids (FISH OIL) 1000 MG capsule delayed-release, Take  by mouth daily., Disp: , Rfl:   •  ONETOUCH DELICA LANCETS FINE misc, , Disp: , Rfl:   •  rosuvastatin (CRESTOR) 40 MG tablet, TAKE ONE TABLET BY MOUTH ONCE DAILY, Disp: 30 tablet, Rfl: 9  •  TOUJEO SOLOSTAR 300 UNIT/ML solution pen-injector, Inject 20 Units under the skin into the " appropriate area as directed Daily., Disp: 3 pen, Rfl: 5  •  vitamin D (ERGOCALCIFEROL) 99964 units capsule capsule, TAKE 1 CAPSULE BY MOUTH ONCE A WEEK, Disp: 4 capsule, Rfl: 12      The following portions of the patient's history were reviewed and updated as appropriate: allergies, current medications, past family history, past medical history, past social history, past surgical history and problem list.    Review of Systems   Constitutional: Negative.    HENT: Negative.    Eyes: Negative.    Respiratory: Negative.    Cardiovascular: Negative.    Gastrointestinal: Negative.    Endocrine: Negative.    Genitourinary: Negative.    Musculoskeletal: Negative.    Skin: Negative.    Allergic/Immunologic: Negative.    Neurological: Negative.    Hematological: Negative.    Psychiatric/Behavioral: Negative.    The above review of system was reviewed, corroborated and accepted.    Objective   Physical Exam   Constitutional: She is oriented to person, place, and time. She appears well-developed and well-nourished. No distress.   HENT:   Head: Normocephalic and atraumatic.   Right Ear: External ear normal.   Left Ear: External ear normal.   Nose: Nose normal.   Mouth/Throat: Oropharynx is clear and moist. No oropharyngeal exudate.   Eyes: Pupils are equal, round, and reactive to light. EOM are normal. Right eye exhibits no discharge. Left eye exhibits no discharge. No scleral icterus.   Neck: Trachea normal, normal range of motion and full passive range of motion without pain. Neck supple. No JVD present. No tracheal tenderness present. Carotid bruit is not present. No tracheal deviation, no edema and no erythema present. No thyroid mass and no thyromegaly present.   Cardiovascular: Normal rate, regular rhythm, normal heart sounds and intact distal pulses. Exam reveals no gallop and no friction rub.   No murmur heard.  Pulmonary/Chest: Effort normal and breath sounds normal. No stridor. No respiratory distress. She has no  wheezes. She has no rales. She exhibits no tenderness.   Abdominal: Soft. Bowel sounds are normal. She exhibits no distension and no mass. There is no tenderness. There is no rebound and no guarding. No hernia.   Musculoskeletal: Normal range of motion. She exhibits no edema, tenderness or deformity.   Lymphadenopathy:     She has no cervical adenopathy.   Neurological: She is alert and oriented to person, place, and time. She displays normal reflexes. No cranial nerve deficit or sensory deficit. She exhibits normal muscle tone. Coordination normal.   Skin: Skin is warm and dry. No rash noted. She is not diaphoretic. No erythema. No pallor.   Psychiatric: She has a normal mood and affect. Her behavior is normal. Judgment and thought content normal.   Nursing note and vitals reviewed.  No significant change since 6/22/2018 office visit.    Results for orders placed or performed in visit on 02/10/20   Comprehensive Metabolic Panel   Result Value Ref Range    Glucose 254 (H) 65 - 99 mg/dL    BUN 15 8 - 23 mg/dL    Creatinine 0.73 0.57 - 1.00 mg/dL    eGFR Non African Am 80 >60 mL/min/1.73    eGFR African Am 97 >60 mL/min/1.73    BUN/Creatinine Ratio 20.5 7.0 - 25.0    Sodium 136 136 - 145 mmol/L    Potassium 4.1 3.5 - 5.2 mmol/L    Chloride 96 (L) 98 - 107 mmol/L    Total CO2 25.1 22.0 - 29.0 mmol/L    Calcium 9.5 8.6 - 10.5 mg/dL    Total Protein 6.9 6.0 - 8.5 g/dL    Albumin 4.80 3.50 - 5.20 g/dL    Globulin 2.1 gm/dL    A/G Ratio 2.3 g/dL    Total Bilirubin 1.3 (H) 0.2 - 1.2 mg/dL    Alkaline Phosphatase 96 39 - 117 U/L    AST (SGOT) 24 1 - 32 U/L    ALT (SGPT) 43 (H) 1 - 33 U/L   Lipid Panel   Result Value Ref Range    Total Cholesterol 161 0 - 200 mg/dL    Triglycerides 127 0 - 150 mg/dL    HDL Cholesterol 45 40 - 60 mg/dL    VLDL Cholesterol 25.4 mg/dL    LDL Cholesterol  91 0 - 100 mg/dL   T4 & TSH ([facility])   Result Value Ref Range    TSH 0.690 0.270 - 4.200 uIU/mL    T4, Total 11.60 4.50 - 11.70 mcg/dL    Hemoglobin A1c   Result Value Ref Range    Hemoglobin A1C 10.50 (H) 4.80 - 5.60 %   T4, Free   Result Value Ref Range    Free T4 1.83 (H) 0.93 - 1.70 ng/dL   C-Peptide   Result Value Ref Range    C-Peptide 3.2 1.1 - 4.4 ng/mL   Vitamin D 25 Hydroxy   Result Value Ref Range    25 Hydroxy, Vitamin D 40.4 30.0 - 100.0 ng/ml   Uric Acid   Result Value Ref Range    Uric Acid 3.6 2.4 - 5.7 mg/dL   T3, Free   Result Value Ref Range    T3, Free 2.9 2.0 - 4.4 pg/mL   Cardiovascular Risk Assessment   Result Value Ref Range    Interpretation Note    Diabetes Patient Education   Result Value Ref Range    PDF Image Not applicable          Assessment/Plan   Magui was seen today for diabetes.    Diagnoses and all orders for this visit:    Type 2 diabetes mellitus with hyperglycemia, with long-term current use of insulin (CMS/[facility])  -     T3, Free; Future  -     T4 & TSH ([facility]); Future  -     T4, Free; Future  -     Uric Acid; Future  -     Vitamin D 25 Hydroxy; Future  -     Comprehensive Metabolic Panel; Future  -     C-Peptide; Future  -     Hemoglobin A1c; Future  -     Lipid Panel; Future  -     MicroAlbumin, Urine, Random - Urine, Clean Catch; Future    Benign essential hypertension  -     Discontinue: levothyroxine (SYNTHROID, LEVOTHROID) 112 MCG tablet; Take 1 tablet by mouth Daily.  -     Discontinue: vitamin D (ERGOCALCIFEROL) 1.25 MG (27487 UT) capsule capsule; Take 1 capsule by mouth 1 (One) Time Per Week.  -     levothyroxine (SYNTHROID, LEVOTHROID) 112 MCG tablet; Take 1 tablet by mouth Daily.  -     vitamin D (ERGOCALCIFEROL) 1.25 MG (85426 UT) capsule capsule; Take 1 capsule by mouth 1 (One) Time Per Week.  -     T3, Free; Future  -     T4 & TSH ([facility]); Future  -     T4, Free; Future  -     Uric Acid; Future  -     Vitamin D 25 Hydroxy; Future  -     Comprehensive Metabolic Panel; Future  -     C-Peptide; Future  -     Hemoglobin A1c; Future  -     Lipid Panel; Future  -     MicroAlbumin, Urine, Random -  Urine, Clean Catch; Future    Vitamin D deficiency  -     Discontinue: levothyroxine (SYNTHROID, LEVOTHROID) 112 MCG tablet; Take 1 tablet by mouth Daily.  -     Discontinue: vitamin D (ERGOCALCIFEROL) 1.25 MG (25229 UT) capsule capsule; Take 1 capsule by mouth 1 (One) Time Per Week.  -     levothyroxine (SYNTHROID, LEVOTHROID) 112 MCG tablet; Take 1 tablet by mouth Daily.  -     vitamin D (ERGOCALCIFEROL) 1.25 MG (75599 UT) capsule capsule; Take 1 capsule by mouth 1 (One) Time Per Week.  -     T3, Free; Future  -     T4 & TSH ([facility]); Future  -     T4, Free; Future  -     Uric Acid; Future  -     Vitamin D 25 Hydroxy; Future  -     Comprehensive Metabolic Panel; Future  -     C-Peptide; Future  -     Hemoglobin A1c; Future  -     Lipid Panel; Future  -     MicroAlbumin, Urine, Random - Urine, Clean Catch; Future    Dyslipidemia  -     Discontinue: levothyroxine (SYNTHROID, LEVOTHROID) 112 MCG tablet; Take 1 tablet by mouth Daily.  -     Discontinue: vitamin D (ERGOCALCIFEROL) 1.25 MG (30737 UT) capsule capsule; Take 1 capsule by mouth 1 (One) Time Per Week.  -     levothyroxine (SYNTHROID, LEVOTHROID) 112 MCG tablet; Take 1 tablet by mouth Daily.  -     vitamin D (ERGOCALCIFEROL) 1.25 MG (77282 UT) capsule capsule; Take 1 capsule by mouth 1 (One) Time Per Week.  -     T3, Free; Future  -     T4 & TSH ([facility]); Future  -     T4, Free; Future  -     Uric Acid; Future  -     Vitamin D 25 Hydroxy; Future  -     Comprehensive Metabolic Panel; Future  -     C-Peptide; Future  -     Hemoglobin A1c; Future  -     Lipid Panel; Future  -     MicroAlbumin, Urine, Random - Urine, Clean Catch; Future    Controlled type 2 diabetes mellitus without complication (CMS/HCC)  -     Discontinue: levothyroxine (SYNTHROID, LEVOTHROID) 112 MCG tablet; Take 1 tablet by mouth Daily.  -     Discontinue: vitamin D (ERGOCALCIFEROL) 1.25 MG (28381 UT) capsule capsule; Take 1 capsule by mouth 1 (One) Time Per Week.  -     levothyroxine  (SYNTHROID, LEVOTHROID) 112 MCG tablet; Take 1 tablet by mouth Daily.  -     vitamin D (ERGOCALCIFEROL) 1.25 MG (60389 UT) capsule capsule; Take 1 capsule by mouth 1 (One) Time Per Week.  -     T3, Free; Future  -     T4 & TSH ([facility]); Future  -     T4, Free; Future  -     Uric Acid; Future  -     Vitamin D 25 Hydroxy; Future  -     Comprehensive Metabolic Panel; Future  -     C-Peptide; Future  -     Hemoglobin A1c; Future  -     Lipid Panel; Future  -     MicroAlbumin, Urine, Random - Urine, Clean Catch; Future    Hypothyroidism, unspecified type  -     Discontinue: levothyroxine (SYNTHROID, LEVOTHROID) 112 MCG tablet; Take 1 tablet by mouth Daily.  -     Discontinue: vitamin D (ERGOCALCIFEROL) 1.25 MG (65957 UT) capsule capsule; Take 1 capsule by mouth 1 (One) Time Per Week.  -     levothyroxine (SYNTHROID, LEVOTHROID) 112 MCG tablet; Take 1 tablet by mouth Daily.  -     vitamin D (ERGOCALCIFEROL) 1.25 MG (54108 UT) capsule capsule; Take 1 capsule by mouth 1 (One) Time Per Week.  -     T3, Free; Future  -     T4 & TSH ([facility]); Future  -     T4, Free; Future  -     Uric Acid; Future  -     Vitamin D 25 Hydroxy; Future  -     Comprehensive Metabolic Panel; Future  -     C-Peptide; Future  -     Hemoglobin A1c; Future  -     Lipid Panel; Future  -     MicroAlbumin, Urine, Random - Urine, Clean Catch; Future    Other orders  -     Discontinue: rosuvastatin (CRESTOR) 40 MG tablet; Take 1 tablet by mouth Daily.  -     SOLIQUA 100-33 UNT-MCG/ML solution pen-injector injection; Inject 60 Units under the skin into the appropriate area as directed Daily With Breakfast.  -     metFORMIN (GLUCOPHAGE) 1000 MG tablet; Take 1 tablet by mouth 2 (Two) Times a Day With Meals.  -     rosuvastatin (CRESTOR) 40 MG tablet; Take 1 tablet by mouth Daily.      In summary I saw and examined this 64-year-old female for above-mentioned problems.  I reviewed her laboratory evaluations of 2/10/2020 and provided her with a hard copy of it.   Aside from her hemoglobin A1c of 10.50 she is otherwise clinically and metabolically stable.  Of course the reason for raising hemoglobin A1c is pretty obvious because of stopping of Jentadueto as well as Jardiance.  At this time I am stopping her Toujeo and Amaryl and will go ahead and start her on Soliqua 20 units every morning and I instructed her to increase the dose by 2 units every 3 days if fasting blood glucose is greater than 110 up to 60 units every morning.  I am also putting her on metformin 1000 mg twice daily with breakfast and supper.  She will see Ms. Felicity Patel in 6 months or sooner if needed but laboratory evaluation prior to each office visit.

## 2020-03-31 RX ORDER — INSULIN GLARGINE AND LIXISENATIDE 100; 33 U/ML; UG/ML
60 INJECTION, SOLUTION SUBCUTANEOUS
Qty: 15 PEN | Refills: 3 | Status: SHIPPED | OUTPATIENT
Start: 2020-03-31 | End: 2020-04-01 | Stop reason: SDUPTHER

## 2020-04-01 ENCOUNTER — TELEPHONE (OUTPATIENT)
Dept: ENDOCRINOLOGY | Age: 65
End: 2020-04-01

## 2020-04-01 DIAGNOSIS — E11.65 TYPE 2 DIABETES MELLITUS WITH HYPERGLYCEMIA, WITH LONG-TERM CURRENT USE OF INSULIN (HCC): Primary | Chronic | ICD-10-CM

## 2020-04-01 DIAGNOSIS — Z79.4 TYPE 2 DIABETES MELLITUS WITH HYPERGLYCEMIA, WITH LONG-TERM CURRENT USE OF INSULIN (HCC): Primary | Chronic | ICD-10-CM

## 2020-04-01 RX ORDER — INSULIN GLARGINE AND LIXISENATIDE 100; 33 U/ML; UG/ML
INJECTION, SOLUTION SUBCUTANEOUS
Qty: 15 PEN | Refills: 3 | Status: SHIPPED | OUTPATIENT
Start: 2020-04-01 | End: 2020-10-13

## 2020-04-01 NOTE — TELEPHONE ENCOUNTER
----- Message from Drea Hess MA sent at 3/31/2020  4:01 PM EDT -----  Patient left voice mail stating that Dr Mcfarland put her on soliqua insulin  Insurance will not cover it  Will needed a PA or change to something else that not costly

## 2020-05-12 DIAGNOSIS — I10 BENIGN ESSENTIAL HYPERTENSION: ICD-10-CM

## 2020-05-12 DIAGNOSIS — E55.9 VITAMIN D DEFICIENCY: ICD-10-CM

## 2020-05-12 DIAGNOSIS — E78.5 DYSLIPIDEMIA: ICD-10-CM

## 2020-05-12 DIAGNOSIS — E11.9 CONTROLLED TYPE 2 DIABETES MELLITUS WITHOUT COMPLICATION (HCC): ICD-10-CM

## 2020-05-12 RX ORDER — LISINOPRIL AND HYDROCHLOROTHIAZIDE 20; 12.5 MG/1; MG/1
TABLET ORAL
Qty: 90 TABLET | Refills: 1 | Status: SHIPPED | OUTPATIENT
Start: 2020-05-12 | End: 2021-05-25 | Stop reason: SDUPTHER

## 2020-07-10 RX ORDER — CITALOPRAM 20 MG/1
TABLET ORAL
Qty: 90 TABLET | Refills: 0 | Status: SHIPPED | OUTPATIENT
Start: 2020-07-10 | End: 2020-10-20

## 2020-08-10 ENCOUNTER — RESULTS ENCOUNTER (OUTPATIENT)
Dept: ENDOCRINOLOGY | Age: 65
End: 2020-08-10

## 2020-08-10 DIAGNOSIS — E55.9 VITAMIN D DEFICIENCY: ICD-10-CM

## 2020-08-10 DIAGNOSIS — Z79.4 TYPE 2 DIABETES MELLITUS WITH HYPERGLYCEMIA, WITH LONG-TERM CURRENT USE OF INSULIN (HCC): Chronic | ICD-10-CM

## 2020-08-10 DIAGNOSIS — E78.5 DYSLIPIDEMIA: Chronic | ICD-10-CM

## 2020-08-10 DIAGNOSIS — I10 BENIGN ESSENTIAL HYPERTENSION: Chronic | ICD-10-CM

## 2020-08-10 DIAGNOSIS — E11.65 TYPE 2 DIABETES MELLITUS WITH HYPERGLYCEMIA, WITH LONG-TERM CURRENT USE OF INSULIN (HCC): Chronic | ICD-10-CM

## 2020-08-10 DIAGNOSIS — E03.9 HYPOTHYROIDISM, UNSPECIFIED TYPE: ICD-10-CM

## 2020-08-10 DIAGNOSIS — E11.9 CONTROLLED TYPE 2 DIABETES MELLITUS WITHOUT COMPLICATION (HCC): ICD-10-CM

## 2020-10-06 ENCOUNTER — LAB (OUTPATIENT)
Dept: ENDOCRINOLOGY | Age: 65
End: 2020-10-06

## 2020-10-06 DIAGNOSIS — Z79.4 TYPE 2 DIABETES MELLITUS WITH HYPERGLYCEMIA, WITH LONG-TERM CURRENT USE OF INSULIN (HCC): Chronic | ICD-10-CM

## 2020-10-06 DIAGNOSIS — E78.5 DYSLIPIDEMIA: Chronic | ICD-10-CM

## 2020-10-06 DIAGNOSIS — E03.8 HYPOTHYROIDISM DUE TO HASHIMOTO'S THYROIDITIS: Chronic | ICD-10-CM

## 2020-10-06 DIAGNOSIS — I10 BENIGN ESSENTIAL HYPERTENSION: Primary | Chronic | ICD-10-CM

## 2020-10-06 DIAGNOSIS — E06.3 HYPOTHYROIDISM DUE TO HASHIMOTO'S THYROIDITIS: Chronic | ICD-10-CM

## 2020-10-06 DIAGNOSIS — E55.9 VITAMIN D DEFICIENCY: ICD-10-CM

## 2020-10-06 DIAGNOSIS — E11.65 TYPE 2 DIABETES MELLITUS WITH HYPERGLYCEMIA, WITH LONG-TERM CURRENT USE OF INSULIN (HCC): Chronic | ICD-10-CM

## 2020-10-07 LAB
25(OH)D3+25(OH)D2 SERPL-MCNC: 51.3 NG/ML (ref 30–100)
ALBUMIN SERPL-MCNC: 5.1 G/DL (ref 3.5–5.2)
ALBUMIN/GLOB SERPL: 3.2 G/DL
ALP SERPL-CCNC: 93 U/L (ref 39–117)
ALT SERPL-CCNC: 32 U/L (ref 1–33)
AST SERPL-CCNC: 19 U/L (ref 1–32)
BILIRUB SERPL-MCNC: 0.9 MG/DL (ref 0–1.2)
BUN SERPL-MCNC: 16 MG/DL (ref 8–23)
BUN/CREAT SERPL: 22.9 (ref 7–25)
C PEPTIDE SERPL-MCNC: 2.7 NG/ML (ref 1.1–4.4)
CALCIUM SERPL-MCNC: 9.4 MG/DL (ref 8.6–10.5)
CHLORIDE SERPL-SCNC: 99 MMOL/L (ref 98–107)
CHOLEST SERPL-MCNC: 149 MG/DL (ref 0–200)
CO2 SERPL-SCNC: 25 MMOL/L (ref 22–29)
CREAT SERPL-MCNC: 0.7 MG/DL (ref 0.57–1)
FT4I SERPL CALC-MCNC: 2.8 (ref 1.2–4.9)
GLOBULIN SER CALC-MCNC: 1.6 GM/DL
GLUCOSE SERPL-MCNC: 233 MG/DL (ref 65–99)
HBA1C MFR BLD: 9 % (ref 4.8–5.6)
HDLC SERPL-MCNC: 50 MG/DL (ref 40–60)
INTERPRETATION: NORMAL
LDLC SERPL CALC-MCNC: 67 MG/DL (ref 0–100)
Lab: NORMAL
MICROALBUMIN UR-MCNC: 3.7 UG/ML
POTASSIUM SERPL-SCNC: 4.3 MMOL/L (ref 3.5–5.2)
PROT SERPL-MCNC: 6.7 G/DL (ref 6–8.5)
SODIUM SERPL-SCNC: 138 MMOL/L (ref 136–145)
T3FREE SERPL-MCNC: 2.9 PG/ML (ref 2–4.4)
T3RU NFR SERPL: 26 % (ref 24–39)
T4 FREE SERPL-MCNC: 1.95 NG/DL (ref 0.93–1.7)
T4 SERPL-MCNC: 10.6 UG/DL (ref 4.5–12)
TRIGL SERPL-MCNC: 158 MG/DL (ref 0–150)
TSH SERPL DL<=0.005 MIU/L-ACNC: 0.89 UIU/ML (ref 0.45–4.5)
VLDLC SERPL CALC-MCNC: 31.6 MG/DL

## 2020-10-13 ENCOUNTER — OFFICE VISIT (OUTPATIENT)
Dept: ENDOCRINOLOGY | Age: 65
End: 2020-10-13

## 2020-10-13 VITALS
DIASTOLIC BLOOD PRESSURE: 60 MMHG | BODY MASS INDEX: 24.45 KG/M2 | SYSTOLIC BLOOD PRESSURE: 110 MMHG | WEIGHT: 155.8 LBS | HEIGHT: 67 IN

## 2020-10-13 DIAGNOSIS — Z79.4 TYPE 2 DIABETES MELLITUS WITH HYPERGLYCEMIA, WITH LONG-TERM CURRENT USE OF INSULIN (HCC): Primary | Chronic | ICD-10-CM

## 2020-10-13 DIAGNOSIS — E06.3 HYPOTHYROIDISM DUE TO HASHIMOTO'S THYROIDITIS: Chronic | ICD-10-CM

## 2020-10-13 DIAGNOSIS — E55.9 VITAMIN D DEFICIENCY: ICD-10-CM

## 2020-10-13 DIAGNOSIS — E78.5 DYSLIPIDEMIA: Chronic | ICD-10-CM

## 2020-10-13 DIAGNOSIS — E03.8 HYPOTHYROIDISM DUE TO HASHIMOTO'S THYROIDITIS: Chronic | ICD-10-CM

## 2020-10-13 DIAGNOSIS — I10 BENIGN ESSENTIAL HYPERTENSION: Chronic | ICD-10-CM

## 2020-10-13 DIAGNOSIS — E11.65 TYPE 2 DIABETES MELLITUS WITH HYPERGLYCEMIA, WITH LONG-TERM CURRENT USE OF INSULIN (HCC): Primary | Chronic | ICD-10-CM

## 2020-10-13 PROCEDURE — 99214 OFFICE O/P EST MOD 30 MIN: CPT | Performed by: NURSE PRACTITIONER

## 2020-10-13 RX ORDER — INSULIN GLARGINE AND LIXISENATIDE 100; 33 U/ML; UG/ML
60 INJECTION, SOLUTION SUBCUTANEOUS DAILY
Qty: 15 PEN | Refills: 3
Start: 2020-10-13 | End: 2020-10-20

## 2020-10-13 RX ORDER — SULFACETAMIDE SODIUM 100 MG/ML
LOTION TOPICAL
COMMUNITY
Start: 2020-09-16 | End: 2021-06-07

## 2020-10-13 RX ORDER — INSULIN GLARGINE AND LIXISENATIDE 100; 33 U/ML; UG/ML
INJECTION, SOLUTION SUBCUTANEOUS
Qty: 15 PEN | Refills: 3
Start: 2020-10-13 | End: 2020-10-13 | Stop reason: SDUPTHER

## 2020-10-13 NOTE — PATIENT INSTRUCTIONS
Increase soliqua to 44 units once daily  Continue to increase every week by 4 units up to 60 units daily max goal for morning bs goal is less than 110 mg/dl

## 2020-10-13 NOTE — PROGRESS NOTES
"Subjective   Magui Dumont is a 65 y.o. female is here today for follow-up.  Chief Complaint   Patient presents with   • Diabetes     F/U type 2 diabetes, recent labs, checks BS every other day, no reading, eye exam      /60   Ht 170.2 cm (67\")   Wt 70.7 kg (155 lb 12.8 oz)   BMI 24.40 kg/m²   Current Outpatient Medications on File Prior to Visit   Medication Sig   • citalopram (CeleXA) 20 MG tablet Take 1 tablet by mouth once daily   • glucose blood test strip OneTouch Verio In Vitro Strip; Patient Sig: CHECK BLOOD GLUCOSE 1 TIME A DAY; E11.65   • Insulin Pen Needle (ReliOn Pen Needles) 32G X 4 MM misc USE 1 PEN NEEDLE ONCE DAILY   • levothyroxine (SYNTHROID, LEVOTHROID) 112 MCG tablet Take 1 tablet by mouth Daily.   • lisinopril-hydrochlorothiazide (PRINZIDE,ZESTORETIC) 20-12.5 MG per tablet Take 1 tablet by mouth once daily   • metFORMIN (GLUCOPHAGE) 1000 MG tablet Take 1 tablet by mouth 2 (Two) Times a Day With Meals.   • Omega-3 Fatty Acids (FISH OIL) 1000 MG capsule delayed-release Take  by mouth daily.   • ONETOUCH DELICA LANCETS FINE misc    • rosuvastatin (CRESTOR) 40 MG tablet Take 1 tablet by mouth Daily.   • vitamin D (ERGOCALCIFEROL) 1.25 MG (97039 UT) capsule capsule Take 1 capsule by mouth 1 (One) Time Per Week.   • [DISCONTINUED] SOLIQUA 100-33 UNT-MCG/ML solution pen-injector injection 30 units sq daily (Patient taking differently: 40 Units. 30 units sq daily)   • Sulfacetamide Sodium, Acne, 10 % lotion APPLY TO CHIN TWICE DAILY     No current facility-administered medications on file prior to visit.      Family History   Problem Relation Age of Onset   • Lung cancer Mother         smoker   • Cancer Brother         cholangiocarcinoma ( 54)   • Diabetes type II Brother    • Lung cancer Maternal Aunt         Adenocarcinoma (6 sisters)   • Diabetes type II Father          at 82   • Coronary artery disease Father    • No Known Problems Sister    • No Known Problems Sister      Social " History     Tobacco Use   • Smoking status: Never Smoker   • Smokeless tobacco: Never Used   Substance Use Topics   • Alcohol use: No   • Drug use: No     No Known Allergies      History of Present Illness   Encounter Diagnoses   Name Primary?   • Type 2 diabetes mellitus with hyperglycemia, with long-term current use of insulin (CMS/[facility]) Yes   • Hypothyroidism due to Hashimoto's thyroiditis    • Benign essential hypertension    • Dyslipidemia    • Vitamin D deficiency      Patient is a 65-year-old female who was seen and physically examined for type 2 diabetes with hyperglycemia, hypothyroidism, benign essential hypertension, dyslipidemia, vitamin D deficiency.  Her labs were reviewed and she was provided a copy.  Her medication list was reviewed for accuracy.  Patient states that she was taking insulin in the morning. Fasting BG is often 280 or so. States that she was not triturating and decided to take 20 at night and 20 in morning. Typically does not eat late at night; dinner around 6 or 7.  She has previously been on Jardiance but did not tolerated.  We discussed increasing her Soliqua to 44 units and then each week that her fasting blood sugar is greater than 110 she is to increase her Soliqua by 4 units to a maximum of 60.  When she gets to the point where she is not controlled at 60 we will have to reevaluate her medication regimen.  Patient denies numbness or tingling in her hands and feet.  Denies issues with low blood sugars less than 70.  She denies lightheadedness, dizziness, syncope. she is current on her eye exam sees Dr. Sam with [facility].  Foot exam was performed there were no skin or toenail abnormality.  She is tolerating all of her other medications per her report.     The following portions of the patient's history were reviewed and updated as appropriate: allergies, current medications, past family history, past medical history, past social history, past surgical history and  problem list.    Review of Systems   Constitutional: Negative.    Cardiovascular: Negative.    Gastrointestinal: Negative.    Endocrine: Negative.    Neurological: Negative.    Psychiatric/Behavioral: Negative for sleep disturbance.       Objective   Physical Exam  Vitals signs and nursing note reviewed.   Constitutional:       General: She is not in acute distress.     Appearance: She is well-developed. She is not diaphoretic.   HENT:      Head: Normocephalic and atraumatic.      Right Ear: External ear normal.      Left Ear: External ear normal.      Nose: Nose normal.      Mouth/Throat:      Pharynx: No oropharyngeal exudate.   Eyes:      General:         Right eye: No discharge.         Left eye: No discharge.      Pupils: Pupils are equal, round, and reactive to light.   Neck:      Musculoskeletal: Full passive range of motion without pain, normal range of motion and neck supple. No edema or erythema.      Thyroid: No thyroid mass or thyromegaly.      Vascular: No carotid bruit.      Trachea: Trachea normal. No tracheal tenderness or tracheal deviation.   Cardiovascular:      Rate and Rhythm: Normal rate and regular rhythm.      Heart sounds: Normal heart sounds. No murmur. No friction rub. No gallop.    Pulmonary:      Effort: Pulmonary effort is normal. No respiratory distress.      Breath sounds: Normal breath sounds. No stridor. No wheezing or rales.   Abdominal:      General: Bowel sounds are normal. There is no distension.      Palpations: Abdomen is soft.   Musculoskeletal: Normal range of motion.         General: No deformity.   Feet:      Right foot:      Skin integrity: Skin integrity normal.      Toenail Condition: Right toenails are normal.      Left foot:      Skin integrity: Skin integrity normal.      Toenail Condition: Left toenails are normal.   Lymphadenopathy:      Cervical: No cervical adenopathy.   Skin:     General: Skin is warm and dry.      Coloration: Skin is not pale.      Findings: No  erythema or rash.   Neurological:      Mental Status: She is alert and oriented to person, place, and time.   Psychiatric:         Behavior: Behavior normal.         Thought Content: Thought content normal.         Judgment: Judgment normal.       Results for orders placed or performed in visit on 10/06/20   T4, Free    Specimen: Blood   Result Value Ref Range    Free T4 1.95 (H) 0.93 - 1.70 ng/dL   T3, Free    Specimen: Blood   Result Value Ref Range    T3, Free 2.9 2.0 - 4.4 pg/mL   Thyroid Panel With TSH    Specimen: Blood   Result Value Ref Range    TSH 0.892 0.450 - 4.500 uIU/mL    T4, Total 10.6 4.5 - 12.0 ug/dL    T3 Uptake 26 24 - 39 %    Free Thyroxine Index 2.8 1.2 - 4.9   C-Peptide    Specimen: Blood   Result Value Ref Range    C-Peptide 2.7 1.1 - 4.4 ng/mL   Hemoglobin A1c    Specimen: Blood   Result Value Ref Range    Hemoglobin A1C 9.00 (H) 4.80 - 5.60 %   Vitamin D 25 Hydroxy    Specimen: Blood   Result Value Ref Range    25 Hydroxy, Vitamin D 51.3 30.0 - 100.0 ng/ml   MicroAlbumin, Urine, Random - Urine, Clean Catch    Specimen: Urine, Clean Catch   Result Value Ref Range    Microalbumin, Urine 3.7 Not Estab. ug/mL   Lipid Panel    Specimen: Blood   Result Value Ref Range    Total Cholesterol 149 0 - 200 mg/dL    Triglycerides 158 (H) 0 - 150 mg/dL    HDL Cholesterol 50 40 - 60 mg/dL    VLDL Cholesterol Gato 31.6 mg/dL    LDL Chol Calc (NIH) 67 0 - 100 mg/dL   Comprehensive Metabolic Panel    Specimen: Blood   Result Value Ref Range    Glucose 233 (H) 65 - 99 mg/dL    BUN 16 8 - 23 mg/dL    Creatinine 0.70 0.57 - 1.00 mg/dL    eGFR Non African Am 84 >60 mL/min/1.73    eGFR African Am 101 >60 mL/min/1.73    BUN/Creatinine Ratio 22.9 7.0 - 25.0    Sodium 138 136 - 145 mmol/L    Potassium 4.3 3.5 - 5.2 mmol/L    Chloride 99 98 - 107 mmol/L    Total CO2 25.0 22.0 - 29.0 mmol/L    Calcium 9.4 8.6 - 10.5 mg/dL    Total Protein 6.7 6.0 - 8.5 g/dL    Albumin 5.10 3.50 - 5.20 g/dL    Globulin 1.6 gm/dL    A/G  Ratio 3.2 g/dL    Total Bilirubin 0.9 0.0 - 1.2 mg/dL    Alkaline Phosphatase 93 39 - 117 U/L    AST (SGOT) 19 1 - 32 U/L    ALT (SGPT) 32 1 - 33 U/L   Cardiovascular Risk Assessment   Result Value Ref Range    Interpretation Note    Diabetes Patient Education   Result Value Ref Range    PDF Image Not applicable            Assessment/Plan   Problems Addressed this Visit        Cardiovascular and Mediastinum    Benign essential hypertension (Chronic)       Digestive    Vitamin D deficiency       Endocrine    Type 2 diabetes mellitus with hyperglycemia, with long-term current use of insulin (CMS/[facility]) - Primary (Chronic)    Relevant Medications    Soliqua 100-33 UNT-MCG/ML solution pen-injector injection    Hypothyroidism (Chronic)       Other    Dyslipidemia (Chronic)      Diagnoses       Codes Comments    Type 2 diabetes mellitus with hyperglycemia, with long-term current use of insulin (CMS/[facility])    -  Primary ICD-10-CM: E11.65, Z79.4  ICD-9-CM: 250.00, 790.29, V58.67     Hypothyroidism due to Hashimoto's thyroiditis     ICD-10-CM: E03.8, E06.3  ICD-9-CM: 244.8, 245.2     Benign essential hypertension     ICD-10-CM: I10  ICD-9-CM: 401.1     Dyslipidemia     ICD-10-CM: E78.5  ICD-9-CM: 272.4     Vitamin D deficiency     ICD-10-CM: E55.9  ICD-9-CM: 268.9         In summary patient was evaluated and physically examined for type 2 diabetes with hyperglycemia, hypothyroidism, benign essential hypertension, dyslipidemia, vitamin D deficiency.  Her labs reviewed and she was provided a copy.  Her medication list was reviewed for accuracy.  Type 2 diabetes with hyperglycemia: Her A1c was 9 and patient states that her fasting blood sugars have been as high as 280.  She was taken soliqua 20 units in the morning and 20 units in the evening.  She is also taking metformin 1000 mg twice daily.  Patient denies numbness or tingling in her hands or extremities.  Her foot exam was performed and no skin or toenail abnormalities  observed.  Patient stated that she saw her eye care provider, Dr. Sam with [facility] a few months ago and sees them at least annually.  She was instructed to take 44 units of Soliqua daily and to check her fasting blood sugars daily.  Each week and that her fasting blood sugars exceed 110 she is to increase her Soliqua by 4 units to a maximum of 60.  If 60 units of Soliqua does not control her blood sugar we will reevaluate her medical regimen.  She had previously been prescribed Jardiance with Dr. Mcfarland but not tolerated.   Hypothyroidism: Patient's thyroid labs are at goal and she currently takes levothyroxine 112mcg.  No observable resting tremor in her hands and no heart palpitations.  Dyslipidemia: Patient's lipid values were at goal.  She currently takes rosuvastatin 40 mg daily and fish oil.  Vitamin D deficiency: Patient's vitamin D is at goal.  She currently takes ergocalciferol 50,000 units weekly.  Essential hypertension: Patient's blood pressure is stable at 110/60.  Currently takes Prinzide; 20/12.5 daily.  Patient questions were answered to her satisfaction.  She was instructed to schedule a follow-up appointment for 6 months with labs prior to the visit.  She was instructed to reach out to the office if she has any questions in the meantime.

## 2020-10-19 DIAGNOSIS — Z79.4 TYPE 2 DIABETES MELLITUS WITH HYPERGLYCEMIA, WITH LONG-TERM CURRENT USE OF INSULIN (HCC): Chronic | ICD-10-CM

## 2020-10-19 DIAGNOSIS — E11.65 TYPE 2 DIABETES MELLITUS WITH HYPERGLYCEMIA, WITH LONG-TERM CURRENT USE OF INSULIN (HCC): Chronic | ICD-10-CM

## 2020-10-20 RX ORDER — INSULIN GLARGINE AND LIXISENATIDE 100; 33 U/ML; UG/ML
INJECTION, SOLUTION SUBCUTANEOUS
Qty: 15 ML | Refills: 0 | Status: SHIPPED | OUTPATIENT
Start: 2020-10-20 | End: 2020-12-21

## 2020-10-20 RX ORDER — CITALOPRAM 20 MG/1
TABLET ORAL
Qty: 90 TABLET | Refills: 0 | Status: SHIPPED | OUTPATIENT
Start: 2020-10-20 | End: 2021-08-11 | Stop reason: SDUPTHER

## 2020-12-21 DIAGNOSIS — Z79.4 TYPE 2 DIABETES MELLITUS WITH HYPERGLYCEMIA, WITH LONG-TERM CURRENT USE OF INSULIN (HCC): Chronic | ICD-10-CM

## 2020-12-21 DIAGNOSIS — E11.65 TYPE 2 DIABETES MELLITUS WITH HYPERGLYCEMIA, WITH LONG-TERM CURRENT USE OF INSULIN (HCC): Chronic | ICD-10-CM

## 2020-12-21 RX ORDER — INSULIN GLARGINE AND LIXISENATIDE 100; 33 U/ML; UG/ML
INJECTION, SOLUTION SUBCUTANEOUS
Qty: 15 ML | Refills: 3 | Status: SHIPPED | OUTPATIENT
Start: 2020-12-21 | End: 2021-06-07

## 2020-12-30 ENCOUNTER — APPOINTMENT (OUTPATIENT)
Dept: WOMENS IMAGING | Facility: HOSPITAL | Age: 65
End: 2020-12-30

## 2020-12-30 PROCEDURE — 77067 SCR MAMMO BI INCL CAD: CPT | Performed by: RADIOLOGY

## 2020-12-30 PROCEDURE — 77063 BREAST TOMOSYNTHESIS BI: CPT | Performed by: RADIOLOGY

## 2021-03-19 ENCOUNTER — BULK ORDERING (OUTPATIENT)
Dept: CASE MANAGEMENT | Facility: OTHER | Age: 66
End: 2021-03-19

## 2021-03-19 DIAGNOSIS — Z23 IMMUNIZATION DUE: ICD-10-CM

## 2021-03-23 DIAGNOSIS — E55.9 VITAMIN D DEFICIENCY: ICD-10-CM

## 2021-03-23 DIAGNOSIS — E03.8 HYPOTHYROIDISM DUE TO HASHIMOTO'S THYROIDITIS: ICD-10-CM

## 2021-03-23 DIAGNOSIS — E06.3 HYPOTHYROIDISM DUE TO HASHIMOTO'S THYROIDITIS: ICD-10-CM

## 2021-03-23 DIAGNOSIS — E11.65 TYPE 2 DIABETES MELLITUS WITH HYPERGLYCEMIA, WITH LONG-TERM CURRENT USE OF INSULIN (HCC): Primary | ICD-10-CM

## 2021-03-23 DIAGNOSIS — I10 BENIGN ESSENTIAL HYPERTENSION: ICD-10-CM

## 2021-03-23 DIAGNOSIS — E78.5 DYSLIPIDEMIA: ICD-10-CM

## 2021-03-23 DIAGNOSIS — Z79.4 TYPE 2 DIABETES MELLITUS WITH HYPERGLYCEMIA, WITH LONG-TERM CURRENT USE OF INSULIN (HCC): Primary | ICD-10-CM

## 2021-03-31 LAB
25(OH)D3+25(OH)D2 SERPL-MCNC: 55.2 NG/ML (ref 30–100)
ALBUMIN SERPL-MCNC: 4.7 G/DL (ref 3.5–5.2)
ALBUMIN/CREAT UR: <9 MG/G CREAT (ref 0–29)
ALBUMIN/GLOB SERPL: 2.4 G/DL
ALP SERPL-CCNC: 81 U/L (ref 39–117)
ALT SERPL-CCNC: 28 U/L (ref 1–33)
AST SERPL-CCNC: 27 U/L (ref 1–32)
BILIRUB SERPL-MCNC: 1 MG/DL (ref 0–1.2)
BUN SERPL-MCNC: 13 MG/DL (ref 8–23)
BUN/CREAT SERPL: 18.8 (ref 7–25)
CALCIUM SERPL-MCNC: 9.7 MG/DL (ref 8.6–10.5)
CHLORIDE SERPL-SCNC: 101 MMOL/L (ref 98–107)
CHOLEST SERPL-MCNC: 139 MG/DL (ref 0–200)
CO2 SERPL-SCNC: 29.1 MMOL/L (ref 22–29)
CREAT SERPL-MCNC: 0.69 MG/DL (ref 0.57–1)
CREAT UR-MCNC: 32.4 MG/DL
GLOBULIN SER CALC-MCNC: 2 GM/DL
GLUCOSE SERPL-MCNC: 184 MG/DL (ref 65–99)
HBA1C MFR BLD: 7.8 % (ref 4.8–5.6)
HDLC SERPL-MCNC: 47 MG/DL (ref 40–60)
IMP & REVIEW OF LAB RESULTS: NORMAL
LDLC SERPL CALC-MCNC: 68 MG/DL (ref 0–100)
Lab: NORMAL
MICROALBUMIN UR-MCNC: <3 UG/ML
POTASSIUM SERPL-SCNC: 4.1 MMOL/L (ref 3.5–5.2)
PROT SERPL-MCNC: 6.7 G/DL (ref 6–8.5)
SODIUM SERPL-SCNC: 142 MMOL/L (ref 136–145)
TRIGL SERPL-MCNC: 135 MG/DL (ref 0–150)
TSH SERPL DL<=0.005 MIU/L-ACNC: 0.25 UIU/ML (ref 0.27–4.2)
VLDLC SERPL CALC-MCNC: 24 MG/DL (ref 5–40)

## 2021-04-14 ENCOUNTER — OFFICE VISIT (OUTPATIENT)
Dept: ENDOCRINOLOGY | Age: 66
End: 2021-04-14

## 2021-04-14 VITALS
WEIGHT: 155.4 LBS | BODY MASS INDEX: 24.39 KG/M2 | SYSTOLIC BLOOD PRESSURE: 132 MMHG | DIASTOLIC BLOOD PRESSURE: 70 MMHG | HEIGHT: 67 IN

## 2021-04-14 DIAGNOSIS — E03.8 HYPOTHYROIDISM DUE TO HASHIMOTO'S THYROIDITIS: ICD-10-CM

## 2021-04-14 DIAGNOSIS — Z79.4 TYPE 2 DIABETES MELLITUS WITH HYPERGLYCEMIA, WITH LONG-TERM CURRENT USE OF INSULIN (HCC): Primary | ICD-10-CM

## 2021-04-14 DIAGNOSIS — E78.5 DYSLIPIDEMIA: ICD-10-CM

## 2021-04-14 DIAGNOSIS — E11.65 TYPE 2 DIABETES MELLITUS WITH HYPERGLYCEMIA, WITH LONG-TERM CURRENT USE OF INSULIN (HCC): Primary | ICD-10-CM

## 2021-04-14 DIAGNOSIS — E06.3 HYPOTHYROIDISM DUE TO HASHIMOTO'S THYROIDITIS: ICD-10-CM

## 2021-04-14 PROCEDURE — 99214 OFFICE O/P EST MOD 30 MIN: CPT | Performed by: NURSE PRACTITIONER

## 2021-04-14 RX ORDER — LEVOTHYROXINE SODIUM 0.1 MG/1
100 TABLET ORAL DAILY
Qty: 30 TABLET | Refills: 5 | Status: SHIPPED | OUTPATIENT
Start: 2021-04-14 | End: 2021-10-29 | Stop reason: SDUPTHER

## 2021-04-14 NOTE — PROGRESS NOTES
"Chief Complaint  Diabetes    Subjective          Magui Dumont presents to [facility]  History of Present Illness     Type 2 dm    Diagnosed about 20 years ago.   Today in clinic pt reports being on Soliqua 46u, Metformin 1000mg BID  FBG - 180s  Checks BG - 2x/week  Dm retinopathy - denies ,Last eye exam - 3 months ago  Dm nephropathy - denies  Dm neuropathy - denies   CAD - denies  CVA - denies  Episodes of hypoglycemia - denies  Pt is physically active. weight has been stable.   Pt tries to follow DM diet for most part.   On Ace inb.  Lab Results   Component Value Date    HGBA1C 7.80 (H) 03/30/2021         Hyperlipidemia  Rosuvastatin 40 mg daily  Lab Results   Component Value Date    CHLPL 139 03/30/2021    TRIG 135 03/30/2021    HDL 47 03/30/2021    LDL 68 03/30/2021         Hypothyroidism  Levothyroxine 112 mcg daily  Reports compliance  Denies dysphagia and h/o nodules  Lab Results   Component Value Date    TSH 0.252 (L) 03/30/2021         Objective   Vital Signs:   /70   Ht 170.2 cm (67\")   Wt 70.5 kg (155 lb 6.4 oz)   BMI 24.34 kg/m²     Physical Exam  Vitals reviewed.   Constitutional:       General: She is not in acute distress.  HENT:      Head: Normocephalic and atraumatic.   Cardiovascular:      Rate and Rhythm: Normal rate and regular rhythm.   Pulmonary:      Effort: Pulmonary effort is normal. No respiratory distress.   Musculoskeletal:         General: No signs of injury. Normal range of motion.      Cervical back: Normal range of motion and neck supple.   Skin:     General: Skin is warm and dry.   Neurological:      Mental Status: She is alert and oriented to person, place, and time. Mental status is at baseline.   Psychiatric:         Mood and Affect: Mood normal.         Behavior: Behavior normal.         Thought Content: Thought content normal.         Judgment: Judgment normal.        Result Review :   The following data was reviewed by: Margarita Luis, " APRN on 04/14/2021:  Common labs    Common Labsle 10/6/20 10/6/20 10/6/20 10/6/20 3/30/21 3/30/21 3/30/21 3/30/21    1249 1249 1249 1249 1305 1305 1305 1305   Glucose    233 (A) 184 (A)      BUN    16 13      Creatinine    0.70 0.69      eGFR Non African Am    84 85      eGFR  Am    101 103      Sodium    138 142      Potassium    4.3 4.1      Chloride    99 101      Calcium    9.4 9.7      Total Protein    6.7 6.7      Albumin    5.10 4.70      Total Bilirubin    0.9 1.0      Alkaline Phosphatase    93 81      AST (SGOT)    19 27      ALT (SGPT)    32 28      Total Cholesterol   149   139     Triglycerides   158 (A)   135     HDL Cholesterol   50   47     LDL Cholesterol    67   68     Hemoglobin A1C 9.00 (A)       7.80 (A)   Microalbumin, Urine  3.7     <3.0    (A) Abnormal value       Comments are available for some flowsheets but are not being displayed.                     Assessment and Plan {CC Problem List  Visit Diagnosis  ROS  Review (Popup)  Health Maintenance  Quality  BestPractice  Medications  SmartSets  SnapShot Encounters  Media :23}   Diagnoses and all orders for this visit:    1. Type 2 diabetes mellitus with hyperglycemia, with long-term current use of insulin (CMS/[facility]) (Primary)  -     Hemoglobin A1c; Future  -     Basic Metabolic Panel; Future    2. Dyslipidemia  -     Basic Metabolic Panel; Future    3. Hypothyroidism due to Hashimoto's thyroiditis  -     TSH; Future  -     T4, Free; Future    Other orders  -     levothyroxine (Synthroid) 100 MCG tablet; Take 1 tablet by mouth Daily.  Dispense: 30 tablet; Refill: 5        Follow Up   Return in about 6 months (around 10/14/2021).   A1c improving and near target  Low hypoglycemia risk  Continue statin and low-cholesterol diet  Continue Soliqua and Metformin  Decrease levothyroxine dose to 100 mcg, we will repeat labs before next visit  Needs close monitoring to reduce risk of complications from over or under treatment with  thyroid hormone replacement    Patient was given instructions and counseling regarding her condition or for health maintenance advice. Please see specific information pulled into the AVS if appropriate.     Margarita Luis APRN

## 2021-05-24 DIAGNOSIS — E55.9 VITAMIN D DEFICIENCY: ICD-10-CM

## 2021-05-24 DIAGNOSIS — E78.5 DYSLIPIDEMIA: ICD-10-CM

## 2021-05-24 DIAGNOSIS — I10 BENIGN ESSENTIAL HYPERTENSION: ICD-10-CM

## 2021-05-24 DIAGNOSIS — E11.9 CONTROLLED TYPE 2 DIABETES MELLITUS WITHOUT COMPLICATION (HCC): ICD-10-CM

## 2021-05-24 RX ORDER — LISINOPRIL AND HYDROCHLOROTHIAZIDE 20; 12.5 MG/1; MG/1
1 TABLET ORAL DAILY
Qty: 90 TABLET | Refills: 1 | OUTPATIENT
Start: 2021-05-24

## 2021-05-24 NOTE — TELEPHONE ENCOUNTER
Pt needs script for metformin   And lisinopril    Send to [facility]     [phone]    Pt is out

## 2021-05-25 DIAGNOSIS — I10 BENIGN ESSENTIAL HYPERTENSION: ICD-10-CM

## 2021-05-25 DIAGNOSIS — E11.9 CONTROLLED TYPE 2 DIABETES MELLITUS WITHOUT COMPLICATION (HCC): ICD-10-CM

## 2021-05-25 DIAGNOSIS — E55.9 VITAMIN D DEFICIENCY: ICD-10-CM

## 2021-05-25 DIAGNOSIS — E78.5 DYSLIPIDEMIA: ICD-10-CM

## 2021-05-25 RX ORDER — LISINOPRIL AND HYDROCHLOROTHIAZIDE 20; 12.5 MG/1; MG/1
1 TABLET ORAL DAILY
Qty: 90 TABLET | Refills: 0 | Status: SHIPPED | OUTPATIENT
Start: 2021-05-25 | End: 2021-11-15

## 2021-05-25 NOTE — TELEPHONE ENCOUNTER
Caller: Magui Dumont    Relationship: Self    Best call back number: [phone]    Medication needed:   Requested Prescriptions     Pending Prescriptions Disp Refills   • lisinopril-hydrochlorothiazide (PRINZIDE,ZESTORETIC) 20-12.5 MG per tablet 90 tablet 1     Sig: Take 1 tablet by mouth Daily.       When do you need the refill by: TODAY    What additional details did the patient provide when requesting the medication: PATIENT IS OUT OF MEDICINE. SHE IS SCHEDULED FOR A VISIT ON 8/11 WITH DR. ZUNIGA.    Does the patient have less than a 3 day supply:  [x] Yes  [] No    What is the patient's preferred pharmacy: [facility] [address] [place] [id number] [facility] [phone] [facility] [phone]

## 2021-05-25 NOTE — TELEPHONE ENCOUNTER
She appears to be way overdue. Call patient and see what's going on regarding her follow-up.    Wait for ab 6 mo for more mesurements. If LIZZY presists [facility].

## 2021-06-07 ENCOUNTER — OFFICE VISIT (OUTPATIENT)
Dept: INTERNAL MEDICINE | Age: 66
End: 2021-06-07

## 2021-06-07 VITALS
BODY MASS INDEX: 24.33 KG/M2 | WEIGHT: 155 LBS | SYSTOLIC BLOOD PRESSURE: 126 MMHG | OXYGEN SATURATION: 99 % | HEART RATE: 97 BPM | HEIGHT: 67 IN | DIASTOLIC BLOOD PRESSURE: 74 MMHG | TEMPERATURE: 97.3 F

## 2021-06-07 DIAGNOSIS — F32.A DEPRESSION, UNSPECIFIED DEPRESSION TYPE: Chronic | ICD-10-CM

## 2021-06-07 DIAGNOSIS — E06.3 HYPOTHYROIDISM DUE TO HASHIMOTO'S THYROIDITIS: Chronic | ICD-10-CM

## 2021-06-07 DIAGNOSIS — E78.5 DYSLIPIDEMIA: Chronic | ICD-10-CM

## 2021-06-07 DIAGNOSIS — E11.65 TYPE 2 DIABETES MELLITUS WITH HYPERGLYCEMIA, WITH LONG-TERM CURRENT USE OF INSULIN (HCC): Primary | ICD-10-CM

## 2021-06-07 DIAGNOSIS — E03.8 HYPOTHYROIDISM DUE TO HASHIMOTO'S THYROIDITIS: Chronic | ICD-10-CM

## 2021-06-07 DIAGNOSIS — Z79.4 TYPE 2 DIABETES MELLITUS WITH HYPERGLYCEMIA, WITH LONG-TERM CURRENT USE OF INSULIN (HCC): Primary | ICD-10-CM

## 2021-06-07 DIAGNOSIS — I10 BENIGN ESSENTIAL HYPERTENSION: Chronic | ICD-10-CM

## 2021-06-07 LAB — HBA1C MFR BLD: 7.3 %

## 2021-06-07 PROCEDURE — 83036 HEMOGLOBIN GLYCOSYLATED A1C: CPT | Performed by: INTERNAL MEDICINE

## 2021-06-07 PROCEDURE — 99214 OFFICE O/P EST MOD 30 MIN: CPT | Performed by: INTERNAL MEDICINE

## 2021-06-07 RX ORDER — INSULIN GLARGINE AND LIXISENATIDE 100; 33 U/ML; UG/ML
46 INJECTION, SOLUTION SUBCUTANEOUS EVERY MORNING
Start: 2021-06-07 | End: 2021-06-30 | Stop reason: SDUPTHER

## 2021-06-07 NOTE — PROGRESS NOTES
I N T E R N A L  M E D I C I N E  J U N O H  K I M,  M D      ENCOUNTER DATE:  06/07/2021    Magui Dumont / 65 y.o. / female      CHIEF COMPLAINT / REASON FOR OFFICE VISIT     Hypertension, Diabetes, Hypothyroidism, and Dyslipidemia      ASSESSMENT & PLAN     Problem List Items Addressed This Visit        High    Type 2 diabetes mellitus with hyperglycemia, with long-term current use of insulin (CMS/[facility]) - Primary (Chronic)    Overview     * Previously managed by endocrinologist (Bart[facility]). Will not be seeing endocrinology going forward.          Current Assessment & Plan     A1c today is 7.3.   Discussed changing her regimen to basal insulin + once weekly GLP-1 agonist.  Provided list of both classes for her to check with her insurance regarding cost.   Currently using Soliqua 100/33 46 units daily. Continue metformin.     Lab Results   Component Value Date    HGBA1C 7.3 06/07/2021    HGBA1C 7.80 (H) 03/30/2021    HGBA1C 9.00 (H) 10/06/2020    CREATININE 0.69 03/30/2021    LDL 68 03/30/2021    MICROALBUR <3.0 03/30/2021             Relevant Medications    vitamin D (ERGOCALCIFEROL) 1.25 MG (68580 UT) capsule capsule    lisinopril-hydrochlorothiazide (PRINZIDE,ZESTORETIC) 20-12.5 MG per tablet    metFORMIN (GLUCOPHAGE) 1000 MG tablet    Soliqua 100-33 UNT-MCG/ML solution pen-injector injection    Other Relevant Orders    POC Glycosylated Hemoglobin (Hb A1C) (Completed)    [facility] Glucose Flowsheet       Medium    Benign essential hypertension (Chronic)    Overview     Continue lisinopril-hctz 20/12.5 mg qd.          Relevant Medications    vitamin D (ERGOCALCIFEROL) 1.25 MG (18997 UT) capsule capsule    lisinopril-hydrochlorothiazide (PRINZIDE,ZESTORETIC) 20-12.5 MG per tablet    Dyslipidemia (Chronic)    Current Assessment & Plan     Currently on rosuvastatin 40 mg. Consider lowering dose to 20 mg and adding Zetia. Discussed.     Lab Results   Component Value Date    LDL 68 03/30/2021    LDL 67 10/06/2020    LDL  "91 02/10/2020    HDL 47 03/30/2021    TRIG 135 03/30/2021             Relevant Medications    vitamin D (ERGOCALCIFEROL) 1.25 MG (38817 UT) capsule capsule    lisinopril-hydrochlorothiazide (PRINZIDE,ZESTORETIC) 20-12.5 MG per tablet    Hypothyroidism (Chronic)    Current Assessment & Plan     Previously decreased levothyroxine to 100 mcg (by endocrine).   Will not be seeing endocrinologist any longer.   Will plan to recheck TSH and Free T4 on follow-up.   Continue levothyroxine 100 mcg for now.     Lab Results   Component Value Date    TSH 0.252 (L) 03/30/2021    TSH 0.892 10/06/2020    TSH 0.690 02/10/2020    FREET4 1.95 (H) 10/06/2020    FREET4 1.83 (H) 02/10/2020    FREET4 1.66 06/24/2019             Relevant Medications    vitamin D (ERGOCALCIFEROL) 1.25 MG (54781 UT) capsule capsule    levothyroxine (Synthroid) 100 MCG tablet       Low    Depression (Chronic)    Overview     Continue citalopram 20 mg qd.          Relevant Medications    citalopram (CeleXA) 20 MG tablet        Orders Placed This Encounter   Procedures   • MyChart Glucose Flowsheet   • POC Glycosylated Hemoglobin (Hb A1C)     New Medications Ordered This Visit   Medications   • Soliqua 100-33 UNT-MCG/ML solution pen-injector injection     Sig: Inject 46 Units under the skin into the appropriate area as directed Every Morning.       SUMMARY/DISCUSSION  •       Next Appointment with me: 8/11/2021    Return for Next scheduled follow up.        VITAL SIGNS     Visit Vitals  /74 (BP Location: Left arm)   Pulse 97   Temp 97.3 °F (36.3 °C)   Ht 170.2 cm (67\")   Wt 70.3 kg (155 lb)   SpO2 99%   BMI 24.28 kg/m²       BP Readings from Last 3 Encounters:   06/07/21 126/74   04/14/21 132/70   10/13/20 110/60     Wt Readings from Last 3 Encounters:   06/07/21 70.3 kg (155 lb)   04/14/21 70.5 kg (155 lb 6.4 oz)   10/13/20 70.7 kg (155 lb 12.8 oz)     Body mass index is 24.28 kg/m².      MEDICATIONS AT THE TIME OF OFFICE VISIT     Current Outpatient " Medications on File Prior to Visit   Medication Sig   • citalopram (CeleXA) 20 MG tablet Take 1 tablet by mouth once daily   • glucose blood test strip OneTouch Verio In Vitro Strip; Patient Sig: CHECK BLOOD GLUCOSE 1 TIME A DAY; E11.65   • glucose blood test strip OneTouch Verio test strips   USE 1 STRIP TO CHECK GLUCOSE ONCE DAILY   • Insulin Pen Needle (ReliOn Pen Needles) 32G X 4 MM misc USE 1 PEN NEEDLE ONCE DAILY   • levothyroxine (Synthroid) 100 MCG tablet Take 1 tablet by mouth Daily.   • lisinopril-hydrochlorothiazide (PRINZIDE,ZESTORETIC) 20-12.5 MG per tablet Take 1 tablet by mouth Daily.   • metFORMIN (GLUCOPHAGE) 1000 MG tablet Take 1 tablet by mouth 2 (Two) Times a Day With Meals.   • Omega-3 Fatty Acids (FISH OIL) 1000 MG capsule delayed-release Take  by mouth daily.   • ONETOUCH DELICA LANCETS FINE misc    • rosuvastatin (CRESTOR) 40 MG tablet Take 1 tablet by mouth Daily.   • vitamin D (ERGOCALCIFEROL) 1.25 MG (62930 UT) capsule capsule Take 1 capsule by mouth 1 (One) Time Per Week.   • Soliqua 100-33 UNT-MCG/ML solution pen-injector injection INJECT 60  SUBCUTANEOUSLY ONCE DAILY WITH BREAKFAST       HISTORY OF PRESENT ILLNESS     Previously seen by Dr. Mcfarland and most recently seen by APRN at endocrine.   Complains of cost of Soliqua, currently taking 46 units daily along with metformin.   She started this about 1 year ago. Denies significant side effects.   A1c today is 7.3.     Lab Results   Component Value Date    HGBA1C 7.3 06/07/2021    HGBA1C 7.80 (H) 03/30/2021    HGBA1C 9.00 (H) 10/06/2020    CREATININE 0.69 03/30/2021    LDL 68 03/30/2021    MICROALBUR <3.0 03/30/2021      Hypertension remains stable on lisinopril-hctz.   On rosuvastatin 40 mg hyperlipidemia.   Levothyroxine decreased to 100 mcg by endocrine APRN in March.       REVIEW OF SYSTEMS     Constitutional neg except per HPI   Resp neg  CV neg   GI neg for abdominal pain, nausea/vomiting       PHYSICAL EXAMINATION     Physical  Exam  General: Alert with intact judgment   Psych: Normal mood and affect. Alert and intact judgment.     REVIEWED DATA     Labs:       Lab Results   Component Value Date     03/30/2021    K 4.1 03/30/2021    CALCIUM 9.7 03/30/2021    AST 27 03/30/2021    ALT 28 03/30/2021    BUN 13 03/30/2021    CREATININE 0.69 03/30/2021    CREATININE 0.70 10/06/2020    CREATININE 0.73 02/10/2020    EGFRIFNONA 85 03/30/2021    EGFRIFAFRI 103 03/30/2021       Lab Results   Component Value Date    HGBA1C 7.3 06/07/2021    HGBA1C 7.80 (H) 03/30/2021    HGBA1C 9.00 (H) 10/06/2020       Lab Results   Component Value Date    LDL 68 03/30/2021    LDL 67 10/06/2020    HDL 47 03/30/2021    TRIG 135 03/30/2021       Lab Results   Component Value Date    TSH 0.252 (L) 03/30/2021    TSH 0.892 10/06/2020    TSH 0.690 02/10/2020    FREET4 1.95 (H) 10/06/2020    FREET4 1.83 (H) 02/10/2020    FREET4 1.66 06/24/2019       Lab Results   Component Value Date    WBC 6.46 12/14/2017    HGB 14.1 12/14/2017     12/14/2017         Imaging:           Medical Tests:           Summary of old records / correspondence / consultant report:           Request outside records:           *Examiner was wearing KN95 mask during the entire duration of the visit. Patient was masked the entire time.   Minimum social distance of 6 ft maintained entire visit except if physical contact was necessary as documented.     **Dragon Disclaimer:   Much of this encounter note is an electronic transcription/translation of spoken language to printed text. The electronic translation of spoken language may permit erroneous, or at times, nonsensical words or phrases to be inadvertently transcribed. Although I have reviewed the note for such errors, some may still exist.     Template created by Bear Brown MD

## 2021-06-07 NOTE — ASSESSMENT & PLAN NOTE
Currently on rosuvastatin 40 mg. Consider lowering dose to 20 mg and adding Zetia. Discussed.     Lab Results   Component Value Date    LDL 68 03/30/2021    LDL 67 10/06/2020    LDL 91 02/10/2020    HDL 47 03/30/2021    TRIG 135 03/30/2021

## 2021-06-07 NOTE — ASSESSMENT & PLAN NOTE
A1c today is 7.3.   Discussed changing her regimen to basal insulin + once weekly GLP-1 agonist.  Provided list of both classes for her to check with her insurance regarding cost.   Currently using Soliqua 100/33 46 units daily. Continue metformin.     Lab Results   Component Value Date    HGBA1C 7.3 06/07/2021    HGBA1C 7.80 (H) 03/30/2021    HGBA1C 9.00 (H) 10/06/2020    CREATININE 0.69 03/30/2021    LDL 68 03/30/2021    MICROALBUR <3.0 03/30/2021

## 2021-06-07 NOTE — ASSESSMENT & PLAN NOTE
Previously decreased levothyroxine to 100 mcg (by endocrine).   Will not be seeing endocrinologist any longer.   Will plan to recheck TSH and Free T4 on follow-up.   Continue levothyroxine 100 mcg for now.     Lab Results   Component Value Date    TSH 0.252 (L) 03/30/2021    TSH 0.892 10/06/2020    TSH 0.690 02/10/2020    FREET4 1.95 (H) 10/06/2020    FREET4 1.83 (H) 02/10/2020    FREET4 1.66 06/24/2019

## 2021-06-30 DIAGNOSIS — E11.65 TYPE 2 DIABETES MELLITUS WITH HYPERGLYCEMIA, WITH LONG-TERM CURRENT USE OF INSULIN (HCC): ICD-10-CM

## 2021-06-30 DIAGNOSIS — Z79.4 TYPE 2 DIABETES MELLITUS WITH HYPERGLYCEMIA, WITH LONG-TERM CURRENT USE OF INSULIN (HCC): ICD-10-CM

## 2021-06-30 RX ORDER — INSULIN GLARGINE AND LIXISENATIDE 100; 33 U/ML; UG/ML
46 INJECTION, SOLUTION SUBCUTANEOUS EVERY MORNING
Start: 2021-06-30 | End: 2021-07-02 | Stop reason: SDUPTHER

## 2021-06-30 NOTE — TELEPHONE ENCOUNTER
Needs soliqua 100 - 33  One dose left  Needs to be sent ASAP to   [facility] [address] [place] [address] - [id number] [facility] - [phone]  - [phone] FX Phone:  [phone]   Fax:  [phone]

## 2021-07-02 DIAGNOSIS — Z79.4 TYPE 2 DIABETES MELLITUS WITH HYPERGLYCEMIA, WITH LONG-TERM CURRENT USE OF INSULIN (HCC): ICD-10-CM

## 2021-07-02 DIAGNOSIS — E11.65 TYPE 2 DIABETES MELLITUS WITH HYPERGLYCEMIA, WITH LONG-TERM CURRENT USE OF INSULIN (HCC): ICD-10-CM

## 2021-07-02 RX ORDER — INSULIN GLARGINE AND LIXISENATIDE 100; 33 U/ML; UG/ML
46 INJECTION, SOLUTION SUBCUTANEOUS EVERY MORNING
Qty: 45 ML | Refills: 1 | Status: SHIPPED | OUTPATIENT
Start: 2021-07-02 | End: 2021-08-12

## 2021-08-11 ENCOUNTER — OFFICE VISIT (OUTPATIENT)
Dept: INTERNAL MEDICINE | Age: 66
End: 2021-08-11

## 2021-08-11 VITALS
WEIGHT: 155 LBS | OXYGEN SATURATION: 98 % | BODY MASS INDEX: 24.33 KG/M2 | HEIGHT: 67 IN | SYSTOLIC BLOOD PRESSURE: 128 MMHG | HEART RATE: 127 BPM | TEMPERATURE: 96.8 F | DIASTOLIC BLOOD PRESSURE: 62 MMHG

## 2021-08-11 DIAGNOSIS — E11.65 TYPE 2 DIABETES MELLITUS WITH HYPERGLYCEMIA, WITH LONG-TERM CURRENT USE OF INSULIN (HCC): Chronic | ICD-10-CM

## 2021-08-11 DIAGNOSIS — Z78.0 POSTMENOPAUSAL STATE: ICD-10-CM

## 2021-08-11 DIAGNOSIS — E55.9 VITAMIN D DEFICIENCY: ICD-10-CM

## 2021-08-11 DIAGNOSIS — I10 BENIGN ESSENTIAL HYPERTENSION: Chronic | ICD-10-CM

## 2021-08-11 DIAGNOSIS — F32.A DEPRESSION, UNSPECIFIED DEPRESSION TYPE: ICD-10-CM

## 2021-08-11 DIAGNOSIS — Z00.00 MEDICARE ANNUAL WELLNESS VISIT, INITIAL: Primary | ICD-10-CM

## 2021-08-11 DIAGNOSIS — B37.2 CANDIDIASIS, INTERTRIGINOUS: ICD-10-CM

## 2021-08-11 DIAGNOSIS — Z13.820 ENCOUNTER FOR SCREENING FOR OSTEOPOROSIS: ICD-10-CM

## 2021-08-11 DIAGNOSIS — Z79.4 TYPE 2 DIABETES MELLITUS WITH HYPERGLYCEMIA, WITH LONG-TERM CURRENT USE OF INSULIN (HCC): Chronic | ICD-10-CM

## 2021-08-11 DIAGNOSIS — E78.5 DYSLIPIDEMIA: Chronic | ICD-10-CM

## 2021-08-11 DIAGNOSIS — E03.9 HYPOTHYROIDISM, UNSPECIFIED TYPE: ICD-10-CM

## 2021-08-11 DIAGNOSIS — K64.9 HEMORRHOIDS, UNSPECIFIED HEMORRHOID TYPE: ICD-10-CM

## 2021-08-11 DIAGNOSIS — Z79.4 TYPE 2 DIABETES MELLITUS WITHOUT COMPLICATION, WITH LONG-TERM CURRENT USE OF INSULIN (HCC): ICD-10-CM

## 2021-08-11 DIAGNOSIS — E11.9 TYPE 2 DIABETES MELLITUS WITHOUT COMPLICATION, WITH LONG-TERM CURRENT USE OF INSULIN (HCC): ICD-10-CM

## 2021-08-11 LAB
HBA1C MFR BLD: 8.3 % (ref 4.8–5.6)
T4 FREE SERPL-MCNC: 1.63 NG/DL (ref 0.93–1.7)
TSH SERPL DL<=0.005 MIU/L-ACNC: 2.2 UIU/ML (ref 0.27–4.2)

## 2021-08-11 PROCEDURE — G0438 PPPS, INITIAL VISIT: HCPCS | Performed by: INTERNAL MEDICINE

## 2021-08-11 PROCEDURE — 99214 OFFICE O/P EST MOD 30 MIN: CPT | Performed by: INTERNAL MEDICINE

## 2021-08-11 RX ORDER — CITALOPRAM 20 MG/1
20 TABLET ORAL DAILY
Qty: 90 TABLET | Refills: 1 | Status: SHIPPED | OUTPATIENT
Start: 2021-08-11 | End: 2022-07-20 | Stop reason: SDUPTHER

## 2021-08-11 RX ORDER — NYSTATIN 100000 [USP'U]/G
POWDER TOPICAL 2 TIMES DAILY
Qty: 60 G | Refills: 3 | Status: SHIPPED | OUTPATIENT
Start: 2021-08-11 | End: 2022-05-02

## 2021-08-11 RX ORDER — HYDROCORTISONE 25 MG/G
CREAM TOPICAL 2 TIMES DAILY
Qty: 28 G | Refills: 2 | Status: SHIPPED | OUTPATIENT
Start: 2021-08-11

## 2021-08-11 RX ORDER — PEN NEEDLE, DIABETIC 32GX 5/32"
NEEDLE, DISPOSABLE MISCELLANEOUS
Qty: 100 EACH | Refills: 3 | Status: SHIPPED | OUTPATIENT
Start: 2021-08-11 | End: 2022-08-22

## 2021-08-11 RX ORDER — ERGOCALCIFEROL 1.25 MG/1
50000 CAPSULE ORAL WEEKLY
Qty: 13 CAPSULE | Refills: 3 | Status: SHIPPED | OUTPATIENT
Start: 2021-08-11 | End: 2022-12-19

## 2021-08-11 NOTE — PROGRESS NOTES
"    I N T E R N A L  M E D I C I N E  J U N O H  K I M,  M D      ENCOUNTER DATE:  08/11/2021    Magui Dumont / 65 y.o. / female        MEDICARE ANNUAL WELLNESS VISIT       Chief Complaint: Medicare Wellness-Initial Visit       Patient's general assessment of her health since a year ago:     - Compared to one year ago, she feels her physical health is about the same without significant change.    - Compared to one year ago, she feels her mental health is about the same without significant change.      HPI for other active medical problems:     She decided to stay on Soliqua for diabetes. Does not see endocrinologist any longer.   Lab Results   Component Value Date    HGBA1C 7.3 06/07/2021    HGBA1C 7.80 (H) 03/30/2021    HGBA1C 9.00 (H) 10/06/2020      Does not check blood pressure at home. Denies headaches, vision change for chest pain.     Depression is stable on citalopram 20 mg without problems.     Has not had TSH and Free T4 checked since last dose adjustment.   Lab Results   Component Value Date    TSH 0.252 (L) 03/30/2021    TSH 0.892 10/06/2020    TSH 0.690 02/10/2020    FREET4 1.95 (H) 10/06/2020    FREET4 1.83 (H) 02/10/2020    FREET4 1.66 06/24/2019      On chronic vitamin D 50,000 IU weekly.   Lab Results   Component Value Date    DNHE61OK 55.2 03/30/2021    UFSJ43UI 51.3 10/06/2020    LLAD65YX 40.4 02/10/2020            * The required components of Health Risk Assessment (HRA) that were completed by the patient and/or my staff are contained within this note and in the scanned documents titled \"Health Risk Assessment\" within the media section of the patient's chart in [employer].         HISTORY       Recent Hospitalizations:    Recent hospitalization?:     If YES, location, date, and diagnoses:     · Location:   · Date:   · Principle Discharge Dx:   · Secondary Dx:       Patient Care Team:    Patient Care Team:  Venkat Brown MD as PCP - General (Internal Medicine)  Harsh Velasco MD as Consulting " Physician (Obstetrics and Gynecology)      Allergies:  Patient has no known allergies.    Medications:  Current Outpatient Medications on File Prior to Visit   Medication Sig Dispense Refill   • citalopram (CeleXA) 20 MG tablet Take 1 tablet by mouth once daily 90 tablet 0   • glucose blood test strip OneTouch Verio In Vitro Strip; Patient Sig: CHECK BLOOD GLUCOSE 1 TIME A DAY; E11.65 100 each 1   • glucose blood test strip OneTouch Verio test strips   USE 1 STRIP TO CHECK GLUCOSE ONCE DAILY     • Insulin Pen Needle (ReliOn Pen Needles) 32G X 4 MM misc USE 1 PEN NEEDLE ONCE DAILY 100 each 3   • levothyroxine (Synthroid) 100 MCG tablet Take 1 tablet by mouth Daily. 30 tablet 5   • lisinopril-hydrochlorothiazide (PRINZIDE,ZESTORETIC) 20-12.5 MG per tablet Take 1 tablet by mouth Daily. 90 tablet 0   • metFORMIN (GLUCOPHAGE) 1000 MG tablet Take 1 tablet by mouth 2 (Two) Times a Day With Meals. 180 tablet 1   • Omega-3 Fatty Acids (FISH OIL) 1000 MG capsule delayed-release Take  by mouth daily.     • ONETOUCH DELICA LANCETS FINE misc      • rosuvastatin (CRESTOR) 40 MG tablet Take 1 tablet by mouth Daily. 90 tablet 3   • Soliqua 100-33 UNT-MCG/ML solution pen-injector injection Inject 46 Units under the skin into the appropriate area as directed Every Morning. 45 mL 1   • vitamin D (ERGOCALCIFEROL) 1.25 MG (49461 UT) capsule capsule Take 1 capsule by mouth 1 (One) Time Per Week. 13 capsule 3     No current facility-administered medications on file prior to visit.        PFSH:     The following portions of the patient's history were reviewed and updated as appropriate: Allergies / Current Medications / Past Medical History / Surgical History / Social History / Family History    Problem List:  Patient Active Problem List   Diagnosis   • Benign essential hypertension   • Depression   • Type 2 diabetes mellitus with hyperglycemia, with long-term current use of insulin (CMS/[facility])   • Dyslipidemia   • Hypothyroidism   • Vitamin  "D deficiency       Past Medical History:  Past Medical History:   Diagnosis Date   • Hyperlipidemia    • Hypertension    • Hypothyroidism    • Type 2 diabetes mellitus (CMS/HCC)    • Vitamin D deficiency        Past Surgical History:  Past Surgical History:   Procedure Laterality Date   • CHOLECYSTECTOMY     • COLONOSCOPY N/A 9/15/2017    Procedure: COLONOSCOPY;  Surgeon: Brian Puckett MD;  Location: [facility] ENDOSCOPY;  Service:    • EYE SURGERY Left 2015    cataract    • TONSILLECTOMY         Social History:  Social History     Socioeconomic History   • Marital status:      Spouse name: Dallas*   • Number of children: 2   • Years of education: Not on file   • Highest education level: Not on file   Tobacco Use   • Smoking status: Never Smoker   • Smokeless tobacco: Never Used   Substance and Sexual Activity   • Alcohol use: No   • Drug use: No   • Sexual activity: Yes     Partners: Male       Family History:  Family History   Problem Relation Age of Onset   • Lung cancer Mother         smoker   • Cancer Brother         cholangiocarcinoma ( 54)   • Diabetes type II Brother    • Lung cancer Maternal Aunt         Adenocarcinoma (6 sisters)   • Diabetes type II Father          at 82   • Coronary artery disease Father    • No Known Problems Sister    • No Known Problems Sister    • Colon cancer Neg Hx          PATIENT ASSESSMENT     Vitals:  /62 (BP Location: Left arm)   Pulse (!) 127   Temp 96.8 °F (36 °C)   Ht 170.2 cm (67\")   Wt 70.3 kg (155 lb)   SpO2 98%   BMI 24.28 kg/m²   BP Readings from Last 3 Encounters:   21 128/62   21 126/74   21 132/70     Wt Readings from Last 3 Encounters:   21 70.3 kg (155 lb)   21 70.3 kg (155 lb)   21 70.5 kg (155 lb 6.4 oz)      Body mass index is 24.28 kg/m².    There were no vitals filed for this visit.      Review of Systems:    Review of Systems  Constitutional neg except per HPI   Resp neg  CV neg   GI neg   " neg  MuSk neg  Neuro neg  Psych stable     Physical Exam:    Physical Exam  General: No acute distress, alert with normal judgment   Cardiovascular Rate: normal. Rhythm: regular. Heart sounds: normal       Reviewed Data:    Labs:   Lab Results   Component Value Date     03/30/2021    K 4.1 03/30/2021    CALCIUM 9.7 03/30/2021    AST 27 03/30/2021    ALT 28 03/30/2021    BUN 13 03/30/2021    CREATININE 0.69 03/30/2021    CREATININE 0.70 10/06/2020    CREATININE 0.73 02/10/2020    EGFRIFNONA 85 03/30/2021    EGFRIFAFRI 103 03/30/2021       Lab Results   Component Value Date     (H) 03/30/2021     (H) 10/06/2020     (H) 02/10/2020    HGBA1C 7.3 06/07/2021    HGBA1C 7.80 (H) 03/30/2021    HGBA1C 9.00 (H) 10/06/2020    MICROALBUR <3.0 03/30/2021       Lab Results   Component Value Date    LDL 68 03/30/2021    LDL 67 10/06/2020    LDL 91 02/10/2020    HDL 47 03/30/2021    TRIG 135 03/30/2021     No results found for: PROTEIN, GLUCOSEU, BLOODU, NITRITEU, LEUKOCYTESUR   Lab Results   Component Value Date    TSH 0.252 (L) 03/30/2021    FREET4 1.95 (H) 10/06/2020     Lab Results   Component Value Date    VXMN84PC 55.2 03/30/2021    BQUQ87HA 51.3 10/06/2020    QCLN92XW 40.4 02/10/2020         Lab Results   Component Value Date    WBC 6.46 12/14/2017    HGB 14.1 12/14/2017     12/14/2017        Imaging:          Medical Tests:          Screening for Glaucoma:  Previous screening for glaucoma?: Yes      Hearing Loss Screen:  Finger Rub Hearing Test (right ear): passed  Finger Rub Hearing Test (left ear): passed      Urinary Incontinence Screen:  Episodes of urinary incontinence? : No      Depression Screen:  PHQ-2/PHQ-9 Depression Screening 8/11/2021   Little interest or pleasure in doing things 0   Feeling down, depressed, or hopeless 0   Total Score 0        PHQ-2: N/A (Has diagnosis of depression and is on treatment)    PHQ-9: 0 (Negative screening for depression)       FUNCTIONAL, FALL RISK,  & COGNITIVE SCREENING (Components below):    DATA:    Functional & Cognitive Status 8/11/2021   Do you have difficulty preparing food and eating? No   Do you have difficulty bathing yourself, getting dressed or grooming yourself? No   Do you have difficulty using the toilet? No   Do you have difficulty moving around from place to place? No   Do you have trouble with steps or getting out of a bed or a chair? No   Current Diet Well Balanced Diet   Dental Exam Up to date   Eye Exam Up to date   Exercise (times per week) 4 times per week   Current Exercises Include Walking   Do you need help using the phone?  No   Are you deaf or do you have serious difficulty hearing?  No   Do you need help with transportation? No   Do you need help shopping? No   Do you need help preparing meals?  No   Do you need help with housework?  No   Do you need help with laundry? No   Do you need help taking your medications? No   Do you need help managing money? No   Do you ever drive or ride in a car without wearing a seat belt? No   Do you have difficulty concentrating, remembering or making decisions? No         A) Assessment of Functional Ability:  (Assessment of ability to perform ADL's (showering/bathing, using toilet, dressing, feeding self, moving self around) and IADL's (use telephone, shop, prepare food, housekeep, do laundry, transport independently, take medications independently, and handle finances)    Degree of functional impairment: NONE (based on assessment noted above)      B) Assessment of Fall Risk:  Fall Risk Assessment was completed, and patient is at low risk for falls.       Need for further evaluation of gait, strength, and balance? : No    Timed Up and Go (TUG):   (>= 12 seconds indicates high risk for falling)    Observable abnormalities included: Normal gait pattern       C. Assessment of Cognitive Function:    Mini-Cog Test:     1) Registration (5 objects): Yes   2) Number of objects recalled: 5   3) Clock Draw:  Passed? : N/A       Further evaluation required? : No        COUNSELING       A. Identification of Health Risk Factors:    Risk factors include: cardiovascular risk factors      B. Age-Appropriate Screening Schedule:  (Refer to the list below for future screening recommendations based on patient's age, sex and/or medical conditions. Orders for these recommended tests are listed in the plan section. The patient has been provided with a written plan)    Health Maintenance Topics  Health Maintenance   Topic Date Due   • DXA SCAN  Never done   • ZOSTER VACCINE (1 of 2) Never done   • INFLUENZA VACCINE  10/01/2021   • DIABETIC FOOT EXAM  10/13/2021   • HEMOGLOBIN A1C  12/07/2021   • LIPID PANEL  03/30/2022   • URINE MICROALBUMIN  03/30/2022   • DIABETIC EYE EXAM  06/03/2022   • MAMMOGRAM  12/30/2022   • PAP SMEAR  02/01/2024   • TDAP/TD VACCINES (2 - Td or Tdap) 05/26/2027       Health Maintenance Topics Due or Over-Due  Health Maintenance Due   Topic Date Due   • DXA SCAN  Never done   • ZOSTER VACCINE (1 of 2) Never done         C. Advanced Care Planning:    Advance Care Planning   ACP discussion was held with the patient during this visit. Patient has an advance directive (not in EMR), copy requested.       D. Patient Self-Management and Personalized Health Advice:    She has been provided with personalized counseling/information (including brochures/handouts) about:     -- reducing risk for cardiovascular disease (heart, stroke, vascular), mental health concerns (depression/anxiety) and managing depression/anxiety      She has been recommended for the following preventative services which has been performed today, will be ordered today or ordered/performed on upcoming follow-up visit:     -- nutrition counseling provided, exercise counseling provided, counseling for cardiovascular disease risk reduction, fall risk assessment / plan of care completed, urinary incontinence assessment done, screening for osteoporosis  (DEXA ordered), vaccination for Shingrix administered  (or recommended at pharmacy), vaccination for Hepatitis A administered (or recommended at pharmacy)      E. Miscellaneous Items:    -Aspirin use counseling: Does not need ASA (and currently is not on it)    -Discussed BMI with her. The BMI is in the acceptable range    -Reviewed use of high risk medication in the elderly: YES    -Reviewed for potential of harmful drug interactions in the elderly: YES        WRAP UP       Assessment & Plan:    1) MEDICARE ANNUAL WELLNESS VISIT    2) OTHER MEDICAL CONDITIONS ADDRESSED TODAY:            Problem List Items Addressed This Visit        High    Type 2 diabetes mellitus with hyperglycemia, with long-term current use of insulin (CMS/[facility]) (Chronic)    Overview     * Previously managed by endocrinologist (Bartolo). Will not be seeing endocrinology going forward.          Relevant Medications    lisinopril-hydrochlorothiazide (PRINZIDE,ZESTORETIC) 20-12.5 MG per tablet    metFORMIN (GLUCOPHAGE) 1000 MG tablet    Soliqua 100-33 UNT-MCG/ML solution pen-injector injection    glucose blood test strip    vitamin D (ERGOCALCIFEROL) 1.25 MG (37877 UT) capsule capsule    Insulin Pen Needle (ReliOn Pen Needles) 32G X 4 MM misc       Medium    Benign essential hypertension (Chronic)    Overview     Continue lisinopril-hctz 20/12.5 mg qd.          Relevant Medications    lisinopril-hydrochlorothiazide (PRINZIDE,ZESTORETIC) 20-12.5 MG per tablet    vitamin D (ERGOCALCIFEROL) 1.25 MG (25951 UT) capsule capsule    Dyslipidemia (Chronic)    Relevant Medications    lisinopril-hydrochlorothiazide (PRINZIDE,ZESTORETIC) 20-12.5 MG per tablet    vitamin D (ERGOCALCIFEROL) 1.25 MG (82171 UT) capsule capsule    Hypothyroidism (Chronic)    Relevant Medications    levothyroxine (Synthroid) 100 MCG tablet    vitamin D (ERGOCALCIFEROL) 1.25 MG (08408 UT) capsule capsule    Other Relevant Orders    TSH+Free T4       Low    Depression (Chronic)     Overview     Continue citalopram 20 mg qd.          Relevant Medications    citalopram (CeleXA) 20 MG tablet       Unprioritized    Vitamin D deficiency    Relevant Medications    lisinopril-hydrochlorothiazide (PRINZIDE,ZESTORETIC) 20-12.5 MG per tablet    vitamin D (ERGOCALCIFEROL) 1.25 MG (29614 UT) capsule capsule      Other Visit Diagnoses     Medicare annual wellness visit, initial    -  Primary    Type 2 diabetes mellitus without complication, with long-term current use of insulin (CMS/[facility])        Relevant Medications    glucose blood test strip    vitamin D (ERGOCALCIFEROL) 1.25 MG (18174 UT) capsule capsule    Insulin Pen Needle (ReliOn Pen Needles) 32G X 4 MM misc    Other Relevant Orders    Hemoglobin A1c    Candidiasis, intertriginous        Relevant Medications    nystatin (MYCOSTATIN) 354376 UNIT/GM powder    Hemorrhoids, unspecified hemorrhoid type        Relevant Medications    Hydrocortisone, Perianal, (ANUSOL-HC) 2.5 % rectal cream    Encounter for screening for osteoporosis        Relevant Orders    DEXA Bone Density Axial    Postmenopausal state        Relevant Orders    DEXA Bone Density Axial                    Orders Placed This Encounter   Procedures   • DEXA Bone Density Axial   • Hemoglobin A1c   • TSH+Free T4       Discussion / Summary:        Medications as of TODAY:              Current Outpatient Medications   Medication Sig Dispense Refill   • citalopram (CeleXA) 20 MG tablet Take 1 tablet by mouth Daily. 90 tablet 1   • glucose blood test strip OneTouch Verio In Vitro Strip; Patient Sig: CHECK BLOOD GLUCOSE 1 TIME A DAY; E11.65 100 each 3   • Insulin Pen Needle (ReliOn Pen Needles) 32G X 4 MM misc USE 1 PEN NEEDLE ONCE DAILY 100 each 3   • levothyroxine (Synthroid) 100 MCG tablet Take 1 tablet by mouth Daily. 30 tablet 5   • lisinopril-hydrochlorothiazide (PRINZIDE,ZESTORETIC) 20-12.5 MG per tablet Take 1 tablet by mouth Daily. 90 tablet 0   • metFORMIN (GLUCOPHAGE) 1000 MG tablet  Take 1 tablet by mouth 2 (Two) Times a Day With Meals. 180 tablet 1   • Omega-3 Fatty Acids (FISH OIL) 1000 MG capsule delayed-release Take  by mouth daily.     • ONETOUCH DELICA LANCETS FINE misc      • rosuvastatin (CRESTOR) 40 MG tablet Take 1 tablet by mouth Daily. 90 tablet 3   • Soliqua 100-33 UNT-MCG/ML solution pen-injector injection Inject 46 Units under the skin into the appropriate area as directed Every Morning. 45 mL 1   • vitamin D (ERGOCALCIFEROL) 1.25 MG (36273 UT) capsule capsule Take 1 capsule by mouth 1 (One) Time Per Week. 13 capsule 3   • Hydrocortisone, Perianal, (ANUSOL-HC) 2.5 % rectal cream Insert  into the rectum 2 (Two) Times a Day. 28 g 2   • nystatin (MYCOSTATIN) 995322 UNIT/GM powder Apply  topically to the appropriate area as directed 2 (Two) Times a Day. 60 g 3     No current facility-administered medications for this visit.         FOLLOW-UP:            Return in about 6 months (around 2/11/2022) for Reassess chronic medical problems.                 Future Appointments   Date Time Provider Department Center   9/30/2021 12:30 PM [facility]   10/14/2021 12:30 PM Margarita Luis APRN [facility]   2/11/2022  2:00 PM Venkat Brown MD [facility]           After Visit Summary (AVS) including the Personalized Prevention  Plan Services (PPPS) was either printed and given to the patient at check-out today and/or sent to [facility] for review.         *Examiner was wearing KN95 mask and eye protection during the entire duration of the visit. Patient was masked the entire time.   Minimum social distance of 6 ft maintained entire visit except if physical contact was necessary as documented.     **Dragon Disclaimer:   Much of this encounter note is an electronic transcription/translation of spoken language to printed text. The electronic translation of spoken language may permit erroneous, or at times, nonsensical words or phrases to be inadvertently  transcribed. Although I have reviewed the note for such errors, some may still exist.       Template created by Bear Brown MD

## 2021-08-12 DIAGNOSIS — E11.65 TYPE 2 DIABETES MELLITUS WITH HYPERGLYCEMIA, WITH LONG-TERM CURRENT USE OF INSULIN (HCC): ICD-10-CM

## 2021-08-12 DIAGNOSIS — Z79.4 TYPE 2 DIABETES MELLITUS WITH HYPERGLYCEMIA, WITH LONG-TERM CURRENT USE OF INSULIN (HCC): ICD-10-CM

## 2021-08-12 RX ORDER — INSULIN GLARGINE AND LIXISENATIDE 100; 33 U/ML; UG/ML
51 INJECTION, SOLUTION SUBCUTANEOUS EVERY MORNING
Qty: 45 ML | Refills: 1
Start: 2021-08-12 | End: 2021-11-15

## 2021-08-12 NOTE — ASSESSMENT & PLAN NOTE
Lab Results   Component Value Date    HGBA1C 8.30 (H) 08/11/2021    HGBA1C 7.3 06/07/2021    HGBA1C 7.80 (H) 03/30/2021        Increase Soliqua by 5 units daily.

## 2021-08-12 NOTE — PROGRESS NOTES
Call patient with results:    1. A1c level for blood sugar average is notably higher. I suspect she may not be watching diabetic diet as closely.   - increase Soliqua injection by 5 units daily.   - watch diet much more closely.   - see me in 3 months for diabetes with POCT A1c.     2. Thyroid is stable for now. Continue same dose for now. Instruct patient on proper administration of thyroid medication. Do not take medication within 4 hours of thyroid labs.

## 2021-10-29 DIAGNOSIS — E03.9 HYPOTHYROIDISM, UNSPECIFIED TYPE: Primary | ICD-10-CM

## 2021-10-29 RX ORDER — LEVOTHYROXINE SODIUM 0.1 MG/1
100 TABLET ORAL DAILY
Qty: 30 TABLET | Refills: 11 | Status: SHIPPED | OUTPATIENT
Start: 2021-10-29 | End: 2022-12-19

## 2021-11-15 ENCOUNTER — OFFICE VISIT (OUTPATIENT)
Dept: INTERNAL MEDICINE | Age: 66
End: 2021-11-15

## 2021-11-15 VITALS
TEMPERATURE: 96.9 F | WEIGHT: 152 LBS | BODY MASS INDEX: 23.86 KG/M2 | HEART RATE: 109 BPM | SYSTOLIC BLOOD PRESSURE: 134 MMHG | HEIGHT: 67 IN | DIASTOLIC BLOOD PRESSURE: 70 MMHG | OXYGEN SATURATION: 98 %

## 2021-11-15 DIAGNOSIS — E11.9 CONTROLLED TYPE 2 DIABETES MELLITUS WITHOUT COMPLICATION (HCC): ICD-10-CM

## 2021-11-15 DIAGNOSIS — I10 BENIGN ESSENTIAL HYPERTENSION: ICD-10-CM

## 2021-11-15 DIAGNOSIS — F32.A DEPRESSION, UNSPECIFIED DEPRESSION TYPE: Chronic | ICD-10-CM

## 2021-11-15 DIAGNOSIS — E03.8 HYPOTHYROIDISM DUE TO HASHIMOTO'S THYROIDITIS: Chronic | ICD-10-CM

## 2021-11-15 DIAGNOSIS — E06.3 HYPOTHYROIDISM DUE TO HASHIMOTO'S THYROIDITIS: Chronic | ICD-10-CM

## 2021-11-15 DIAGNOSIS — E78.5 DYSLIPIDEMIA: Chronic | ICD-10-CM

## 2021-11-15 DIAGNOSIS — I10 BENIGN ESSENTIAL HYPERTENSION: Chronic | ICD-10-CM

## 2021-11-15 DIAGNOSIS — E55.9 VITAMIN D DEFICIENCY: ICD-10-CM

## 2021-11-15 DIAGNOSIS — E11.65 TYPE 2 DIABETES MELLITUS WITH HYPERGLYCEMIA, WITH LONG-TERM CURRENT USE OF INSULIN (HCC): Primary | ICD-10-CM

## 2021-11-15 DIAGNOSIS — Z79.4 TYPE 2 DIABETES MELLITUS WITH HYPERGLYCEMIA, WITH LONG-TERM CURRENT USE OF INSULIN (HCC): Primary | ICD-10-CM

## 2021-11-15 DIAGNOSIS — E78.5 DYSLIPIDEMIA: ICD-10-CM

## 2021-11-15 LAB
EXPIRATION DATE: NORMAL
HBA1C MFR BLD: 7.8 %
Lab: NORMAL

## 2021-11-15 PROCEDURE — 83036 HEMOGLOBIN GLYCOSYLATED A1C: CPT | Performed by: INTERNAL MEDICINE

## 2021-11-15 PROCEDURE — 3051F HG A1C>EQUAL 7.0%<8.0%: CPT | Performed by: INTERNAL MEDICINE

## 2021-11-15 PROCEDURE — 99214 OFFICE O/P EST MOD 30 MIN: CPT | Performed by: INTERNAL MEDICINE

## 2021-11-15 RX ORDER — INSULIN GLARGINE AND LIXISENATIDE 100; 33 U/ML; UG/ML
55 INJECTION, SOLUTION SUBCUTANEOUS EVERY MORNING
Start: 2021-11-15 | End: 2022-01-24 | Stop reason: SDUPTHER

## 2021-11-15 RX ORDER — LISINOPRIL AND HYDROCHLOROTHIAZIDE 20; 12.5 MG/1; MG/1
TABLET ORAL
Qty: 90 TABLET | Refills: 3 | Status: SHIPPED | OUTPATIENT
Start: 2021-11-15 | End: 2023-03-31 | Stop reason: SDUPTHER

## 2021-11-15 NOTE — ASSESSMENT & PLAN NOTE
Lab Results   Component Value Date    TSH 2.200 08/11/2021    TSH 0.252 (L) 03/30/2021    TSH 0.892 10/06/2020    FREET4 1.63 08/11/2021    FREET4 1.95 (H) 10/06/2020    FREET4 1.83 (H) 02/10/2020        Continue levothyroxine 100 mcg qAM.

## 2021-11-15 NOTE — ASSESSMENT & PLAN NOTE
Lab Results   Component Value Date    HGBA1C 7.8 11/15/2021    HGBA1C 8.30 (H) 08/11/2021    HGBA1C 7.3 06/07/2021        Increase Soliqua to 55 units qd (Max dose is 60 units).   Can improve her diet quite a bit (fruits/starch).   Has follow-up in 2/2022.

## 2021-11-15 NOTE — PROGRESS NOTES
I N T E R N A L  M E D I C I N E  J U N O H  K I M,  M D      ENCOUNTER DATE:  11/15/2021    Magui Dumont / 66 y.o. / female      CHIEF COMPLAINT / REASON FOR OFFICE VISIT     Diabetes      ASSESSMENT & PLAN     Problem List Items Addressed This Visit        High    Type 2 diabetes mellitus with hyperglycemia, with long-term current use of insulin (HCC) - Primary (Chronic)    Overview     * Previously managed by endocrinologist (Bartolo). Will not be seeing endocrinology going forward.          Current Assessment & Plan     Lab Results   Component Value Date    HGBA1C 7.8 11/15/2021    HGBA1C 8.30 (H) 08/11/2021    HGBA1C 7.3 06/07/2021        Increase Soliqua to 55 units qd (Max dose is 60 units).   Can improve her diet quite a bit (fruits/starch).   Has follow-up in 2/2022.          Relevant Medications    lisinopril-hydrochlorothiazide (PRINZIDE,ZESTORETIC) 20-12.5 MG per tablet    glucose blood test strip    vitamin D (ERGOCALCIFEROL) 1.25 MG (69245 UT) capsule capsule    Insulin Pen Needle (ReliOn Pen Needles) 32G X 4 MM misc    Soliqua 100-33 UNT-MCG/ML solution pen-injector injection    metFORMIN (GLUCOPHAGE) 1000 MG tablet    Other Relevant Orders    POC Glycosylated Hemoglobin (Hb A1C) (Completed)    Comprehensive Metabolic Panel    CBC & Differential       Medium    Benign essential hypertension (Chronic)    Overview     Continue lisinopril-hctz 20/12.5 mg qd.          Relevant Medications    lisinopril-hydrochlorothiazide (PRINZIDE,ZESTORETIC) 20-12.5 MG per tablet    vitamin D (ERGOCALCIFEROL) 1.25 MG (19069 UT) capsule capsule    Dyslipidemia (Chronic)    Overview     On rosuvastatin 40 mg qHS          Relevant Medications    lisinopril-hydrochlorothiazide (PRINZIDE,ZESTORETIC) 20-12.5 MG per tablet    vitamin D (ERGOCALCIFEROL) 1.25 MG (77108 UT) capsule capsule    Hypothyroidism (Chronic)    Current Assessment & Plan     Lab Results   Component Value Date    TSH 2.200 08/11/2021    TSH 0.252 (L)  "03/30/2021    TSH 0.892 10/06/2020    FREET4 1.63 08/11/2021    FREET4 1.95 (H) 10/06/2020    FREET4 1.83 (H) 02/10/2020        Continue levothyroxine 100 mcg qAM.          Relevant Medications    vitamin D (ERGOCALCIFEROL) 1.25 MG (60428 UT) capsule capsule    levothyroxine (Synthroid) 100 MCG tablet       Low    Depression (Chronic)    Overview     Continue citalopram 20 mg qd.          Relevant Medications    citalopram (CeleXA) 20 MG tablet        Orders Placed This Encounter   Procedures   • Comprehensive Metabolic Panel   • POC Glycosylated Hemoglobin (Hb A1C)   • CBC & Differential     New Medications Ordered This Visit   Medications   • Soliqua 100-33 UNT-MCG/ML solution pen-injector injection     Sig: Inject 55 Units under the skin into the appropriate area as directed Every Morning.   • metFORMIN (GLUCOPHAGE) 1000 MG tablet     Sig: Take 1 tablet by mouth 2 (Two) Times a Day With Meals.     Dispense:  180 tablet     Refill:  3       SUMMARY/DISCUSSION  •       Next Appointment with me: 2/11/2022    Return for Next scheduled follow up.        VITAL SIGNS     Visit Vitals  /70 (BP Location: Left arm)   Pulse 109   Temp 96.9 °F (36.1 °C)   Ht 170.2 cm (67\")   Wt 68.9 kg (152 lb)   SpO2 98%   BMI 23.81 kg/m²       BP Readings from Last 3 Encounters:   11/15/21 134/70   08/11/21 128/62   06/07/21 126/74     Wt Readings from Last 3 Encounters:   11/15/21 68.9 kg (152 lb)   08/11/21 70.3 kg (155 lb)   06/07/21 70.3 kg (155 lb)     Body mass index is 23.81 kg/m².      MEDICATIONS AT THE TIME OF OFFICE VISIT     Current Outpatient Medications on File Prior to Visit   Medication Sig   • citalopram (CeleXA) 20 MG tablet Take 1 tablet by mouth Daily.   • glucose blood test strip OneTouch Verio In Vitro Strip; Patient Sig: CHECK BLOOD GLUCOSE 1 TIME A DAY; E11.65   • Hydrocortisone, Perianal, (ANUSOL-HC) 2.5 % rectal cream Insert  into the rectum 2 (Two) Times a Day.   • Insulin Pen Needle (ReliOn Pen Needles) " 32G X 4 MM misc USE 1 PEN NEEDLE ONCE DAILY   • levothyroxine (Synthroid) 100 MCG tablet Take 1 tablet by mouth Daily.   • lisinopril-hydrochlorothiazide (PRINZIDE,ZESTORETIC) 20-12.5 MG per tablet Take 1 tablet by mouth Daily.   • nystatin (MYCOSTATIN) 974497 UNIT/GM powder Apply  topically to the appropriate area as directed 2 (Two) Times a Day.   • Omega-3 Fatty Acids (FISH OIL) 1000 MG capsule delayed-release Take  by mouth daily.   • ONETOUCH DELICA LANCETS FINE misc    • rosuvastatin (CRESTOR) 40 MG tablet Take 1 tablet by mouth Daily.   • vitamin D (ERGOCALCIFEROL) 1.25 MG (43666 UT) capsule capsule Take 1 capsule by mouth 1 (One) Time Per Week.   • [DISCONTINUED] metFORMIN (GLUCOPHAGE) 1000 MG tablet Take 1 tablet by mouth 2 (Two) Times a Day With Meals.   • [DISCONTINUED] Soliqua 100-33 UNT-MCG/ML solution pen-injector injection Inject 51 Units under the skin into the appropriate area as directed Every Morning.     No current facility-administered medications on file prior to visit.         HISTORY OF PRESENT ILLNESS     A1c level today is somewhat improved at 7.8, previously 8.3.  Previously increase Soliqua to 51 units daily.  She reports to consuming a lot of fruit and starches.  Hypertension remains controlled along with hyperlipidemia and hypothyroidism.  Depression stable on citalopram.    Lab Results   Component Value Date    HGBA1C 7.8 11/15/2021    HGBA1C 8.30 (H) 08/11/2021    HGBA1C 7.3 06/07/2021    CREATININE 0.69 03/30/2021    LDL 68 03/30/2021    MICROALBUR <3.0 03/30/2021      Wt Readings from Last 3 Encounters:   11/15/21 68.9 kg (152 lb)   08/11/21 70.3 kg (155 lb)   06/07/21 70.3 kg (155 lb)         REVIEW OF SYSTEMS           PHYSICAL EXAMINATION     Physical Exam  General: No acute distress  Psych: Normal thought and judgment   Cardiovascular Rate: normal. Rhythm: regular. Heart sounds: normal.   Pulm/Chest: Effort normal, breath sounds normal.       REVIEWED DATA     Labs:       Lab  Results   Component Value Date     03/30/2021    K 4.1 03/30/2021    CALCIUM 9.7 03/30/2021    AST 27 03/30/2021    ALT 28 03/30/2021    BUN 13 03/30/2021    CREATININE 0.69 03/30/2021    CREATININE 0.70 10/06/2020    CREATININE 0.73 02/10/2020    EGFRIFNONA 85 03/30/2021    EGFRIFAFRI 103 03/30/2021       Lab Results   Component Value Date    HGBA1C 7.8 11/15/2021    HGBA1C 8.30 (H) 08/11/2021    HGBA1C 7.3 06/07/2021       Lab Results   Component Value Date    LDL 68 03/30/2021    LDL 67 10/06/2020    HDL 47 03/30/2021    TRIG 135 03/30/2021       Lab Results   Component Value Date    TSH 2.200 08/11/2021    TSH 0.252 (L) 03/30/2021    TSH 0.892 10/06/2020    FREET4 1.63 08/11/2021    FREET4 1.95 (H) 10/06/2020    FREET4 1.83 (H) 02/10/2020       Lab Results   Component Value Date    WBC 6.46 12/14/2017    HGB 14.1 12/14/2017     12/14/2017       Lab Results   Component Value Date    MICROALBUR <3.0 03/30/2021           Imaging:           Medical Tests:           Summary of old records / correspondence / consultant report:           Request outside records:           *Examiner was wearing KN95 mask and eye protection during the entire duration of the visit. Patient was masked the entire time. Minimum social distance of 6 ft maintained entire visit except if physical contact was necessary as documented.     **Dragon Disclaimer: Much of this encounter note is an electronic transcription/translation of spoken language to printed text. The electronic translation of spoken language may permit erroneous, or at times, nonsensical words or phrases to be inadvertently transcribed. Although I have reviewed the note for such errors, some may still exist.       Template created by Bear Brown MD

## 2021-11-16 LAB
ALBUMIN SERPL-MCNC: 5.2 G/DL (ref 3.8–4.8)
ALBUMIN/GLOB SERPL: 2.5 {RATIO} (ref 1.2–2.2)
ALP SERPL-CCNC: 76 IU/L (ref 44–121)
ALT SERPL-CCNC: 26 IU/L (ref 0–32)
AST SERPL-CCNC: 25 IU/L (ref 0–40)
BASOPHILS # BLD AUTO: 0 X10E3/UL (ref 0–0.2)
BASOPHILS NFR BLD AUTO: 1 %
BILIRUB SERPL-MCNC: 1.2 MG/DL (ref 0–1.2)
BUN SERPL-MCNC: 12 MG/DL (ref 8–27)
BUN/CREAT SERPL: 15 (ref 12–28)
CALCIUM SERPL-MCNC: 10.3 MG/DL (ref 8.7–10.3)
CHLORIDE SERPL-SCNC: 100 MMOL/L (ref 96–106)
CO2 SERPL-SCNC: 25 MMOL/L (ref 20–29)
CREAT SERPL-MCNC: 0.79 MG/DL (ref 0.57–1)
EOSINOPHIL # BLD AUTO: 0.2 X10E3/UL (ref 0–0.4)
EOSINOPHIL NFR BLD AUTO: 2 %
ERYTHROCYTE [DISTWIDTH] IN BLOOD BY AUTOMATED COUNT: 12.7 % (ref 11.7–15.4)
GLOBULIN SER CALC-MCNC: 2.1 G/DL (ref 1.5–4.5)
GLUCOSE SERPL-MCNC: 85 MG/DL (ref 65–99)
HCT VFR BLD AUTO: 43.2 % (ref 34–46.6)
HGB BLD-MCNC: 14.8 G/DL (ref 11.1–15.9)
IMM GRANULOCYTES # BLD AUTO: 0 X10E3/UL (ref 0–0.1)
IMM GRANULOCYTES NFR BLD AUTO: 0 %
LYMPHOCYTES # BLD AUTO: 2.5 X10E3/UL (ref 0.7–3.1)
LYMPHOCYTES NFR BLD AUTO: 31 %
MCH RBC QN AUTO: 30.6 PG (ref 26.6–33)
MCHC RBC AUTO-ENTMCNC: 34.3 G/DL (ref 31.5–35.7)
MCV RBC AUTO: 89 FL (ref 79–97)
MONOCYTES # BLD AUTO: 0.5 X10E3/UL (ref 0.1–0.9)
MONOCYTES NFR BLD AUTO: 6 %
NEUTROPHILS # BLD AUTO: 4.8 X10E3/UL (ref 1.4–7)
NEUTROPHILS NFR BLD AUTO: 60 %
PLATELET # BLD AUTO: 215 X10E3/UL (ref 150–450)
POTASSIUM SERPL-SCNC: 4.1 MMOL/L (ref 3.5–5.2)
PROT SERPL-MCNC: 7.3 G/DL (ref 6–8.5)
RBC # BLD AUTO: 4.83 X10E6/UL (ref 3.77–5.28)
SODIUM SERPL-SCNC: 140 MMOL/L (ref 134–144)
WBC # BLD AUTO: 7.9 X10E3/UL (ref 3.4–10.8)

## 2022-01-10 ENCOUNTER — APPOINTMENT (OUTPATIENT)
Dept: WOMENS IMAGING | Facility: HOSPITAL | Age: 67
End: 2022-01-10

## 2022-01-10 PROCEDURE — 77063 BREAST TOMOSYNTHESIS BI: CPT | Performed by: RADIOLOGY

## 2022-01-10 PROCEDURE — 77067 SCR MAMMO BI INCL CAD: CPT | Performed by: RADIOLOGY

## 2022-01-24 DIAGNOSIS — E11.65 TYPE 2 DIABETES MELLITUS WITH HYPERGLYCEMIA, WITH LONG-TERM CURRENT USE OF INSULIN: ICD-10-CM

## 2022-01-24 DIAGNOSIS — Z79.4 TYPE 2 DIABETES MELLITUS WITH HYPERGLYCEMIA, WITH LONG-TERM CURRENT USE OF INSULIN: ICD-10-CM

## 2022-01-24 RX ORDER — INSULIN GLARGINE AND LIXISENATIDE 100; 33 U/ML; UG/ML
55 INJECTION, SOLUTION SUBCUTANEOUS EVERY MORNING
Qty: 3 PEN | Refills: 5 | Status: SHIPPED | OUTPATIENT
Start: 2022-01-24 | End: 2022-07-20

## 2022-02-01 ENCOUNTER — APPOINTMENT (OUTPATIENT)
Dept: BONE DENSITY | Facility: HOSPITAL | Age: 67
End: 2022-02-01

## 2022-04-29 DIAGNOSIS — B37.2 CANDIDIASIS, INTERTRIGINOUS: ICD-10-CM

## 2022-05-02 RX ORDER — NYSTATIN 100000 [USP'U]/G
POWDER TOPICAL
Qty: 60 G | Refills: 2 | Status: SHIPPED | OUTPATIENT
Start: 2022-05-02

## 2022-05-16 ENCOUNTER — OFFICE VISIT (OUTPATIENT)
Dept: INTERNAL MEDICINE | Age: 67
End: 2022-05-16

## 2022-05-16 VITALS
SYSTOLIC BLOOD PRESSURE: 134 MMHG | TEMPERATURE: 97.3 F | OXYGEN SATURATION: 98 % | HEIGHT: 67 IN | WEIGHT: 152 LBS | HEART RATE: 116 BPM | DIASTOLIC BLOOD PRESSURE: 82 MMHG | BODY MASS INDEX: 23.86 KG/M2

## 2022-05-16 DIAGNOSIS — E11.65 TYPE 2 DIABETES MELLITUS WITH HYPERGLYCEMIA, WITH LONG-TERM CURRENT USE OF INSULIN: Chronic | ICD-10-CM

## 2022-05-16 DIAGNOSIS — Z79.4 TYPE 2 DIABETES MELLITUS WITH HYPERGLYCEMIA, WITH LONG-TERM CURRENT USE OF INSULIN: Chronic | ICD-10-CM

## 2022-05-16 DIAGNOSIS — R31.0 GROSS HEMATURIA: Primary | ICD-10-CM

## 2022-05-16 LAB
BILIRUB BLD-MCNC: NEGATIVE MG/DL
CLARITY, POC: CLEAR
COLOR UR: YELLOW
EXPIRATION DATE: ABNORMAL
GLUCOSE UR STRIP-MCNC: ABNORMAL MG/DL
KETONES UR QL: ABNORMAL
LEUKOCYTE EST, POC: NEGATIVE
Lab: ABNORMAL
NITRITE UR-MCNC: NEGATIVE MG/ML
PH UR: 5 [PH] (ref 5–8)
PROT UR STRIP-MCNC: NEGATIVE MG/DL
RBC # UR STRIP: NEGATIVE /UL
SP GR UR: ABNORMAL
UROBILINOGEN UR QL: NORMAL

## 2022-05-16 PROCEDURE — 99214 OFFICE O/P EST MOD 30 MIN: CPT | Performed by: INTERNAL MEDICINE

## 2022-05-16 PROCEDURE — 81003 URINALYSIS AUTO W/O SCOPE: CPT | Performed by: INTERNAL MEDICINE

## 2022-05-16 NOTE — PROGRESS NOTES
I N T E R N A L  M E D I C I N E  J U N O H  K I M,  M D      ENCOUNTER DATE:  05/16/2022    Magui Dumont / 66 y.o. / female      CHIEF COMPLAINT / REASON FOR OFFICE VISIT     Blood in Urine (Off and on over 6 weeks )      ASSESSMENT & PLAN     Problem List Items Addressed This Visit        High    Gross hematuria - Primary    Current Assessment & Plan     Recurrent episodes of painless, mild gross hematuria without evidence of infection.   Will refer to urology for cystoscopy and CT.     Check labs today.     Office Visit on 05/16/2022   Component Date Value Ref Range Status   • Color 05/16/2022 Yellow  Yellow, Straw, Dark Yellow, Sonia Final   • Clarity, UA 05/16/2022 Clear  Clear Final   • Specific Gravity  05/16/2022    Final    <=1.005   • pH, Urine 05/16/2022 5.0  5.0 - 8.0 Final   • Leukocytes 05/16/2022 Negative  Negative Final   • Nitrite, UA 05/16/2022 Negative  Negative Final   • Protein, POC 05/16/2022 Negative  Negative mg/dL Final   • Glucose, UA 05/16/2022 >=1000 mg/dL (3+) (A) Negative, 1000 mg/dL (3+) mg/dL Final   • Ketones, UA 05/16/2022 Trace (A) Negative Final   • Urobilinogen, UA 05/16/2022 Normal  Normal Final   • Bilirubin 05/16/2022 Negative  Negative Final   • Blood, UA 05/16/2022 Negative  Negative Final   • Lot Number 05/16/2022 [id number]   Final   • Expiration Date 05/16/2022 12/31/22   Final               Relevant Orders    Ambulatory Referral to Gynecologic Urology (Completed)    Basic Metabolic Panel    CBC & Differential    Urinalysis With Culture If Indicated - Urine, Clean Catch    POC Urinalysis Dipstick, Automated (Completed)       Medium    Type 2 diabetes mellitus with hyperglycemia, with long-term current use of insulin (HCC) (Chronic)    Overview     * Previously managed by endocrinologist (Bartolo). Will not be seeing endocrinology going forward.            Current Assessment & Plan     Urine glucose is > 1000. Check serum glucose and A1c today.   She has missed her insulin  "doses for a week.            Relevant Medications    glucose blood test strip    vitamin D (ERGOCALCIFEROL) 1.25 MG (05286 UT) capsule capsule    Insulin Pen Needle (ReliOn Pen Needles) 32G X 4 MM misc    metFORMIN (GLUCOPHAGE) 1000 MG tablet    lisinopril-hydrochlorothiazide (PRINZIDE,ZESTORETIC) 20-12.5 MG per tablet    Soliqua 100-33 UNT-MCG/ML solution pen-injector injection    Other Relevant Orders    Hemoglobin A1c        Orders Placed This Encounter   Procedures   • Basic Metabolic Panel   • Hemoglobin A1c   • Urinalysis With Culture If Indicated - Urine, Clean Catch   • Ambulatory Referral to Gynecologic Urology   • POC Urinalysis Dipstick, Automated   • CBC & Differential     No orders of the defined types were placed in this encounter.      SUMMARY/DISCUSSION  •       Next Appointment with me: 7/20/2022    Return for Next scheduled follow up.      VITAL SIGNS     Visit Vitals  /82 (BP Location: Left arm)   Pulse 116   Temp 97.3 °F (36.3 °C)   Ht 170.2 cm (67\")   Wt 68.9 kg (152 lb)   SpO2 98%   BMI 23.81 kg/m²       BP Readings from Last 3 Encounters:   05/16/22 134/82   11/15/21 134/70   08/11/21 128/62     Wt Readings from Last 3 Encounters:   05/16/22 68.9 kg (152 lb)   11/15/21 68.9 kg (152 lb)   08/11/21 70.3 kg (155 lb)     Body mass index is 23.81 kg/m².    Blood pressure readings recorded on patient flowsheet:  No flowsheet data found.       MEDICATIONS AT THE TIME OF OFFICE VISIT     Current Outpatient Medications on File Prior to Visit   Medication Sig   • citalopram (CeleXA) 20 MG tablet Take 1 tablet by mouth Daily.   • glucose blood test strip OneTouch Verio In Vitro Strip; Patient Sig: CHECK BLOOD GLUCOSE 1 TIME A DAY; E11.65   • Hydrocortisone, Perianal, (ANUSOL-HC) 2.5 % rectal cream Insert  into the rectum 2 (Two) Times a Day.   • Insulin Pen Needle (ReliOn Pen Needles) 32G X 4 MM misc USE 1 PEN NEEDLE ONCE DAILY   • levothyroxine (Synthroid) 100 MCG tablet Take 1 tablet by mouth " Daily.   • lisinopril-hydrochlorothiazide (PRINZIDE,ZESTORETIC) 20-12.5 MG per tablet Take 1 tablet by mouth once daily   • metFORMIN (GLUCOPHAGE) 1000 MG tablet Take 1 tablet by mouth 2 (Two) Times a Day With Meals.   • nystatin 151704 UNIT/GM powder APPLY  POWDER TOPICALLY TO AFFECTED AREA AS DIRECTED TWICE DAILY   • Omega-3 Fatty Acids (FISH OIL) 1000 MG capsule delayed-release Take  by mouth daily.   • ONETOUCH DELICA LANCETS FINE misc    • rosuvastatin (CRESTOR) 40 MG tablet Take 1 tablet by mouth Daily.   • Soliqua 100-33 UNT-MCG/ML solution pen-injector injection Inject 55 Units under the skin into the appropriate area as directed Every Morning.   • vitamin D (ERGOCALCIFEROL) 1.25 MG (94632 UT) capsule capsule Take 1 capsule by mouth 1 (One) Time Per Week.     No current facility-administered medications on file prior to visit.          HISTORY OF PRESENT ILLNESS     Reports seeing blood in the toilet about 6 weeks ago but at that time she was not sure whether the blood was from her stool, vagina or urine.  Yesterday and again today she noticed small amounts of gross hematuria with urination.  She denies any noticeable vaginal bleeding or any blood associated with bowel movement.  She denies any fever, dysuria, urinary urgency or frequency.  She denies any discomfort or pain with sexual intercourse or difficulty with bowel movements.  She denies previous history of kidney stones.      Patient Care Team:  Venkat Brown MD as PCP - General (Internal Medicine)  Harsh Velasco MD as Consulting Physician (Obstetrics and Gynecology)    REVIEW OF SYSTEMS     Review of Systems   Constitutional neg except per HPI   Resp neg  CV neg   GI negative   negative for vaginal bleeding or discomfort with intercourse   MuSk some lower back pain which seems more MuSk   No bruising or bleeding elsewhere     PHYSICAL EXAMINATION     Physical Exam  General: No acute distress  Psych: Normal thought and judgment   MuSk: mild  lower back tenderness   No CVA tenderness to palpation       REVIEWED DATA     Labs:     Office Visit on 05/16/2022   Component Date Value Ref Range Status   • Color 05/16/2022 Yellow  Yellow, Straw, Dark Yellow, Sonia Final   • Clarity, UA 05/16/2022 Clear  Clear Final   • Specific Gravity  05/16/2022    Final    <=1.005   • pH, Urine 05/16/2022 5.0  5.0 - 8.0 Final   • Leukocytes 05/16/2022 Negative  Negative Final   • Nitrite, UA 05/16/2022 Negative  Negative Final   • Protein, POC 05/16/2022 Negative  Negative mg/dL Final   • Glucose, UA 05/16/2022 >=1000 mg/dL (3+) (A) Negative, 1000 mg/dL (3+) mg/dL Final   • Ketones, UA 05/16/2022 Trace (A) Negative Final   • Urobilinogen, UA 05/16/2022 Normal  Normal Final   • Bilirubin 05/16/2022 Negative  Negative Final   • Blood, UA 05/16/2022 Negative  Negative Final   • Lot Number 05/16/2022 [id number]   Final   • Expiration Date 05/16/2022 12/31/22   Final      Lab Results   Component Value Date     11/15/2021    K 4.1 11/15/2021    CALCIUM 10.3 11/15/2021    AST 25 11/15/2021    ALT 26 11/15/2021    BUN 12 11/15/2021    CREATININE 0.79 11/15/2021    CREATININE 0.69 03/30/2021    CREATININE 0.70 10/06/2020    EGFRIFNONA 78 11/15/2021    EGFRIFAFRI 90 11/15/2021       Lab Results   Component Value Date    HGBA1C 7.8 11/15/2021    HGBA1C 8.30 (H) 08/11/2021    HGBA1C 7.3 06/07/2021       Lab Results   Component Value Date    LDL 68 03/30/2021    LDL 67 10/06/2020    HDL 47 03/30/2021    TRIG 135 03/30/2021       Lab Results   Component Value Date    TSH 2.200 08/11/2021    TSH 0.252 (L) 03/30/2021    TSH 0.892 10/06/2020    FREET4 1.63 08/11/2021    FREET4 1.95 (H) 10/06/2020    FREET4 1.83 (H) 02/10/2020       Lab Results   Component Value Date    WBC 7.9 11/15/2021    HGB 14.8 11/15/2021     11/15/2021       Lab Results   Component Value Date    MICROALBUR <3.0 03/30/2021           Imaging:           Medical Tests:           Summary of old records /  correspondence / consultant report:           Request outside records:             *Examiner was wearing KN95 mask and eye protection during the entire duration of the visit. Patient was masked the entire time. Minimum social distance of 6 ft maintained entire visit except if physical contact was necessary as documented.       Template created by Bear Brown MD

## 2022-05-16 NOTE — ASSESSMENT & PLAN NOTE
Recurrent episodes of painless, mild gross hematuria without evidence of infection.   Will refer to urology for cystoscopy and CT.     Check labs today.     Office Visit on 05/16/2022   Component Date Value Ref Range Status   • Color 05/16/2022 Yellow  Yellow, Straw, Dark Yellow, Sonia Final   • Clarity, UA 05/16/2022 Clear  Clear Final   • Specific Gravity  05/16/2022    Final    <=1.005   • pH, Urine 05/16/2022 5.0  5.0 - 8.0 Final   • Leukocytes 05/16/2022 Negative  Negative Final   • Nitrite, UA 05/16/2022 Negative  Negative Final   • Protein, POC 05/16/2022 Negative  Negative mg/dL Final   • Glucose, UA 05/16/2022 >=1000 mg/dL (3+) (A) Negative, 1000 mg/dL (3+) mg/dL Final   • Ketones, UA 05/16/2022 Trace (A) Negative Final   • Urobilinogen, UA 05/16/2022 Normal  Normal Final   • Bilirubin 05/16/2022 Negative  Negative Final   • Blood, UA 05/16/2022 Negative  Negative Final   • Lot Number 05/16/2022 [id number]   Final   • Expiration Date 05/16/2022 12/31/22   Final

## 2022-05-16 NOTE — ASSESSMENT & PLAN NOTE
Urine glucose is > 1000. Check serum glucose and A1c today.   She has missed her insulin doses for a week.

## 2022-05-17 LAB
APPEARANCE UR: CLEAR
BACTERIA #/AREA URNS HPF: NORMAL /[HPF]
BASOPHILS # BLD AUTO: 0 X10E3/UL (ref 0–0.2)
BASOPHILS NFR BLD AUTO: 1 %
BILIRUB UR QL STRIP: NEGATIVE
BUN SERPL-MCNC: 9 MG/DL (ref 8–27)
BUN/CREAT SERPL: 12 (ref 12–28)
CALCIUM SERPL-MCNC: 10 MG/DL (ref 8.7–10.3)
CASTS URNS QL MICRO: NORMAL /LPF
CHLORIDE SERPL-SCNC: 101 MMOL/L (ref 96–106)
CO2 SERPL-SCNC: 22 MMOL/L (ref 20–29)
COLOR UR: YELLOW
CREAT SERPL-MCNC: 0.77 MG/DL (ref 0.57–1)
EGFRCR SERPLBLD CKD-EPI 2021: 84 ML/MIN/1.73
EOSINOPHIL # BLD AUTO: 0.1 X10E3/UL (ref 0–0.4)
EOSINOPHIL NFR BLD AUTO: 1 %
EPI CELLS #/AREA URNS HPF: NORMAL /HPF (ref 0–10)
ERYTHROCYTE [DISTWIDTH] IN BLOOD BY AUTOMATED COUNT: 12 % (ref 11.7–15.4)
GLUCOSE SERPL-MCNC: 297 MG/DL (ref 65–99)
GLUCOSE UR QL STRIP: ABNORMAL
HBA1C MFR BLD: 9.6 % (ref 4.8–5.6)
HCT VFR BLD AUTO: 43 % (ref 34–46.6)
HGB BLD-MCNC: 14.5 G/DL (ref 11.1–15.9)
HGB UR QL STRIP: NEGATIVE
IMM GRANULOCYTES # BLD AUTO: 0 X10E3/UL (ref 0–0.1)
IMM GRANULOCYTES NFR BLD AUTO: 0 %
KETONES UR QL STRIP: NEGATIVE
LEUKOCYTE ESTERASE UR QL STRIP: NEGATIVE
LYMPHOCYTES # BLD AUTO: 1.6 X10E3/UL (ref 0.7–3.1)
LYMPHOCYTES NFR BLD AUTO: 23 %
MCH RBC QN AUTO: 30.7 PG (ref 26.6–33)
MCHC RBC AUTO-ENTMCNC: 33.7 G/DL (ref 31.5–35.7)
MCV RBC AUTO: 91 FL (ref 79–97)
MICRO URNS: ABNORMAL
MICRO URNS: ABNORMAL
MONOCYTES # BLD AUTO: 0.3 X10E3/UL (ref 0.1–0.9)
MONOCYTES NFR BLD AUTO: 4 %
NEUTROPHILS # BLD AUTO: 5 X10E3/UL (ref 1.4–7)
NEUTROPHILS NFR BLD AUTO: 71 %
NITRITE UR QL STRIP: NEGATIVE
PH UR STRIP: 6 [PH] (ref 5–7.5)
PLATELET # BLD AUTO: 219 X10E3/UL (ref 150–450)
POTASSIUM SERPL-SCNC: 4.4 MMOL/L (ref 3.5–5.2)
PROT UR QL STRIP: NEGATIVE
RBC # BLD AUTO: 4.73 X10E6/UL (ref 3.77–5.28)
RBC #/AREA URNS HPF: NORMAL /HPF (ref 0–2)
SODIUM SERPL-SCNC: 139 MMOL/L (ref 134–144)
SP GR UR STRIP: 1.01 (ref 1–1.03)
URINALYSIS REFLEX: ABNORMAL
UROBILINOGEN UR STRIP-MCNC: 0.2 MG/DL (ref 0.2–1)
WBC # BLD AUTO: 7 X10E3/UL (ref 3.4–10.8)
WBC #/AREA URNS HPF: NORMAL /HPF (ref 0–5)

## 2022-05-17 NOTE — PROGRESS NOTES
CALL PATIENT WITH TEST RESULTS:  - Be sure to communicate DIRECTLY with the patient/surrogate EVEN IF the result(s) has been released or viewed by the patient on [employer]) .  - Also, mail the results IF [employer] is NOT active.      A1c level is significantly higher (9.6) and glucose level (296) is quite high.   I would suggest switching off Soliqua to a different regimen.   Have her see us within 1-2 weeks to discuss best option.   For now use the diabetes medications every day.  Watch diet much better.   Check glucose twice daily (fasting and 1-2 hours after biggest meal of day).   Bring in home glucose readings on follow-up.

## 2022-05-31 ENCOUNTER — OFFICE VISIT (OUTPATIENT)
Dept: INTERNAL MEDICINE | Age: 67
End: 2022-05-31

## 2022-05-31 VITALS
RESPIRATION RATE: 18 BRPM | HEART RATE: 112 BPM | BODY MASS INDEX: 23.7 KG/M2 | WEIGHT: 151 LBS | DIASTOLIC BLOOD PRESSURE: 64 MMHG | SYSTOLIC BLOOD PRESSURE: 118 MMHG | OXYGEN SATURATION: 98 % | TEMPERATURE: 96.8 F | HEIGHT: 67 IN

## 2022-05-31 DIAGNOSIS — Z79.4 TYPE 2 DIABETES MELLITUS WITH HYPERGLYCEMIA, WITH LONG-TERM CURRENT USE OF INSULIN: Primary | Chronic | ICD-10-CM

## 2022-05-31 DIAGNOSIS — R31.0 GROSS HEMATURIA: ICD-10-CM

## 2022-05-31 DIAGNOSIS — E11.65 TYPE 2 DIABETES MELLITUS WITH HYPERGLYCEMIA, WITH LONG-TERM CURRENT USE OF INSULIN: Primary | Chronic | ICD-10-CM

## 2022-05-31 PROCEDURE — 99214 OFFICE O/P EST MOD 30 MIN: CPT | Performed by: NURSE PRACTITIONER

## 2022-05-31 NOTE — PROGRESS NOTES
"    I N T E R N A L  M E D I C I N E  Lizy Kellogg, APRN       ENCOUNTER DATE:  05/31/2022    Magui Dumont / 66 y.o. / female      CHIEF COMPLAINT / REASON FOR OFFICE VISIT     Diabetes      ASSESSMENT & PLAN     Problem List Items Addressed This Visit        Endocrine and Metabolic    Type 2 diabetes mellitus with hyperglycemia, with long-term current use of insulin (HCC) - Primary (Chronic)    Overview     * Previously managed by endocrinologist (Bartolo). Will not be seeing endocrinology going forward.   -continue Metformin 100mg 1 tablet bid  - Continue Soliqua Inulin as prescribed         Relevant Medications    glucose blood test strip    vitamin D (ERGOCALCIFEROL) 1.25 MG (95904 UT) capsule capsule    Insulin Pen Needle (ReliOn Pen Needles) 32G X 4 MM misc    metFORMIN (GLUCOPHAGE) 1000 MG tablet    lisinopril-hydrochlorothiazide (PRINZIDE,ZESTORETIC) 20-12.5 MG per tablet    Soliqua 100-33 UNT-MCG/ML solution pen-injector injection       Genitourinary and Reproductive     Gross hematuria  -Referral to Urology         No orders of the defined types were placed in this encounter.    No orders of the defined types were placed in this encounter.      SUMMARY/DISCUSSION  • Follow up with Urology for recent Vaginal Bleeding as scheduled  • Discussion of Diabetes and continue on Metformin and Soliqua as prescribed  • Follow up with Dr Brown as scheduled     Return in about 7 weeks (around 7/20/2022) for Next scheduled follow up.        VITAL SIGNS     Vitals:    05/31/22 1244   BP: 118/64   Pulse: 112   Resp: 18   Temp: 96.8 °F (36 °C)   SpO2: 98%   Weight: 68.5 kg (151 lb)   Height: 170.2 cm (67\")       BP Readings from Last 3 Encounters:   05/31/22 118/64   05/16/22 134/82   11/15/21 134/70     Wt Readings from Last 3 Encounters:   05/31/22 68.5 kg (151 lb)   05/16/22 68.9 kg (152 lb)   11/15/21 68.9 kg (152 lb)     Body mass index is 23.65 kg/m².    Blood pressure readings recorded on patient flowsheet:  No " flowsheet data found.     MEDICATIONS AT THE TIME OF OFFICE VISIT     Current Outpatient Medications on File Prior to Visit   Medication Sig   • citalopram (CeleXA) 20 MG tablet Take 1 tablet by mouth Daily.   • glucose blood test strip OneTouch Verio In Vitro Strip; Patient Sig: CHECK BLOOD GLUCOSE 1 TIME A DAY; E11.65   • Hydrocortisone, Perianal, (ANUSOL-HC) 2.5 % rectal cream Insert  into the rectum 2 (Two) Times a Day.   • Insulin Pen Needle (ReliOn Pen Needles) 32G X 4 MM misc USE 1 PEN NEEDLE ONCE DAILY   • levothyroxine (Synthroid) 100 MCG tablet Take 1 tablet by mouth Daily.   • lisinopril-hydrochlorothiazide (PRINZIDE,ZESTORETIC) 20-12.5 MG per tablet Take 1 tablet by mouth once daily   • metFORMIN (GLUCOPHAGE) 1000 MG tablet Take 1 tablet by mouth 2 (Two) Times a Day With Meals.   • nystatin 567436 UNIT/GM powder APPLY  POWDER TOPICALLY TO AFFECTED AREA AS DIRECTED TWICE DAILY   • Omega-3 Fatty Acids (FISH OIL) 1000 MG capsule delayed-release Take  by mouth daily.   • ONETOUCH DELICA LANCETS FINE misc    • rosuvastatin (CRESTOR) 40 MG tablet Take 1 tablet by mouth Daily.   • Soliqua 100-33 UNT-MCG/ML solution pen-injector injection Inject 55 Units under the skin into the appropriate area as directed Every Morning.   • vitamin D (ERGOCALCIFEROL) 1.25 MG (56598 UT) capsule capsule Take 1 capsule by mouth 1 (One) Time Per Week.     No current facility-administered medications on file prior to visit.         HISTORY OF PRESENT ILLNESS     Blood sugar readings recorded on patient's flowsheet:  No flowsheet data found.     5/31 BS 97  5/30   5/29   5/28 BS 94    Diabetes Mellitus: Patient states they were in the midst of moving to a condo from her home and was eating out a lot and not paying attention to her diet and food choices. Now settled into her new home and Blood sugars down to appropriate levels. Will continue metformin and Soliqua. Encouraged to continue to check her BS daily ac and hs and  log for MD.     Vaginal Bleeding- denies any further episodes. Discussion of evaluation with Urology scheduled. States recently had a Pap and normal.      Lab Results   Component Value Date    HGBA1C 9.6 (H) 05/16/2022    HGBA1C 7.8 11/15/2021    HGBA1C 8.30 (H) 08/11/2021    CREATININE 0.77 05/16/2022    LDL 68 03/30/2021    MALBCRERATIO <9 03/30/2021        68.5 kg (151 lb)    REVIEW OF SYSTEMS           PHYSICAL EXAMINATION     Physical Exam  Constitutional:       Appearance: She is normal weight.   Cardiovascular:      Rate and Rhythm: Normal rate and regular rhythm.      Pulses: Normal pulses.      Heart sounds: Normal heart sounds.   Pulmonary:      Effort: Pulmonary effort is normal.      Breath sounds: Normal breath sounds.   Musculoskeletal:         General: Normal range of motion.   Neurological:      Mental Status: She is alert and oriented to person, place, and time.   Psychiatric:         Mood and Affect: Mood normal.           REVIEWED DATA     Labs:       Lab Results   Component Value Date     05/16/2022    K 4.4 05/16/2022    CALCIUM 10.0 05/16/2022    AST 25 11/15/2021    ALT 26 11/15/2021    BUN 9 05/16/2022    CREATININE 0.77 05/16/2022    CREATININE 0.79 11/15/2021    CREATININE 0.69 03/30/2021    EGFRIFNONA 78 11/15/2021    EGFRIFAFRI 90 11/15/2021       Lab Results   Component Value Date    HGBA1C 9.6 (H) 05/16/2022    HGBA1C 7.8 11/15/2021    HGBA1C 8.30 (H) 08/11/2021       Lab Results   Component Value Date    LDL 68 03/30/2021    LDL 67 10/06/2020    HDL 47 03/30/2021    TRIG 135 03/30/2021       Lab Results   Component Value Date    TSH 2.200 08/11/2021    TSH 0.252 (L) 03/30/2021    TSH 0.892 10/06/2020    FREET4 1.63 08/11/2021    FREET4 1.95 (H) 10/06/2020    FREET4 1.83 (H) 02/10/2020       Lab Results   Component Value Date    WBC 7.0 05/16/2022    HGB 14.5 05/16/2022     05/16/2022       Lab Results   Component Value Date    MICROALBUR <3.0 03/30/2021           Imaging:            Medical Tests:           Summary of old records / correspondence / consultant report:           Request outside records:           *Examiner was wearing mask protection during the entire duration of the visit. Patient was masked the entire time. Minimum social distance of 6 ft maintained entire visit except if physical contact was necessary as documented.       Template created by [email or web address] Bowers

## 2022-06-06 ENCOUNTER — TELEPHONE (OUTPATIENT)
Dept: INTERNAL MEDICINE | Age: 67
End: 2022-06-06

## 2022-06-06 NOTE — TELEPHONE ENCOUNTER
CALLED PT AND ADVISED TO CONTACT THEM. SAID SHE DID TWICE AND LVM     INFORMED REFERRAL . THEY WILL TRY TO CALL      CALLED PT BACK   Yoana called dr pittman office and informed her that dr pittman only at office for 2 days.   Pt informed above

## 2022-06-06 NOTE — TELEPHONE ENCOUNTER
Caller: Magui Dumont    Relationship to patient: Self    Best call back number: [phone]    Patient is needing: PATIENT STILL HAS NOT HEARD FROM UROLOGY ON GETTING AN APPOINTMENT, AND WOULD LIKE TO KNOW WHAT DR. ZUNIGA WOULD ADVISE THAT SHE DO. PLEASE REACH OUT TO PATIENT AND ADVISE.

## 2022-07-20 ENCOUNTER — OFFICE VISIT (OUTPATIENT)
Dept: INTERNAL MEDICINE | Age: 67
End: 2022-07-20

## 2022-07-20 VITALS
HEIGHT: 67 IN | HEART RATE: 85 BPM | WEIGHT: 152 LBS | DIASTOLIC BLOOD PRESSURE: 76 MMHG | SYSTOLIC BLOOD PRESSURE: 122 MMHG | TEMPERATURE: 98 F | OXYGEN SATURATION: 98 % | BODY MASS INDEX: 23.86 KG/M2

## 2022-07-20 DIAGNOSIS — F32.A DEPRESSION, UNSPECIFIED DEPRESSION TYPE: ICD-10-CM

## 2022-07-20 DIAGNOSIS — R31.0 GROSS HEMATURIA: ICD-10-CM

## 2022-07-20 DIAGNOSIS — E78.5 DYSLIPIDEMIA: ICD-10-CM

## 2022-07-20 DIAGNOSIS — Z79.4 TYPE 2 DIABETES MELLITUS WITH HYPERGLYCEMIA, WITH LONG-TERM CURRENT USE OF INSULIN: Primary | Chronic | ICD-10-CM

## 2022-07-20 DIAGNOSIS — E11.65 TYPE 2 DIABETES MELLITUS WITH HYPERGLYCEMIA, WITH LONG-TERM CURRENT USE OF INSULIN: Primary | Chronic | ICD-10-CM

## 2022-07-20 DIAGNOSIS — E03.8 HYPOTHYROIDISM DUE TO HASHIMOTO'S THYROIDITIS: Chronic | ICD-10-CM

## 2022-07-20 DIAGNOSIS — E06.3 HYPOTHYROIDISM DUE TO HASHIMOTO'S THYROIDITIS: Chronic | ICD-10-CM

## 2022-07-20 LAB
EXPIRATION DATE: NORMAL
HBA1C MFR BLD: 7.5 %
Lab: NORMAL

## 2022-07-20 PROCEDURE — 99214 OFFICE O/P EST MOD 30 MIN: CPT | Performed by: INTERNAL MEDICINE

## 2022-07-20 PROCEDURE — 3051F HG A1C>EQUAL 7.0%<8.0%: CPT | Performed by: INTERNAL MEDICINE

## 2022-07-20 PROCEDURE — 83036 HEMOGLOBIN GLYCOSYLATED A1C: CPT | Performed by: INTERNAL MEDICINE

## 2022-07-20 RX ORDER — INSULIN GLARGINE AND LIXISENATIDE 100; 33 U/ML; UG/ML
60 INJECTION, SOLUTION SUBCUTANEOUS EVERY MORNING
Start: 2022-07-20 | End: 2022-11-11

## 2022-07-20 RX ORDER — CITALOPRAM 20 MG/1
20 TABLET ORAL DAILY
Qty: 90 TABLET | Refills: 1 | Status: SHIPPED | OUTPATIENT
Start: 2022-07-20 | End: 2023-03-31 | Stop reason: SDUPTHER

## 2022-07-20 NOTE — PATIENT INSTRUCTIONS
** IMPORTANT MESSAGE FROM DR. ZUNIGA **    In our office, your satisfaction is VERY important to us.     You may receive a survey from [facility] by mail or E-mail for you to provide feedback about your visit. This information is invaluable for me to know what we can do to improve our services.     I ask that you please take a few minutes to complete the survey and let us know how we are doing in serving your needs. (You may receive the survey more than once for multiple visits)    Thank You !    Dr. Zuniga    _________________________________________________________________________________________________________________________      ** ADDITIONAL INSTRUCTION / REMINDERS FROM DR. ZUNIGA **

## 2022-07-20 NOTE — PROGRESS NOTES
I N T E R N A L  M E D I C I N E  J U N O H  K I M,  M D      ENCOUNTER DATE:  07/20/2022    Magui Duomnt / 66 y.o. / female      CHIEF COMPLAINT / REASON FOR OFFICE VISIT     Diabetes      ASSESSMENT & PLAN     Problem List Items Addressed This Visit        High    Type 2 diabetes mellitus with hyperglycemia, with long-term current use of insulin (HCC) - Primary (Chronic)    Overview     * Previously managed by endocrinologist (Bartolo). Will not be seeing endocrinology going forward.            Current Assessment & Plan     Blood sugar is much improved since last time.  A1c is significantly lower at 7.5.  She has been eating much better than she has in the past.  Will continue Soliqua but increase the dose to 60 units daily.  Will follow-up in 3 months with POC A1c.  Will consider switching her to basal insulin and Ozempic if needed on follow-up.           Relevant Medications    glucose blood test strip    vitamin D (ERGOCALCIFEROL) 1.25 MG (12325 UT) capsule capsule    Insulin Pen Needle (ReliOn Pen Needles) 32G X 4 MM misc    metFORMIN (GLUCOPHAGE) 1000 MG tablet    lisinopril-hydrochlorothiazide (PRINZIDE,ZESTORETIC) 20-12.5 MG per tablet    Soliqua 100-33 UNT-MCG/ML solution pen-injector injection    Other Relevant Orders    POC Glycosylated Hemoglobin (Hb A1C) (Completed)       Medium    Dyslipidemia (Chronic)    Overview     On rosuvastatin 40 mg qHS            Relevant Medications    vitamin D (ERGOCALCIFEROL) 1.25 MG (47612 UT) capsule capsule    lisinopril-hydrochlorothiazide (PRINZIDE,ZESTORETIC) 20-12.5 MG per tablet    Other Relevant Orders    Lipid Panel With / Chol / HDL Ratio    Hypothyroidism (Chronic)    Overview     Continue levothyroxine 100 mcg           Relevant Medications    vitamin D (ERGOCALCIFEROL) 1.25 MG (47556 UT) capsule capsule    levothyroxine (Synthroid) 100 MCG tablet    Other Relevant Orders    TSH+Free T4       Low    Depression (Chronic)    Overview     Continue citalopram  "20 mg qd.            Relevant Medications    citalopram (CeleXA) 20 MG tablet    Gross hematuria    Current Assessment & Plan     Has appointment to see urology               Orders Placed This Encounter   Procedures   • TSH+Free T4   • Lipid Panel With / Chol / HDL Ratio   • POC Glycosylated Hemoglobin (Hb A1C)     New Medications Ordered This Visit   Medications   • Soliqua 100-33 UNT-MCG/ML solution pen-injector injection     Sig: Inject 60 Units under the skin into the appropriate area as directed Every Morning.   • citalopram (CeleXA) 20 MG tablet     Sig: Take 1 tablet by mouth Daily.     Dispense:  90 tablet     Refill:  1       SUMMARY/DISCUSSION  •       Next Appointment with me: Visit date not found    Return in about 3 months (around 10/20/2022) for POCT A1C.        VITAL SIGNS     Vitals:    07/20/22 1033   BP: 122/76   BP Location: Left arm   Pulse: 85   Temp: 98 °F (36.7 °C)   SpO2: 98%   Weight: 68.9 kg (152 lb)   Height: 170.2 cm (67\")       BP Readings from Last 3 Encounters:   07/20/22 122/76   05/31/22 118/64   05/16/22 134/82     Wt Readings from Last 3 Encounters:   07/20/22 68.9 kg (152 lb)   05/31/22 68.5 kg (151 lb)   05/16/22 68.9 kg (152 lb)     Body mass index is 23.81 kg/m².    Blood pressure readings recorded on patient flowsheet:  No flowsheet data found.     MEDICATIONS AT THE TIME OF OFFICE VISIT     Current Outpatient Medications on File Prior to Visit   Medication Sig   • glucose blood test strip OneTouch Verio In Vitro Strip; Patient Sig: CHECK BLOOD GLUCOSE 1 TIME A DAY; E11.65   • Hydrocortisone, Perianal, (ANUSOL-HC) 2.5 % rectal cream Insert  into the rectum 2 (Two) Times a Day.   • Insulin Pen Needle (ReliOn Pen Needles) 32G X 4 MM misc USE 1 PEN NEEDLE ONCE DAILY   • levothyroxine (Synthroid) 100 MCG tablet Take 1 tablet by mouth Daily.   • lisinopril-hydrochlorothiazide (PRINZIDE,ZESTORETIC) 20-12.5 MG per tablet Take 1 tablet by mouth once daily   • metFORMIN (GLUCOPHAGE) " 1000 MG tablet Take 1 tablet by mouth 2 (Two) Times a Day With Meals.   • nystatin 727939 UNIT/GM powder APPLY  POWDER TOPICALLY TO AFFECTED AREA AS DIRECTED TWICE DAILY   • Omega-3 Fatty Acids (FISH OIL) 1000 MG capsule delayed-release Take  by mouth daily.   • ONETOUCH DELICA LANCETS FINE misc    • rosuvastatin (CRESTOR) 40 MG tablet Take 1 tablet by mouth Daily.   • vitamin D (ERGOCALCIFEROL) 1.25 MG (79060 UT) capsule capsule Take 1 capsule by mouth 1 (One) Time Per Week.   • [DISCONTINUED] citalopram (CeleXA) 20 MG tablet Take 1 tablet by mouth Daily.   • [DISCONTINUED] Soliqua 100-33 UNT-MCG/ML solution pen-injector injection Inject 55 Units under the skin into the appropriate area as directed Every Morning.     No current facility-administered medications on file prior to visit.         HISTORY OF PRESENT ILLNESS     Blood sugar readings recorded on patient's flowsheet:  No flowsheet data found.     She has been eating much better and blood sugars have improved significantly.  A1c level today has improved to 7.5 on Soliqua 55 units daily and metformin.  Fasting blood sugar today was 117 at home.  She requests a refill on citalopram for depression.  Clinically her depression has remained stable.  She is on statin therapy for hyperlipidemia without significant problems and is due for labs.  She is on levothyroxine 100 mcg for Hashimoto's thyroiditis hypothyroidism.  She has an appointment to see urologist regarding gross hematuria.       Lab Results   Component Value Date    HGBA1C 7.5 07/20/2022    HGBA1C 9.6 (H) 05/16/2022    HGBA1C 7.8 11/15/2021    CREATININE 0.77 05/16/2022    LDL 68 03/30/2021    MALBCRERATIO <9 03/30/2021        68.9 kg (152 lb)    REVIEW OF SYSTEMS     Gross hematuria resolved for now  GI negative   No significant change in mood or cognition       PHYSICAL EXAMINATION     Physical Exam  General: No acute distress  Psych: Normal thought and judgment       REVIEWED DATA     Labs:        Lab Results   Component Value Date     05/16/2022    K 4.4 05/16/2022    CALCIUM 10.0 05/16/2022    AST 25 11/15/2021    ALT 26 11/15/2021    BUN 9 05/16/2022    CREATININE 0.77 05/16/2022    CREATININE 0.79 11/15/2021    CREATININE 0.69 03/30/2021    EGFRIFNONA 78 11/15/2021    EGFRIFAFRI 90 11/15/2021       Lab Results   Component Value Date    HGBA1C 7.5 07/20/2022    HGBA1C 9.6 (H) 05/16/2022    HGBA1C 7.8 11/15/2021       Lab Results   Component Value Date    LDL 68 03/30/2021    LDL 67 10/06/2020    LDL 91 02/10/2020    HDL 47 03/30/2021    HDL 50 10/06/2020    TRIG 135 03/30/2021    TRIG 158 (H) 10/06/2020       Lab Results   Component Value Date    TSH 2.200 08/11/2021    TSH 0.252 (L) 03/30/2021    TSH 0.892 10/06/2020    FREET4 1.63 08/11/2021    FREET4 1.95 (H) 10/06/2020    FREET4 1.83 (H) 02/10/2020       Lab Results   Component Value Date    WBC 7.0 05/16/2022    HGB 14.5 05/16/2022     05/16/2022       Lab Results   Component Value Date    MALBCRERATIO <9 03/30/2021        Lab Results   Component Value Date    PROTEIN Negative 05/16/2022    GLUCOSEU 3+ (A) 05/16/2022    BLOODU Negative 05/16/2022    NITRITEU Negative 05/16/2022    LEUKOCYTESUR Negative 05/16/2022    LEUKOCYTESUR Negative 05/16/2022        Imaging:           Medical Tests:           Summary of old records / correspondence / consultant report:           Request outside records:           *Examiner was wearing KN95 mask and eye protection during the entire duration of the visit. Patient was masked the entire time. Minimum social distance of 6 ft maintained entire visit except if physical contact was necessary as documented.       Template created by Bear Brown MD

## 2022-07-20 NOTE — ASSESSMENT & PLAN NOTE
Blood sugar is much improved since last time.  A1c is significantly lower at 7.5.  She has been eating much better than she has in the past.  Will continue Soliqua but increase the dose to 60 units daily.  Will follow-up in 3 months with POC A1c.  Will consider switching her to basal insulin and Ozempic if needed on follow-up.

## 2022-07-21 LAB
CHOLEST SERPL-MCNC: 145 MG/DL (ref 100–199)
CHOLEST/HDLC SERPL: 3.5 RATIO (ref 0–4.4)
HDLC SERPL-MCNC: 42 MG/DL
LDLC SERPL CALC-MCNC: 80 MG/DL (ref 0–99)
T4 FREE SERPL-MCNC: 1.52 NG/DL (ref 0.82–1.77)
TRIGL SERPL-MCNC: 131 MG/DL (ref 0–149)
TSH SERPL DL<=0.005 MIU/L-ACNC: 1.16 UIU/ML (ref 0.45–4.5)
VLDLC SERPL CALC-MCNC: 23 MG/DL (ref 5–40)

## 2022-07-25 ENCOUNTER — APPOINTMENT (OUTPATIENT)
Dept: BONE DENSITY | Facility: HOSPITAL | Age: 67
End: 2022-07-25

## 2022-08-20 DIAGNOSIS — Z79.4 TYPE 2 DIABETES MELLITUS WITHOUT COMPLICATION, WITH LONG-TERM CURRENT USE OF INSULIN: ICD-10-CM

## 2022-08-20 DIAGNOSIS — E11.9 TYPE 2 DIABETES MELLITUS WITHOUT COMPLICATION, WITH LONG-TERM CURRENT USE OF INSULIN: ICD-10-CM

## 2022-08-22 RX ORDER — PEN NEEDLE, DIABETIC 32GX 5/32"
NEEDLE, DISPOSABLE MISCELLANEOUS
Qty: 100 EACH | Refills: 3 | Status: SHIPPED | OUTPATIENT
Start: 2022-08-22

## 2022-10-05 ENCOUNTER — TRANSCRIBE ORDERS (OUTPATIENT)
Dept: ADMINISTRATIVE | Facility: HOSPITAL | Age: 67
End: 2022-10-05

## 2022-10-05 DIAGNOSIS — N95.0 POSTMENOPAUSAL BLEEDING: Primary | ICD-10-CM

## 2022-10-05 DIAGNOSIS — R31.0 GROSS HEMATURIA: ICD-10-CM

## 2022-10-27 ENCOUNTER — HOSPITAL ENCOUNTER (OUTPATIENT)
Dept: ULTRASOUND IMAGING | Facility: HOSPITAL | Age: 67
Discharge: HOME | End: 2022-10-27

## 2022-10-27 ENCOUNTER — HOSPITAL ENCOUNTER (OUTPATIENT)
Dept: CT IMAGING | Facility: HOSPITAL | Age: 67
Discharge: HOME | End: 2022-10-27

## 2022-10-27 DIAGNOSIS — R31.0 GROSS HEMATURIA: ICD-10-CM

## 2022-10-27 DIAGNOSIS — N95.0 POSTMENOPAUSAL BLEEDING: ICD-10-CM

## 2022-10-27 PROCEDURE — 82565 ASSAY OF CREATININE: CPT

## 2022-10-27 PROCEDURE — 76700 US EXAM ABDOM COMPLETE: CPT

## 2022-10-27 PROCEDURE — 76830 TRANSVAGINAL US NON-OB: CPT

## 2022-10-27 PROCEDURE — 25010000002 IOPAMIDOL 61 % SOLUTION: Performed by: OBSTETRICS & GYNECOLOGY

## 2022-10-27 PROCEDURE — 74178 CT ABD&PLV WO CNTR FLWD CNTR: CPT

## 2022-10-27 PROCEDURE — 76856 US EXAM PELVIC COMPLETE: CPT

## 2022-10-27 RX ADMIN — IOPAMIDOL 100 ML: 612 INJECTION, SOLUTION INTRAVENOUS at 13:12

## 2022-10-28 LAB — CREAT BLDA-MCNC: 0.5 MG/DL (ref 0.6–1.3)

## 2022-11-04 ENCOUNTER — TELEPHONE (OUTPATIENT)
Dept: INTERNAL MEDICINE | Age: 67
End: 2022-11-04

## 2022-11-04 NOTE — TELEPHONE ENCOUNTER
Caller: Magui Dumont    Relationship: Self    Best call back number: [phone]    What was the call regarding: PATIENT WOULD LIKE TO LET DR ZUNIGA KNOW SHE HAS SEEN AN EYE DOCTOR RECENTLY. PATIENT STATES SHE WAS SEEN IN MARCH OR April BY LELO KNOWLES     Do you require a callback: NO

## 2022-11-07 ENCOUNTER — PREP FOR SURGERY (OUTPATIENT)
Dept: OTHER | Facility: HOSPITAL | Age: 67
End: 2022-11-07

## 2022-11-07 DIAGNOSIS — R93.89 ABNORMAL RADIOGRAPHIC EXAMINATION: Primary | ICD-10-CM

## 2022-11-07 DIAGNOSIS — N95.0 POSTMENOPAUSAL BLEEDING: ICD-10-CM

## 2022-11-07 DIAGNOSIS — R93.89 THICKENED ENDOMETRIUM: ICD-10-CM

## 2022-11-07 RX ORDER — SODIUM CHLORIDE 0.9 % (FLUSH) 0.9 %
10 SYRINGE (ML) INJECTION EVERY 12 HOURS SCHEDULED
Status: CANCELLED | OUTPATIENT
Start: 2023-02-22

## 2022-11-07 RX ORDER — CEFAZOLIN SODIUM 2 G/100ML
2 INJECTION, SOLUTION INTRAVENOUS ONCE
Status: CANCELLED | OUTPATIENT
Start: 2023-02-22 | End: 2022-11-07

## 2022-11-07 RX ORDER — SODIUM CHLORIDE 0.9 % (FLUSH) 0.9 %
10 SYRINGE (ML) INJECTION AS NEEDED
Status: CANCELLED | OUTPATIENT
Start: 2023-02-22

## 2022-11-11 DIAGNOSIS — E11.65 TYPE 2 DIABETES MELLITUS WITH HYPERGLYCEMIA, WITH LONG-TERM CURRENT USE OF INSULIN: Chronic | ICD-10-CM

## 2022-11-11 DIAGNOSIS — Z79.4 TYPE 2 DIABETES MELLITUS WITH HYPERGLYCEMIA, WITH LONG-TERM CURRENT USE OF INSULIN: Chronic | ICD-10-CM

## 2022-11-11 RX ORDER — INSULIN GLARGINE AND LIXISENATIDE 100; 33 U/ML; UG/ML
INJECTION, SOLUTION SUBCUTANEOUS
Qty: 15 ML | Refills: 3 | Status: SHIPPED | OUTPATIENT
Start: 2022-11-11 | End: 2023-03-31 | Stop reason: SDUPTHER

## 2022-11-21 ENCOUNTER — TELEPHONE (OUTPATIENT)
Dept: INTERNAL MEDICINE | Age: 67
End: 2022-11-21

## 2022-11-21 ENCOUNTER — OFFICE VISIT (OUTPATIENT)
Dept: INTERNAL MEDICINE | Age: 67
End: 2022-11-21

## 2022-11-21 VITALS
WEIGHT: 164 LBS | BODY MASS INDEX: 25.74 KG/M2 | HEART RATE: 102 BPM | HEIGHT: 67 IN | OXYGEN SATURATION: 97 % | TEMPERATURE: 97.3 F | DIASTOLIC BLOOD PRESSURE: 70 MMHG | SYSTOLIC BLOOD PRESSURE: 120 MMHG

## 2022-11-21 DIAGNOSIS — L02.811 CUTANEOUS ABSCESS OF HEAD EXCLUDING FACE: Primary | ICD-10-CM

## 2022-11-21 PROCEDURE — 99213 OFFICE O/P EST LOW 20 MIN: CPT

## 2022-11-21 RX ORDER — CEPHALEXIN 500 MG/1
500 CAPSULE ORAL 2 TIMES DAILY
Qty: 14 CAPSULE | Refills: 0 | Status: SHIPPED | OUTPATIENT
Start: 2022-11-21 | End: 2022-11-28

## 2022-11-21 NOTE — TELEPHONE ENCOUNTER
Caller: Magui Dumont    Relationship: Self    Best call back number: [address] - [address] [facility] [phone] [place] [phone] FX        What was the call regarding: PATIENT STATES SHE HAS A FEW CYST ON HER SCALP AND SHE HAS NOTICED THAT ONE OF THEM HAS STARTED GETTING SORE AND POSSIBLE BIGGER IN SIZE. PATIENT HAS TALKED TO HER DERMATOLOGIST'S OFFICE AND THEY STATED THAT HER DOCTOR IS OUT OF THE OFFICE SO THEY SUGGESTED HER CONTACT DR ZUNIGA TO SEE IF AN ANTIBIOTIC COLD BE CALLED IN TO HELP WITH THIS. PATIENT WOULD LIKE TO SEE IF A MEDICATION COULD BE CALLED IN AND PLEASE LET THE PATIENT KNOW IF SHE NEEDS TO MAKE AN APPOINTMENT.    [address] - [address] [facility] [phone] [place] [phone] FX        PLEASE CALL AND ADVISE     Do you require a callback: YES

## 2022-11-30 ENCOUNTER — OFFICE VISIT (OUTPATIENT)
Dept: SURGERY | Facility: CLINIC | Age: 67
End: 2022-11-30

## 2022-11-30 VITALS — HEIGHT: 67 IN | BODY MASS INDEX: 25.43 KG/M2 | WEIGHT: 162 LBS

## 2022-11-30 DIAGNOSIS — L72.9 SCALP CYST: Primary | ICD-10-CM

## 2022-11-30 PROCEDURE — 11421 EXC H-F-NK-SP B9+MARG 0.6-1: CPT | Performed by: SURGERY

## 2022-11-30 PROCEDURE — 88304 TISSUE EXAM BY PATHOLOGIST: CPT | Performed by: SURGERY

## 2022-12-02 LAB
LAB AP CASE REPORT: NORMAL
PATH REPORT.FINAL DX SPEC: NORMAL
PATH REPORT.GROSS SPEC: NORMAL

## 2022-12-07 ENCOUNTER — PREP FOR SURGERY (OUTPATIENT)
Dept: OTHER | Facility: HOSPITAL | Age: 67
End: 2022-12-07

## 2022-12-17 DIAGNOSIS — I10 BENIGN ESSENTIAL HYPERTENSION: Chronic | ICD-10-CM

## 2022-12-17 DIAGNOSIS — Z79.4 TYPE 2 DIABETES MELLITUS WITH HYPERGLYCEMIA, WITH LONG-TERM CURRENT USE OF INSULIN: ICD-10-CM

## 2022-12-17 DIAGNOSIS — Z79.4 TYPE 2 DIABETES MELLITUS WITHOUT COMPLICATION, WITH LONG-TERM CURRENT USE OF INSULIN: ICD-10-CM

## 2022-12-17 DIAGNOSIS — E11.65 TYPE 2 DIABETES MELLITUS WITH HYPERGLYCEMIA, WITH LONG-TERM CURRENT USE OF INSULIN: ICD-10-CM

## 2022-12-17 DIAGNOSIS — E55.9 VITAMIN D DEFICIENCY: ICD-10-CM

## 2022-12-17 DIAGNOSIS — E78.5 DYSLIPIDEMIA: Chronic | ICD-10-CM

## 2022-12-17 DIAGNOSIS — E03.9 HYPOTHYROIDISM, UNSPECIFIED TYPE: ICD-10-CM

## 2022-12-17 DIAGNOSIS — E11.9 TYPE 2 DIABETES MELLITUS WITHOUT COMPLICATION, WITH LONG-TERM CURRENT USE OF INSULIN: ICD-10-CM

## 2022-12-19 ENCOUNTER — PROCEDURE VISIT (OUTPATIENT)
Dept: SURGERY | Facility: CLINIC | Age: 67
End: 2022-12-19

## 2022-12-19 DIAGNOSIS — L72.9 SCALP CYST: Primary | ICD-10-CM

## 2022-12-19 PROCEDURE — 88304 TISSUE EXAM BY PATHOLOGIST: CPT | Performed by: SURGERY

## 2022-12-19 PROCEDURE — 11421 EXC H-F-NK-SP B9+MARG 0.6-1: CPT | Performed by: SURGERY

## 2022-12-19 RX ORDER — INSULIN GLARGINE AND LIXISENATIDE 100; 33 U/ML; UG/ML
INJECTION, SOLUTION SUBCUTANEOUS
COMMUNITY
Start: 2022-12-09 | End: 2022-12-19

## 2022-12-19 RX ORDER — ERGOCALCIFEROL 1.25 MG/1
CAPSULE ORAL
Qty: 13 CAPSULE | Refills: 2 | Status: SHIPPED | OUTPATIENT
Start: 2022-12-19

## 2022-12-19 RX ORDER — LEVOTHYROXINE SODIUM 0.1 MG/1
TABLET ORAL
Qty: 30 TABLET | Refills: 5 | Status: SHIPPED | OUTPATIENT
Start: 2022-12-19

## 2022-12-19 NOTE — PROGRESS NOTES
Procedure note:    Preoperative diagnosis: Cyst of the scalp x2    Postoperative diagnosis: Same    Procedure performed: Excision of scalp cyst, each cyst approximately 1 x 1 cm x 2    Surgeon: Wu Taylor MD    Anesthesia: Local    Estimated blood loss: 10 cc    Specimens: Cyst of the vertex of the scalp  Cyst of the posterior occipital scalp    Complications: None noted    Description of procedure:  After discussion with patient, both sites were appropriately identified.  A small amount of hair was clipped.  The sites were marked.  The areas were then anesthetized with a mixture of lidocaine and Marcaine after sterile prep and drape.  I started with the inferior posterior cyst along the occipital scalp.  Transversely oriented incision was made over this and I dissected through the skin onto the cyst wall.  This was dissected circumferentially with curved iris scissors and  from the surrounding subcutaneous tissues.  The size was approximately 1 x 1 cm.  The cyst was excised completely.  Skin incision was then closed with running 4-0 Vicryl.  Good hemostasis was achieved.  Attention was then turned to the cyst at the vertex of the scalp.  Again this area was sterilely prepped and draped and infiltrated with Marcaine and lidocaine.  Transversely oriented incision was made over this and dissected through the skin and subcutaneous tissues.  The cyst wall was encountered and I dissected circumferentially around the cyst using curved iris scissors.  The cyst was excised in its entirety.  It size was approximately 1 x 1 cm.  The cyst was then sent off for pathologic evaluation.  Skin incision was closed with running 4-0 Vicryl.  Tolerated well.    Wu Taylor MD  General and Endoscopic Surgery  [facility]    [address], [address]  [place], [place], [address]  P: [phone]  F: [phone]

## 2022-12-21 LAB
LAB AP CASE REPORT: NORMAL
PATH REPORT.FINAL DX SPEC: NORMAL
PATH REPORT.GROSS SPEC: NORMAL

## 2023-01-16 RX ORDER — ROSUVASTATIN CALCIUM 40 MG/1
40 TABLET, COATED ORAL DAILY
Qty: 90 TABLET | Refills: 3 | OUTPATIENT
Start: 2023-01-16

## 2023-02-07 ENCOUNTER — APPOINTMENT (OUTPATIENT)
Dept: WOMENS IMAGING | Facility: HOSPITAL | Age: 68
End: 2023-02-07
Payer: MEDICARE

## 2023-02-07 PROCEDURE — 77063 BREAST TOMOSYNTHESIS BI: CPT | Performed by: RADIOLOGY

## 2023-02-07 PROCEDURE — 77067 SCR MAMMO BI INCL CAD: CPT | Performed by: RADIOLOGY

## 2023-02-08 ENCOUNTER — PATIENT MESSAGE (OUTPATIENT)
Dept: INTERNAL MEDICINE | Age: 68
End: 2023-02-08
Payer: MEDICARE

## 2023-02-09 DIAGNOSIS — E78.5 DYSLIPIDEMIA: Primary | ICD-10-CM

## 2023-02-09 RX ORDER — ROSUVASTATIN CALCIUM 20 MG/1
20 TABLET, COATED ORAL NIGHTLY
Qty: 30 TABLET | Refills: 3 | Status: SHIPPED | OUTPATIENT
Start: 2023-02-09 | End: 2023-03-31 | Stop reason: SDUPTHER

## 2023-02-09 NOTE — TELEPHONE ENCOUNTER
From: Magui Dumont  To: Venkat Brown  Sent: 2/8/2023 11:37 AM EST  Subject: Rouvastatin 40mg    Dr Brown, can you call this medication in to my pharmacy? When I was seeing Dr Mcfarland he gave me a bunch of samples and I have used them all up. Thank you

## 2023-02-09 NOTE — ASSESSMENT & PLAN NOTE
Decrease rosuvastatin to 20 mg daily. Will check labs on follow-up.     Lab Results   Component Value Date    LDL 80 07/20/2022    LDL 68 03/30/2021    LDL 67 10/06/2020     Lab Results   Component Value Date    HDL 42 07/20/2022    HDL 47 03/30/2021    HDL 50 10/06/2020     Lab Results   Component Value Date    TRIG 131 07/20/2022    TRIG 135 03/30/2021    TRIG 158 (H) 10/06/2020     Lab Results   Component Value Date    CHOLHDLRATIO 3.5 07/20/2022

## 2023-02-14 ENCOUNTER — PREP FOR SURGERY (OUTPATIENT)
Dept: OTHER | Facility: HOSPITAL | Age: 68
End: 2023-02-14
Payer: MEDICARE

## 2023-02-14 PROBLEM — R93.89 ABNORMAL RADIOGRAPHIC EXAMINATION: Status: ACTIVE | Noted: 2023-02-14

## 2023-02-14 PROBLEM — N95.0 POSTMENOPAUSAL BLEEDING: Status: ACTIVE | Noted: 2023-02-14

## 2023-02-14 PROBLEM — R93.89 THICKENED ENDOMETRIUM: Status: ACTIVE | Noted: 2023-02-14

## 2023-02-17 ENCOUNTER — PRE-ADMISSION TESTING (OUTPATIENT)
Dept: PREADMISSION TESTING | Facility: HOSPITAL | Age: 68
End: 2023-02-17
Payer: MEDICARE

## 2023-02-17 VITALS
SYSTOLIC BLOOD PRESSURE: 163 MMHG | RESPIRATION RATE: 16 BRPM | WEIGHT: 161.7 LBS | HEART RATE: 96 BPM | OXYGEN SATURATION: 97 % | HEIGHT: 67 IN | TEMPERATURE: 98.6 F | BODY MASS INDEX: 25.38 KG/M2 | DIASTOLIC BLOOD PRESSURE: 91 MMHG

## 2023-02-17 LAB
ANION GAP SERPL CALCULATED.3IONS-SCNC: 11.3 MMOL/L (ref 5–15)
BUN SERPL-MCNC: 9 MG/DL (ref 8–23)
BUN/CREAT SERPL: 13.8 (ref 7–25)
CALCIUM SPEC-SCNC: 9.9 MG/DL (ref 8.6–10.5)
CHLORIDE SERPL-SCNC: 97 MMOL/L (ref 98–107)
CO2 SERPL-SCNC: 26.7 MMOL/L (ref 22–29)
CREAT SERPL-MCNC: 0.65 MG/DL (ref 0.57–1)
DEPRECATED RDW RBC AUTO: 40.6 FL (ref 37–54)
EGFRCR SERPLBLD CKD-EPI 2021: 96 ML/MIN/1.73
ERYTHROCYTE [DISTWIDTH] IN BLOOD BY AUTOMATED COUNT: 12.6 % (ref 12.3–15.4)
GLUCOSE SERPL-MCNC: 151 MG/DL (ref 65–99)
HCT VFR BLD AUTO: 41.9 % (ref 34–46.6)
HGB BLD-MCNC: 14.7 G/DL (ref 12–15.9)
MCH RBC QN AUTO: 31.3 PG (ref 26.6–33)
MCHC RBC AUTO-ENTMCNC: 35.1 G/DL (ref 31.5–35.7)
MCV RBC AUTO: 89.1 FL (ref 79–97)
PLATELET # BLD AUTO: 212 10*3/MM3 (ref 140–450)
PMV BLD AUTO: 8.9 FL (ref 6–12)
POTASSIUM SERPL-SCNC: 3.9 MMOL/L (ref 3.5–5.2)
QT INTERVAL: 328 MS
RBC # BLD AUTO: 4.7 10*6/MM3 (ref 3.77–5.28)
SODIUM SERPL-SCNC: 135 MMOL/L (ref 136–145)
WBC NRBC COR # BLD: 9.47 10*3/MM3 (ref 3.4–10.8)

## 2023-02-17 PROCEDURE — 36415 COLL VENOUS BLD VENIPUNCTURE: CPT

## 2023-02-17 PROCEDURE — 85027 COMPLETE CBC AUTOMATED: CPT

## 2023-02-17 PROCEDURE — 93005 ELECTROCARDIOGRAM TRACING: CPT

## 2023-02-17 PROCEDURE — 93010 ELECTROCARDIOGRAM REPORT: CPT | Performed by: INTERNAL MEDICINE

## 2023-02-17 PROCEDURE — 80048 BASIC METABOLIC PNL TOTAL CA: CPT

## 2023-02-17 NOTE — DISCHARGE INSTRUCTIONS
CHLORHEXIDINE CLOTH INSTRUCTIONS  The morning of surgery follow these instructions using the Chlorhexidine cloths you've been given.  These steps reduce bacteria on the body.  Do not use the cloths near your eyes, ears mouth, genitalia or on open wounds.  Throw the cloths away after use but do not try to flush them down a toilet.      Open and remove one cloth at a time from the package.    Leave the cloth unfolded and begin the bathing.  Massage the skin with the cloths using gentle pressure to remove bacteria.  Do not scrub harshly.   Follow the steps below with one 2% CHG cloth per area (6 total cloths).  One cloth for neck, shoulders and chest.  One cloth for both arms, hands, fingers and underarms (do underarms last).  One cloth for the abdomen followed by groin.  One cloth for right leg and foot including between the toes.  One cloth for left leg and foot including between the toes.  The last cloth is to be used for the back of the neck, back and buttocks.    Allow the CHG to air dry 3 minutes on the skin which will give it time to work and decrease the chance of irritation.  The skin may feel sticky until it is dry.  Do not rinse with water or any other liquid or you will lose the beneficial effects of the CHG.  If mild skin irritation occurs, do rinse the skin to remove the CHG.  Report this to the nurse at time of admission.  Do not apply lotions, creams, ointments, deodorants or perfumes after using the clothes. Dress in clean clothes before coming to the hospital.     Take the following medications the morning of surgery:CELEXA AND LEVOTHYROXINE    ARRIVAL TIME 10:00        General Instructions:  Do not eat solid food after midnight the night before surgery.  You may drink clear liquids day of surgery but must stop at least one hour before your hospital arrival time.  It is beneficial for you to have a clear drink that contains carbohydrates the day of surgery.  We suggest a 12 to 20 ounce bottle of  Gatorade or Powerade for non-diabetic patients or a 12 to 20 ounce bottle of G2 or Powerade Zero for diabetic patients. (Pediatric patients, are not advised to drink a 12 to 20 ounce carbohydrate drink)    Clear liquids are liquids you can see through.  Nothing red in color.     Plain water                               Sports drinks  Sodas                                   Gelatin (Jell-O)  Fruit juices without pulp such as white grape juice and apple juice  Popsicles that contain no fruit or yogurt  Tea or coffee (no cream or milk added)  Gatorade / Powerade  G2 / Powerade Zero      Patients who avoid smoking, chewing tobacco and alcohol for 4 weeks prior to surgery have a reduced risk of post-operative complications.  Quit smoking as many days before surgery as you can.  Do not smoke, use chewing tobacco or drink alcohol the day of surgery.   If applicable bring your C-PAP/ BI-PAP machine.  Bring any papers given to you in the doctor’s office.  Wear clean comfortable clothes.  Do not wear contact lenses, false eyelashes or make-up.  Bring a case for your glasses.   Bring crutches or walker if applicable.  Remove all piercings.  Leave jewelry and any other valuables at home.  Hair extensions with metal clips must be removed prior to surgery.  The Pre-Admission Testing nurse will instruct you to bring medications if unable to obtain an accurate list in Pre-Admission Testing.        Preventing a Surgical Site Infection:  For 2 to 3 days before surgery, avoid shaving with a razor because the razor can irritate skin and make it easier to develop an infection.    Any areas of open skin can increase the risk of a post-operative wound infection by allowing bacteria to enter and travel throughout the body.  Notify your surgeon if you have any skin wounds / rashes even if it is not near the expected surgical site.  The area will need assessed to determine if surgery should be delayed until it is healed.  The night prior to  surgery shower using a fresh bar of anti-bacterial soap (such as Dial) and clean washcloth.  Sleep in a clean bed with clean clothing.  Do not allow pets to sleep with you.  Shower on the morning of surgery using a fresh bar of anti-bacterial soap (such as Dial) and clean washcloth.  Dry with a clean towel and dress in clean clothing.  Ask your surgeon if you will be receiving antibiotics prior to surgery.  Make sure you, your family, and all healthcare providers clean their hands with soap and water or an alcohol based hand  before caring for you or your wound.    Day of surgery:  Your arrival time is approximately two hours before your scheduled surgery time.  Upon arrival, a Pre-op nurse and Anesthesiologist will review your health history, obtain vital signs, and answer questions you may have.  The only belongings needed at this time will be a list of your home medications and if applicable your C-PAP/BI-PAP machine.  A Pre-op nurse will start an IV and you may receive medication in preparation for surgery, including something to help you relax.     Please be aware that surgery does come with discomfort.  We want to make every effort to control your discomfort so please discuss any uncontrolled symptoms with your nurse.   Your doctor will most likely have prescribed pain medications.      If you are going home after surgery you will receive individualized written care instructions before being discharged.  A responsible adult must drive you to and from the hospital on the day of your surgery and stay with you for 24 hours.  Discharge prescriptions can be filled by the hospital pharmacy during regular pharmacy hours.  If you are having surgery late in the day/evening your prescription may be e-prescribed to your pharmacy.  Please verify your pharmacy hours or chose a 24 hour pharmacy to avoid not having access to your prescription because your pharmacy has closed for the day.    If you are staying  overnight following surgery, you will be transported to your hospital room following the recovery period.  [facility] has all private rooms.    If you have any questions please call Pre-Admission Testing at [phone].  Deductibles and co-payments are collected on the day of service. Please be prepared to pay the required co-pay, deductible or deposit on the day of service as defined by your plan.    Call your surgeon immediately if you experience any of the following symptoms:  Sore Throat  Shortness of Breath or difficulty breathing  Cough  Chills  Body soreness or muscle pain  Headache  Fever  New loss of taste or smell  Do not arrive for your surgery ill.  Your procedure will need to be rescheduled to another time.  You will need to call your physician before the day of surgery to avoid any unnecessary exposure to hospital staff as well as other patients.

## 2023-02-22 ENCOUNTER — HOSPITAL ENCOUNTER (OUTPATIENT)
Facility: HOSPITAL | Age: 68
Setting detail: HOSPITAL OUTPATIENT SURGERY
Discharge: HOME | End: 2023-02-22
Attending: OBSTETRICS & GYNECOLOGY | Admitting: OBSTETRICS & GYNECOLOGY
Payer: MEDICARE

## 2023-02-22 ENCOUNTER — ANESTHESIA EVENT (OUTPATIENT)
Dept: PERIOP | Facility: HOSPITAL | Age: 68
End: 2023-02-22
Payer: MEDICARE

## 2023-02-22 ENCOUNTER — ANESTHESIA (OUTPATIENT)
Dept: PERIOP | Facility: HOSPITAL | Age: 68
End: 2023-02-22
Payer: MEDICARE

## 2023-02-22 VITALS
RESPIRATION RATE: 16 BRPM | SYSTOLIC BLOOD PRESSURE: 113 MMHG | HEART RATE: 86 BPM | TEMPERATURE: 97.4 F | DIASTOLIC BLOOD PRESSURE: 68 MMHG | OXYGEN SATURATION: 93 %

## 2023-02-22 DIAGNOSIS — R93.89 THICKENED ENDOMETRIUM: ICD-10-CM

## 2023-02-22 DIAGNOSIS — N95.0 POSTMENOPAUSAL BLEEDING: ICD-10-CM

## 2023-02-22 DIAGNOSIS — R93.89 ABNORMAL RADIOGRAPHIC EXAMINATION: ICD-10-CM

## 2023-02-22 LAB
GLUCOSE BLDC GLUCOMTR-MCNC: 132 MG/DL (ref 70–130)
GLUCOSE BLDC GLUCOMTR-MCNC: 196 MG/DL (ref 70–130)

## 2023-02-22 PROCEDURE — 25010000002 FENTANYL CITRATE (PF) 50 MCG/ML SOLUTION: Performed by: NURSE ANESTHETIST, CERTIFIED REGISTERED

## 2023-02-22 PROCEDURE — 87086 URINE CULTURE/COLONY COUNT: CPT | Performed by: OBSTETRICS & GYNECOLOGY

## 2023-02-22 PROCEDURE — 25010000002 MIDAZOLAM PER 1 MG: Performed by: ANESTHESIOLOGY

## 2023-02-22 PROCEDURE — 25010000002 ONDANSETRON PER 1 MG: Performed by: NURSE ANESTHETIST, CERTIFIED REGISTERED

## 2023-02-22 PROCEDURE — 25010000002 KETOROLAC TROMETHAMINE PER 15 MG: Performed by: NURSE ANESTHETIST, CERTIFIED REGISTERED

## 2023-02-22 PROCEDURE — 25010000002 DEXAMETHASONE SODIUM PHOSPHATE 20 MG/5ML SOLUTION: Performed by: NURSE ANESTHETIST, CERTIFIED REGISTERED

## 2023-02-22 PROCEDURE — 82962 GLUCOSE BLOOD TEST: CPT

## 2023-02-22 PROCEDURE — 25010000002 CEFAZOLIN IN DEXTROSE 2-4 GM/100ML-% SOLUTION: Performed by: OBSTETRICS & GYNECOLOGY

## 2023-02-22 PROCEDURE — 25010000002 PROPOFOL 10 MG/ML EMULSION: Performed by: NURSE ANESTHETIST, CERTIFIED REGISTERED

## 2023-02-22 PROCEDURE — 88305 TISSUE EXAM BY PATHOLOGIST: CPT | Performed by: OBSTETRICS & GYNECOLOGY

## 2023-02-22 RX ORDER — KETOROLAC TROMETHAMINE 30 MG/ML
INJECTION, SOLUTION INTRAMUSCULAR; INTRAVENOUS AS NEEDED
Status: DISCONTINUED | OUTPATIENT
Start: 2023-02-22 | End: 2023-02-22 | Stop reason: SURG

## 2023-02-22 RX ORDER — HYDRALAZINE HYDROCHLORIDE 20 MG/ML
5 INJECTION INTRAMUSCULAR; INTRAVENOUS
Status: DISCONTINUED | OUTPATIENT
Start: 2023-02-22 | End: 2023-02-22 | Stop reason: HOSPADM

## 2023-02-22 RX ORDER — FENTANYL CITRATE 50 UG/ML
INJECTION, SOLUTION INTRAMUSCULAR; INTRAVENOUS AS NEEDED
Status: DISCONTINUED | OUTPATIENT
Start: 2023-02-22 | End: 2023-02-22 | Stop reason: SURG

## 2023-02-22 RX ORDER — FLUMAZENIL 0.1 MG/ML
0.2 INJECTION INTRAVENOUS AS NEEDED
Status: DISCONTINUED | OUTPATIENT
Start: 2023-02-22 | End: 2023-02-22 | Stop reason: HOSPADM

## 2023-02-22 RX ORDER — SODIUM CHLORIDE 0.9 % (FLUSH) 0.9 %
3 SYRINGE (ML) INJECTION EVERY 12 HOURS SCHEDULED
Status: DISCONTINUED | OUTPATIENT
Start: 2023-02-22 | End: 2023-02-22 | Stop reason: HOSPADM

## 2023-02-22 RX ORDER — HYDROCODONE BITARTRATE AND ACETAMINOPHEN 5; 325 MG/1; MG/1
1 TABLET ORAL ONCE AS NEEDED
Status: COMPLETED | OUTPATIENT
Start: 2023-02-22 | End: 2023-02-22

## 2023-02-22 RX ORDER — LIDOCAINE HYDROCHLORIDE 10 MG/ML
0.5 INJECTION, SOLUTION EPIDURAL; INFILTRATION; INTRACAUDAL; PERINEURAL ONCE AS NEEDED
Status: DISCONTINUED | OUTPATIENT
Start: 2023-02-22 | End: 2023-02-22 | Stop reason: HOSPADM

## 2023-02-22 RX ORDER — LIDOCAINE HYDROCHLORIDE 20 MG/ML
INJECTION, SOLUTION INFILTRATION; PERINEURAL AS NEEDED
Status: DISCONTINUED | OUTPATIENT
Start: 2023-02-22 | End: 2023-02-22 | Stop reason: SURG

## 2023-02-22 RX ORDER — PROMETHAZINE HYDROCHLORIDE 25 MG/1
25 SUPPOSITORY RECTAL ONCE AS NEEDED
Status: DISCONTINUED | OUTPATIENT
Start: 2023-02-22 | End: 2023-02-22 | Stop reason: HOSPADM

## 2023-02-22 RX ORDER — FAMOTIDINE 10 MG/ML
20 INJECTION, SOLUTION INTRAVENOUS ONCE
Status: COMPLETED | OUTPATIENT
Start: 2023-02-22 | End: 2023-02-22

## 2023-02-22 RX ORDER — HYDROMORPHONE HYDROCHLORIDE 1 MG/ML
0.25 INJECTION, SOLUTION INTRAMUSCULAR; INTRAVENOUS; SUBCUTANEOUS
Status: DISCONTINUED | OUTPATIENT
Start: 2023-02-22 | End: 2023-02-22 | Stop reason: HOSPADM

## 2023-02-22 RX ORDER — PHENYLEPHRINE HCL IN 0.9% NACL 1 MG/10 ML
SYRINGE (ML) INTRAVENOUS AS NEEDED
Status: DISCONTINUED | OUTPATIENT
Start: 2023-02-22 | End: 2023-02-22 | Stop reason: SURG

## 2023-02-22 RX ORDER — CEFAZOLIN SODIUM 2 G/100ML
2 INJECTION, SOLUTION INTRAVENOUS ONCE
Status: COMPLETED | OUTPATIENT
Start: 2023-02-22 | End: 2023-02-22

## 2023-02-22 RX ORDER — MIDAZOLAM HYDROCHLORIDE 1 MG/ML
1 INJECTION INTRAMUSCULAR; INTRAVENOUS
Status: DISCONTINUED | OUTPATIENT
Start: 2023-02-22 | End: 2023-02-22 | Stop reason: HOSPADM

## 2023-02-22 RX ORDER — MAGNESIUM HYDROXIDE 1200 MG/15ML
LIQUID ORAL AS NEEDED
Status: DISCONTINUED | OUTPATIENT
Start: 2023-02-22 | End: 2023-02-22 | Stop reason: HOSPADM

## 2023-02-22 RX ORDER — SODIUM CHLORIDE 0.9 % (FLUSH) 0.9 %
10 SYRINGE (ML) INJECTION AS NEEDED
Status: DISCONTINUED | OUTPATIENT
Start: 2023-02-22 | End: 2023-02-22 | Stop reason: HOSPADM

## 2023-02-22 RX ORDER — ONDANSETRON 2 MG/ML
INJECTION INTRAMUSCULAR; INTRAVENOUS AS NEEDED
Status: DISCONTINUED | OUTPATIENT
Start: 2023-02-22 | End: 2023-02-22 | Stop reason: SURG

## 2023-02-22 RX ORDER — EPHEDRINE SULFATE 50 MG/ML
5 INJECTION, SOLUTION INTRAVENOUS ONCE AS NEEDED
Status: DISCONTINUED | OUTPATIENT
Start: 2023-02-22 | End: 2023-02-22 | Stop reason: HOSPADM

## 2023-02-22 RX ORDER — DEXAMETHASONE SODIUM PHOSPHATE 4 MG/ML
INJECTION, SOLUTION INTRA-ARTICULAR; INTRALESIONAL; INTRAMUSCULAR; INTRAVENOUS; SOFT TISSUE AS NEEDED
Status: DISCONTINUED | OUTPATIENT
Start: 2023-02-22 | End: 2023-02-22 | Stop reason: SURG

## 2023-02-22 RX ORDER — SODIUM CHLORIDE 0.9 % (FLUSH) 0.9 %
3-10 SYRINGE (ML) INJECTION AS NEEDED
Status: DISCONTINUED | OUTPATIENT
Start: 2023-02-22 | End: 2023-02-22 | Stop reason: HOSPADM

## 2023-02-22 RX ORDER — ONDANSETRON 2 MG/ML
4 INJECTION INTRAMUSCULAR; INTRAVENOUS ONCE AS NEEDED
Status: DISCONTINUED | OUTPATIENT
Start: 2023-02-22 | End: 2023-02-22 | Stop reason: HOSPADM

## 2023-02-22 RX ORDER — OMEPRAZOLE 20 MG/1
20 CAPSULE, DELAYED RELEASE ORAL DAILY
COMMUNITY

## 2023-02-22 RX ORDER — NALOXONE HCL 0.4 MG/ML
0.2 VIAL (ML) INJECTION AS NEEDED
Status: DISCONTINUED | OUTPATIENT
Start: 2023-02-22 | End: 2023-02-22 | Stop reason: HOSPADM

## 2023-02-22 RX ORDER — PROMETHAZINE HYDROCHLORIDE 25 MG/1
25 TABLET ORAL ONCE AS NEEDED
Status: DISCONTINUED | OUTPATIENT
Start: 2023-02-22 | End: 2023-02-22 | Stop reason: HOSPADM

## 2023-02-22 RX ORDER — SODIUM CHLORIDE 0.9 % (FLUSH) 0.9 %
10 SYRINGE (ML) INJECTION EVERY 12 HOURS SCHEDULED
Status: DISCONTINUED | OUTPATIENT
Start: 2023-02-22 | End: 2023-02-22 | Stop reason: HOSPADM

## 2023-02-22 RX ORDER — HYDROCODONE BITARTRATE AND ACETAMINOPHEN 7.5; 325 MG/1; MG/1
1 TABLET ORAL EVERY 4 HOURS PRN
Status: DISCONTINUED | OUTPATIENT
Start: 2023-02-22 | End: 2023-02-22 | Stop reason: HOSPADM

## 2023-02-22 RX ORDER — PROPOFOL 10 MG/ML
VIAL (ML) INTRAVENOUS AS NEEDED
Status: DISCONTINUED | OUTPATIENT
Start: 2023-02-22 | End: 2023-02-22 | Stop reason: SURG

## 2023-02-22 RX ORDER — SODIUM CHLORIDE, SODIUM LACTATE, POTASSIUM CHLORIDE, CALCIUM CHLORIDE 600; 310; 30; 20 MG/100ML; MG/100ML; MG/100ML; MG/100ML
9 INJECTION, SOLUTION INTRAVENOUS CONTINUOUS
Status: DISCONTINUED | OUTPATIENT
Start: 2023-02-22 | End: 2023-02-22 | Stop reason: HOSPADM

## 2023-02-22 RX ORDER — ACETAMINOPHEN 500 MG
1000 TABLET ORAL ONCE
Status: COMPLETED | OUTPATIENT
Start: 2023-02-22 | End: 2023-02-22

## 2023-02-22 RX ORDER — GLYCOPYRROLATE 0.2 MG/ML
INJECTION INTRAMUSCULAR; INTRAVENOUS AS NEEDED
Status: DISCONTINUED | OUTPATIENT
Start: 2023-02-22 | End: 2023-02-22 | Stop reason: SURG

## 2023-02-22 RX ORDER — DIPHENHYDRAMINE HYDROCHLORIDE 50 MG/ML
12.5 INJECTION INTRAMUSCULAR; INTRAVENOUS
Status: DISCONTINUED | OUTPATIENT
Start: 2023-02-22 | End: 2023-02-22 | Stop reason: HOSPADM

## 2023-02-22 RX ORDER — LABETALOL HYDROCHLORIDE 5 MG/ML
5 INJECTION, SOLUTION INTRAVENOUS
Status: DISCONTINUED | OUTPATIENT
Start: 2023-02-22 | End: 2023-02-22 | Stop reason: HOSPADM

## 2023-02-22 RX ORDER — KETAMINE HCL IN NACL, ISO-OSM 100MG/10ML
SYRINGE (ML) INJECTION AS NEEDED
Status: DISCONTINUED | OUTPATIENT
Start: 2023-02-22 | End: 2023-02-22 | Stop reason: SURG

## 2023-02-22 RX ORDER — FENTANYL CITRATE 50 UG/ML
25 INJECTION, SOLUTION INTRAMUSCULAR; INTRAVENOUS
Status: DISCONTINUED | OUTPATIENT
Start: 2023-02-22 | End: 2023-02-22 | Stop reason: HOSPADM

## 2023-02-22 RX ADMIN — KETOROLAC TROMETHAMINE 30 MG: 30 INJECTION, SOLUTION INTRAMUSCULAR at 09:22

## 2023-02-22 RX ADMIN — ACETAMINOPHEN 1000 MG: 500 TABLET, FILM COATED ORAL at 07:04

## 2023-02-22 RX ADMIN — SODIUM CHLORIDE, POTASSIUM CHLORIDE, SODIUM LACTATE AND CALCIUM CHLORIDE 9 ML/HR: 600; 310; 30; 20 INJECTION, SOLUTION INTRAVENOUS at 06:49

## 2023-02-22 RX ADMIN — Medication 20 MG: at 08:16

## 2023-02-22 RX ADMIN — Medication 100 MCG: at 07:52

## 2023-02-22 RX ADMIN — FAMOTIDINE 20 MG: 10 INJECTION INTRAVENOUS at 07:04

## 2023-02-22 RX ADMIN — GLYCOPYRROLATE 0.2 MG: 0.2 INJECTION INTRAMUSCULAR; INTRAVENOUS at 07:51

## 2023-02-22 RX ADMIN — Medication 200 MCG: at 08:04

## 2023-02-22 RX ADMIN — MIDAZOLAM 1 MG: 1 INJECTION INTRAMUSCULAR; INTRAVENOUS at 07:05

## 2023-02-22 RX ADMIN — SODIUM CHLORIDE, POTASSIUM CHLORIDE, SODIUM LACTATE AND CALCIUM CHLORIDE: 600; 310; 30; 20 INJECTION, SOLUTION INTRAVENOUS at 08:39

## 2023-02-22 RX ADMIN — Medication 200 MCG: at 08:25

## 2023-02-22 RX ADMIN — HYDROCODONE BITARTRATE AND ACETAMINOPHEN 1 TABLET: 5; 325 TABLET ORAL at 10:13

## 2023-02-22 RX ADMIN — LIDOCAINE HYDROCHLORIDE 60 MG: 20 INJECTION, SOLUTION INFILTRATION; PERINEURAL at 07:35

## 2023-02-22 RX ADMIN — DEXAMETHASONE SODIUM PHOSPHATE 8 MG: 4 INJECTION, SOLUTION INTRAMUSCULAR; INTRAVENOUS at 07:42

## 2023-02-22 RX ADMIN — ONDANSETRON 4 MG: 2 INJECTION INTRAMUSCULAR; INTRAVENOUS at 08:02

## 2023-02-22 RX ADMIN — PROPOFOL 50 MG: 10 INJECTION, EMULSION INTRAVENOUS at 08:44

## 2023-02-22 RX ADMIN — Medication 200 MCG: at 07:56

## 2023-02-22 RX ADMIN — FENTANYL CITRATE 50 MCG: 0.05 INJECTION, SOLUTION INTRAMUSCULAR; INTRAVENOUS at 09:05

## 2023-02-22 RX ADMIN — PROPOFOL 180 MG: 10 INJECTION, EMULSION INTRAVENOUS at 07:35

## 2023-02-22 RX ADMIN — CEFAZOLIN SODIUM 2 G: 2 INJECTION, SOLUTION INTRAVENOUS at 07:25

## 2023-02-22 RX ADMIN — Medication 100 MCG: at 07:45

## 2023-02-22 RX ADMIN — FENTANYL CITRATE 50 MCG: 0.05 INJECTION, SOLUTION INTRAMUSCULAR; INTRAVENOUS at 07:35

## 2023-02-22 NOTE — ANESTHESIA PROCEDURE NOTES
Airway  Urgency: elective    Date/Time: 2/22/2023 7:36 AM    General Information and Staff    Patient location during procedure: OR  Anesthesiologist: Jose Valencia MD  CRNA/CAA: Azul Mitchell CRNA    Indications and Patient Condition  Indications for airway management: airway protection    Preoxygenated: yes  Mask difficulty assessment: 1 - vent by mask    Final Airway Details  Final airway type: supraglottic airway      Successful airway: LMA  Size 4     Number of attempts at approach: 1  Assessment: lips, teeth, and gum same as pre-op and atraumatic intubation

## 2023-02-22 NOTE — ANESTHESIA POSTPROCEDURE EVALUATION
Patient: Magui Dumont    Procedure Summary     Date: 02/22/23 Room / Location: [facility] OR [address] [place] [address] MAIN OR    Anesthesia Start: 0731 Anesthesia Stop: 0934    Procedure: HYSTEROSCOPY with fractional DILATATION AND CURETTAGE pap smear cystourethroscopy (Uterus) Diagnosis:       Postmenopausal bleeding      Abnormal radiographic examination      Thickened endometrium      (Postmenopausal bleeding [N95.0])      (Abnormal radiographic examination [R93.89])      (Thickened endometrium [R93.89])    Surgeons: Magui Ribeiro MD Provider: Jose Valencia MD    Anesthesia Type: general ASA Status: 2          Anesthesia Type: general    Vitals  Vitals Value Taken Time   /77 02/22/23 1031   Temp 36.3 °C (97.4 °F) 02/22/23 0931   Pulse 98 02/22/23 1033   Resp 18 02/22/23 1030   SpO2 93 % 02/22/23 1033   Vitals shown include unvalidated device data.        Post Anesthesia Care and Evaluation    Patient location during evaluation: PACU  Patient participation: complete - patient participated  Level of consciousness: awake and alert  Pain management: adequate    Airway patency: patent  Anesthetic complications: No anesthetic complications    Cardiovascular status: acceptable  Respiratory status: acceptable  Hydration status: acceptable    Comments: --------------------            02/22/23               1045     --------------------   BP:       131/70     Pulse:      93       Resp:                Temp:                SpO2:      96%      --------------------

## 2023-02-22 NOTE — ANESTHESIA PREPROCEDURE EVALUATION
Anesthesia Evaluation     Patient summary reviewed and Nursing notes reviewed   history of anesthetic complications: PONV  NPO Solid Status: > 6 hours  NPO Liquid Status: > 2 hours           Airway   Mallampati: II  TM distance: <3 FB  Neck ROM: full  Possible difficult intubation and Anterior  Dental - normal exam     Pulmonary    (-) COPD, asthma, sleep apnea, not a smoker  Cardiovascular   Exercise tolerance: good (4-7 METS)    ECG reviewed    (+) hypertension, hyperlipidemia,   (-) CAD, dysrhythmias, angina, cardiac stents      Neuro/Psych  (+) psychiatric history Depression,    (-) seizures, CVA  GI/Hepatic/Renal/Endo    (+)  GERD well controlled,  diabetes mellitus type 2, thyroid problem hypothyroidism  (-) liver disease, no renal disease    Musculoskeletal     Abdominal    Substance History      OB/GYN          Other                        Anesthesia Plan    ASA 2     general       Anesthetic plan, risks, benefits, and alternatives have been provided, discussed and informed consent has been obtained with: patient.        CODE STATUS:

## 2023-02-23 LAB — BACTERIA SPEC AEROBE CULT: NO GROWTH

## 2023-02-24 LAB
LAB AP CASE REPORT: NORMAL
PATH REPORT.FINAL DX SPEC: NORMAL
PATH REPORT.GROSS SPEC: NORMAL

## 2023-02-28 ENCOUNTER — TELEPHONE (OUTPATIENT)
Dept: INTERNAL MEDICINE | Age: 68
End: 2023-02-28

## 2023-02-28 NOTE — TELEPHONE ENCOUNTER
PATIENT HAD A MISSED CALL FROM OFFICE, NO MESSAGE WAS LEFT SO SHE ISN'T SURE WHO CALLED OR IF WE NEEDED HER IF SO PLEASE CALL PATIENT BACK IF NOT DISREGARD.     PATIENT> [phone]

## 2023-03-09 NOTE — OP NOTE
[facility] OPERATIVE REPORT    PATIENT NAME: Magui Dumont      :   1955     DATE OF OPERATION:   2023     PREOPERATIVE DIAGNOSES:     1. Postmenopausal bleeding, N95.  2. Endometrial thickening, R93.89     POSTOPERATIVE DIAGNOSES: Same plus      SURGEON: Magui Bernard M.D. MSc     PROCEDURES:   1. Hysteroscopy with fractional dilatation and curettage, diagnostic, 47114   2. Pap        3. Cystourethroscopy, 79703     FINDING(S): There were no lesions on the endocervical hysteroscopic examination.  Other than a polyp.  Cystourethroscopy showed no lesions or foreign material of the bladder or the urethra.      SPECIMEN(S):   1. Endocervical curettings                                         2. Endometrial curettings with polyp                        DESCRIPTION OF PROCEDURE(S): The patient was taken to the Operating Room with a peripheral IV in place. She underwent the induction of anesthesia. The patient was prepped and draped in the dorsal lithotomy position in [facility].  The cervix was grasped with a tenaculum and dilated. The hysteroscope was inserted and the uterine cavity distended with fluid. Findings were as noted above.  The hysteroscope was removed. Endocervix curettings were obtained. Endometrial curettings were obtained and sent to Pathology. The hysteroscope was inserted again and there was no bleeding and the uterine and cervical cavities were intact.  The hysteroscope was removed. The tenaculum was removed. The cervix was hemostatic. Cystourethroscopy showed no lesions or foreign material of the bladder or the urethra. The patient was taken out of the dorsal lithotomy position, awakened from anesthesia and taken to the Recovery Room in good condition. All final needle, sponge and instrument counts were reported as correct. There were no complications to the procedure.    ESTIMATED BLOOD LOSS: 10 mL     URINE OUTPUT: 520 mL        MAGUI BERNARD MD, MSc, FACOG,  FACS

## 2023-03-16 LAB — REF LAB TEST METHOD: NORMAL

## 2023-03-31 ENCOUNTER — OFFICE VISIT (OUTPATIENT)
Dept: INTERNAL MEDICINE | Age: 68
End: 2023-03-31
Payer: MEDICARE

## 2023-03-31 VITALS
HEIGHT: 67 IN | WEIGHT: 155 LBS | TEMPERATURE: 97.1 F | SYSTOLIC BLOOD PRESSURE: 126 MMHG | BODY MASS INDEX: 24.33 KG/M2 | HEART RATE: 86 BPM | OXYGEN SATURATION: 99 % | DIASTOLIC BLOOD PRESSURE: 62 MMHG

## 2023-03-31 DIAGNOSIS — F32.A DEPRESSION, UNSPECIFIED DEPRESSION TYPE: ICD-10-CM

## 2023-03-31 DIAGNOSIS — E55.9 VITAMIN D DEFICIENCY: ICD-10-CM

## 2023-03-31 DIAGNOSIS — I10 BENIGN ESSENTIAL HYPERTENSION: ICD-10-CM

## 2023-03-31 DIAGNOSIS — E11.9 CONTROLLED TYPE 2 DIABETES MELLITUS WITHOUT COMPLICATION: ICD-10-CM

## 2023-03-31 DIAGNOSIS — E11.65 TYPE 2 DIABETES MELLITUS WITH HYPERGLYCEMIA, WITH LONG-TERM CURRENT USE OF INSULIN: Primary | Chronic | ICD-10-CM

## 2023-03-31 DIAGNOSIS — E78.5 DYSLIPIDEMIA: ICD-10-CM

## 2023-03-31 DIAGNOSIS — Z79.4 TYPE 2 DIABETES MELLITUS WITH HYPERGLYCEMIA, WITH LONG-TERM CURRENT USE OF INSULIN: Primary | Chronic | ICD-10-CM

## 2023-03-31 LAB — HBA1C MFR BLD: 7.4 % (ref 4.8–5.6)

## 2023-03-31 RX ORDER — INSULIN GLARGINE AND LIXISENATIDE 100; 33 U/ML; UG/ML
55 INJECTION, SOLUTION SUBCUTANEOUS DAILY
Qty: 15 ML | Refills: 3 | Status: SHIPPED | OUTPATIENT
Start: 2023-03-31

## 2023-03-31 RX ORDER — ROSUVASTATIN CALCIUM 20 MG/1
20 TABLET, COATED ORAL NIGHTLY
Qty: 90 TABLET | Refills: 3 | Status: SHIPPED | OUTPATIENT
Start: 2023-03-31

## 2023-03-31 RX ORDER — CITALOPRAM 20 MG/1
20 TABLET ORAL DAILY
Qty: 90 TABLET | Refills: 1 | Status: SHIPPED | OUTPATIENT
Start: 2023-03-31

## 2023-03-31 RX ORDER — LISINOPRIL AND HYDROCHLOROTHIAZIDE 20; 12.5 MG/1; MG/1
1 TABLET ORAL DAILY
COMMUNITY
Start: 2023-03-31

## 2023-03-31 NOTE — PROGRESS NOTES
I N T E R N A L  M E D I C I N E    J U N O H  K I M,  M D      ENCOUNTER DATE:  03/31/2023    Magui Dumont / 67 y.o. / female    CHIEF COMPLAINT / REASON FOR OFFICE VISIT     Diabetes, Hypertension, and Hypothyroidism      ASSESSMENT & PLAN     Problem List Items Addressed This Visit        High    Type 2 diabetes mellitus with hyperglycemia, with long-term current use of insulin (HCC) - Primary (Chronic)    Overview     * Previously managed by endocrinologist (Bartolo). Will not be seeing endocrinology going forward.          Relevant Medications    glucose blood test strip    lisinopril-hydrochlorothiazide (PRINZIDE,ZESTORETIC) 20-12.5 MG per tablet    ReliOn Pen Needles 32G X 4 MM misc    metFORMIN (GLUCOPHAGE) 1000 MG tablet    vitamin D (ERGOCALCIFEROL) 1.25 MG (64880 UT) capsule capsule    Soliqua 100-33 UNT-MCG/ML solution pen-injector injection    Other Relevant Orders    Hemoglobin A1c    Hemoglobin A1c       Medium    Dyslipidemia (Chronic)    Relevant Medications    lisinopril-hydrochlorothiazide (PRINZIDE,ZESTORETIC) 20-12.5 MG per tablet    vitamin D (ERGOCALCIFEROL) 1.25 MG (51822 UT) capsule capsule    rosuvastatin (CRESTOR) 20 MG tablet       Low    Depression (Chronic)    Overview     Continue citalopram 20 mg qd.          Relevant Medications    citalopram (CeleXA) 20 MG tablet     Orders Placed This Encounter   Procedures   • Hemoglobin A1c   • Hemoglobin A1c     New Medications Ordered This Visit   Medications   • citalopram (CeleXA) 20 MG tablet     Sig: Take 1 tablet by mouth Daily.     Dispense:  90 tablet     Refill:  1   • rosuvastatin (CRESTOR) 20 MG tablet     Sig: Take 1 tablet by mouth Every Night.     Dispense:  90 tablet     Refill:  3     Please consider 90 day supplies to promote better adherence   • Soliqua 100-33 UNT-MCG/ML solution pen-injector injection     Sig: Inject 55 Units under the skin into the appropriate area as directed Daily.     Dispense:  15 mL     Refill:  3  "      SUMMARY/DISCUSSION  •       Next Appointment with me: Visit date not found    Return in about 6 months (around 9/30/2023) for Reassess chronic medical problems.        VITAL SIGNS     Vitals:    03/31/23 1336   BP: 126/62   Pulse: 86   Temp: 97.1 °F (36.2 °C)   SpO2: 99%   Weight: 70.3 kg (155 lb)   Height: 170.2 cm (67\")       BP Readings from Last 3 Encounters:   03/31/23 126/62   02/22/23 113/68   02/17/23 163/91     Wt Readings from Last 3 Encounters:   03/31/23 70.3 kg (155 lb)   02/17/23 73.3 kg (161 lb 11.2 oz)   11/30/22 73.5 kg (162 lb)     Body mass index is 24.28 kg/m².    Blood pressure readings recorded on patient flowsheet:  No flowsheet data found.     MEDICATIONS AT THE TIME OF OFFICE VISIT     Current Outpatient Medications on File Prior to Visit   Medication Sig   • citalopram (CeleXA) 20 MG tablet Take 1 tablet by mouth Daily.   • glucose blood test strip OneTouch Verio In Vitro Strip; Patient Sig: CHECK BLOOD GLUCOSE 1 TIME A DAY; E11.65   • Hydrocortisone, Perianal, (ANUSOL-HC) 2.5 % rectal cream Insert  into the rectum 2 (Two) Times a Day. (Patient taking differently: Insert  into the rectum 2 (Two) Times a Day As Needed.)   • levothyroxine (SYNTHROID, LEVOTHROID) 100 MCG tablet Take 1 tablet by mouth once daily (Patient taking differently: Take 1 tablet by mouth Every Morning.)   • lisinopril-hydrochlorothiazide (PRINZIDE,ZESTORETIC) 20-12.5 MG per tablet Take 1 tablet by mouth once daily   • metFORMIN (GLUCOPHAGE) 1000 MG tablet TAKE 1 TABLET BY MOUTH TWICE DAILY WITH MEALS   • nystatin 888235 UNIT/GM powder APPLY  POWDER TOPICALLY TO AFFECTED AREA AS DIRECTED TWICE DAILY   • Omega-3 Fatty Acids (FISH OIL) 1000 MG capsule delayed-release Take  by mouth daily.   • omeprazole (priLOSEC) 20 MG capsule Take 1 capsule by mouth Daily.   • ONETOUCH DELICA LANCETS FINE misc    • ReliOn Pen Needles 32G X 4 MM misc USE 1 PEN NEEDLE ONCE DAILY   • rosuvastatin (CRESTOR) 20 MG tablet Take 1 tablet " by mouth Every Night.   • Soliqua 100-33 UNT-MCG/ML solution pen-injector injection INJECT 55  SUBCUTANEOUSLY AS DIRECTED IN THE MORNING   • vitamin D (ERGOCALCIFEROL) 1.25 MG (49293 UT) capsule capsule Take 1 capsule by mouth once a week     No current facility-administered medications on file prior to visit.         HISTORY OF PRESENT ILLNESS     She reports that her blood sugars at home are better. Has been watching her diet better. Weight is 6 lbs down. On Soliqua 55 units and metformin.        Lab Results   Component Value Date    HGBA1C 7.5 07/20/2022    HGBA1C 9.6 (H) 05/16/2022    HGBA1C 7.8 11/15/2021    CREATININE 0.65 02/17/2023    LDL 80 07/20/2022    MALBCRERATIO <9 03/30/2021     Blood sugar readings recorded on patient's flowsheet:  No flowsheet data found.    70.3 kg (155 lb)    REVIEW OF SYSTEMS     Constitutional neg except per HPI   Resp neg  CV neg   GI neg       PHYSICAL EXAMINATION     Physical Exam  Feet:      Comments: Diabetic Foot Exam Performed and Monofilament Test Performed    Monofilament test is normal.       General: No acute distress  Psych: Normal thought and judgment       REVIEWED DATA     Labs:       Lab Results   Component Value Date     (L) 02/17/2023    K 3.9 02/17/2023    CALCIUM 9.9 02/17/2023    AST 25 11/15/2021    ALT 26 11/15/2021    BUN 9 02/17/2023    CREATININE 0.65 02/17/2023    CREATININE 0.50 (L) 10/27/2022    CREATININE 0.77 05/16/2022    EGFRIFNONA 78 11/15/2021    EGFRIFAFRI 90 11/15/2021       Lab Results   Component Value Date    HGBA1C 7.5 07/20/2022    HGBA1C 9.6 (H) 05/16/2022    HGBA1C 7.8 11/15/2021       Lab Results   Component Value Date    LDL 80 07/20/2022    LDL 68 03/30/2021    LDL 67 10/06/2020    HDL 42 07/20/2022    HDL 47 03/30/2021    TRIG 131 07/20/2022    TRIG 135 03/30/2021       Lab Results   Component Value Date    TSH 1.160 07/20/2022    TSH 2.200 08/11/2021    TSH 0.252 (L) 03/30/2021    FREET4 1.52 07/20/2022    FREET4 1.63  08/11/2021    FREET4 1.95 (H) 10/06/2020       Lab Results   Component Value Date    WBC 9.47 02/17/2023    HGB 14.7 02/17/2023     02/17/2023       Lab Results   Component Value Date    MALBCRERATIO <9 03/30/2021          Imaging:           Medical Tests:           Summary of old records / correspondence / consultant report:           Request outside records:           *Examiner was wearing KN95 mask during the entire duration of the visit. Patient was masked the entire time. Minimum social distance of 6 ft maintained entire visit except if physical contact was necessary as documented.       Template created by Bear Brown MD

## 2023-03-31 NOTE — ASSESSMENT & PLAN NOTE
Check A1c today.     Continue metformin and Soliqua 55 units daily.     Lab Results   Component Value Date    HGBA1C 7.5 07/20/2022    HGBA1C 9.6 (H) 05/16/2022    HGBA1C 7.8 11/15/2021

## 2023-04-14 DIAGNOSIS — E11.9 TYPE 2 DIABETES MELLITUS WITHOUT COMPLICATION, WITH LONG-TERM CURRENT USE OF INSULIN: ICD-10-CM

## 2023-04-14 DIAGNOSIS — E55.9 VITAMIN D DEFICIENCY: ICD-10-CM

## 2023-04-14 DIAGNOSIS — Z79.4 TYPE 2 DIABETES MELLITUS WITHOUT COMPLICATION, WITH LONG-TERM CURRENT USE OF INSULIN: ICD-10-CM

## 2023-04-14 DIAGNOSIS — E03.9 HYPOTHYROIDISM, UNSPECIFIED TYPE: ICD-10-CM

## 2023-04-14 DIAGNOSIS — E78.5 DYSLIPIDEMIA: Chronic | ICD-10-CM

## 2023-04-14 DIAGNOSIS — I10 BENIGN ESSENTIAL HYPERTENSION: Chronic | ICD-10-CM

## 2023-04-14 RX ORDER — ERGOCALCIFEROL 1.25 MG/1
50000 CAPSULE ORAL WEEKLY
Qty: 4 CAPSULE | Refills: 0 | Status: SHIPPED | OUTPATIENT
Start: 2023-04-14

## 2023-05-20 DIAGNOSIS — E78.5 DYSLIPIDEMIA: Chronic | ICD-10-CM

## 2023-05-20 DIAGNOSIS — Z79.4 TYPE 2 DIABETES MELLITUS WITHOUT COMPLICATION, WITH LONG-TERM CURRENT USE OF INSULIN: ICD-10-CM

## 2023-05-20 DIAGNOSIS — I10 BENIGN ESSENTIAL HYPERTENSION: Chronic | ICD-10-CM

## 2023-05-20 DIAGNOSIS — E55.9 VITAMIN D DEFICIENCY: ICD-10-CM

## 2023-05-20 DIAGNOSIS — E11.9 TYPE 2 DIABETES MELLITUS WITHOUT COMPLICATION, WITH LONG-TERM CURRENT USE OF INSULIN: ICD-10-CM

## 2023-05-20 DIAGNOSIS — E03.9 HYPOTHYROIDISM, UNSPECIFIED TYPE: ICD-10-CM

## 2023-05-22 RX ORDER — ERGOCALCIFEROL 1.25 MG/1
50000 CAPSULE ORAL WEEKLY
Qty: 4 CAPSULE | Refills: 0 | Status: SHIPPED | OUTPATIENT
Start: 2023-05-22

## 2023-06-10 DIAGNOSIS — E55.9 VITAMIN D DEFICIENCY: ICD-10-CM

## 2023-06-10 DIAGNOSIS — I10 BENIGN ESSENTIAL HYPERTENSION: Chronic | ICD-10-CM

## 2023-06-10 DIAGNOSIS — E11.9 TYPE 2 DIABETES MELLITUS WITHOUT COMPLICATION, WITH LONG-TERM CURRENT USE OF INSULIN: ICD-10-CM

## 2023-06-10 DIAGNOSIS — E03.9 HYPOTHYROIDISM, UNSPECIFIED TYPE: ICD-10-CM

## 2023-06-10 DIAGNOSIS — E78.5 DYSLIPIDEMIA: Chronic | ICD-10-CM

## 2023-06-10 DIAGNOSIS — Z79.4 TYPE 2 DIABETES MELLITUS WITHOUT COMPLICATION, WITH LONG-TERM CURRENT USE OF INSULIN: ICD-10-CM

## 2023-06-12 RX ORDER — ERGOCALCIFEROL 1.25 MG/1
50000 CAPSULE ORAL WEEKLY
Qty: 4 CAPSULE | Refills: 0 | Status: SHIPPED | OUTPATIENT
Start: 2023-06-12

## 2023-07-28 ENCOUNTER — DOCUMENTATION (OUTPATIENT)
Dept: INTERNAL MEDICINE | Age: 68
End: 2023-07-28
Payer: MEDICARE

## 2023-07-28 NOTE — ASSESSMENT & PLAN NOTE
Have her discontinue vitamin D prescription. Start vitamin D3 2000 IU daily OTC (add to medication list ; Dx: Vitamin D deficiency). Will recheck vitamin D level on follow-up.     Lab Results   Component Value Date    FGGV78AO 55.2 03/30/2021    GZTT53IH 51.3 10/06/2020    OZXT15WQ 40.4 02/10/2020

## 2023-08-07 ENCOUNTER — PREP FOR SURGERY (OUTPATIENT)
Dept: OTHER | Facility: HOSPITAL | Age: 68
End: 2023-08-07
Payer: MEDICARE

## 2023-08-07 DIAGNOSIS — N81.2 UTEROVAGINAL PROLAPSE, INCOMPLETE: Primary | ICD-10-CM

## 2023-08-07 DIAGNOSIS — N39.3 FEMALE STRESS INCONTINENCE: ICD-10-CM

## 2023-08-07 DIAGNOSIS — N81.5 VAGINAL ENTEROCELE: ICD-10-CM

## 2023-08-07 DIAGNOSIS — N81.6 RECTOCELE: ICD-10-CM

## 2023-08-07 DIAGNOSIS — N81.12 CYSTOCELE, LATERAL: ICD-10-CM

## 2023-08-07 DIAGNOSIS — N81.11 CYSTOCELE, MIDLINE: ICD-10-CM

## 2023-08-07 RX ORDER — SODIUM CHLORIDE 0.9 % (FLUSH) 0.9 %
10 SYRINGE (ML) INJECTION EVERY 12 HOURS SCHEDULED
OUTPATIENT
Start: 2023-08-17

## 2023-08-07 RX ORDER — CEFAZOLIN SODIUM 2 G/100ML
2000 INJECTION, SOLUTION INTRAVENOUS ONCE
OUTPATIENT
Start: 2023-08-17 | End: 2023-08-07

## 2023-08-07 RX ORDER — PHENAZOPYRIDINE HYDROCHLORIDE 200 MG/1
200 TABLET, FILM COATED ORAL ONCE
OUTPATIENT
Start: 2023-08-17 | End: 2023-08-07

## 2023-08-07 RX ORDER — SODIUM CHLORIDE 9 MG/ML
40 INJECTION, SOLUTION INTRAVENOUS AS NEEDED
OUTPATIENT
Start: 2023-08-17

## 2023-08-07 RX ORDER — SODIUM CHLORIDE 0.9 % (FLUSH) 0.9 %
10 SYRINGE (ML) INJECTION AS NEEDED
OUTPATIENT
Start: 2023-08-17

## 2023-08-09 ENCOUNTER — PRE-ADMISSION TESTING (OUTPATIENT)
Dept: PREADMISSION TESTING | Facility: HOSPITAL | Age: 68
End: 2023-08-09
Payer: MEDICARE

## 2023-08-09 VITALS
HEIGHT: 67 IN | HEART RATE: 107 BPM | WEIGHT: 159.2 LBS | SYSTOLIC BLOOD PRESSURE: 138 MMHG | OXYGEN SATURATION: 97 % | DIASTOLIC BLOOD PRESSURE: 83 MMHG | TEMPERATURE: 97.8 F | BODY MASS INDEX: 24.99 KG/M2 | RESPIRATION RATE: 16 BRPM

## 2023-08-09 LAB
ABO GROUP BLD: NORMAL
ANION GAP SERPL CALCULATED.3IONS-SCNC: 13 MMOL/L (ref 5–15)
BLD GP AB SCN SERPL QL: NEGATIVE
BUN SERPL-MCNC: 15 MG/DL (ref 8–23)
BUN/CREAT SERPL: 18.8 (ref 7–25)
CALCIUM SPEC-SCNC: 9.7 MG/DL (ref 8.6–10.5)
CHLORIDE SERPL-SCNC: 101 MMOL/L (ref 98–107)
CO2 SERPL-SCNC: 24 MMOL/L (ref 22–29)
CREAT SERPL-MCNC: 0.8 MG/DL (ref 0.57–1)
DEPRECATED RDW RBC AUTO: 42.8 FL (ref 37–54)
EGFRCR SERPLBLD CKD-EPI 2021: 80.4 ML/MIN/1.73
ERYTHROCYTE [DISTWIDTH] IN BLOOD BY AUTOMATED COUNT: 13 % (ref 12.3–15.4)
GLUCOSE SERPL-MCNC: 149 MG/DL (ref 65–99)
HCT VFR BLD AUTO: 42.6 % (ref 34–46.6)
HGB BLD-MCNC: 14.7 G/DL (ref 12–15.9)
MCH RBC QN AUTO: 30.8 PG (ref 26.6–33)
MCHC RBC AUTO-ENTMCNC: 34.5 G/DL (ref 31.5–35.7)
MCV RBC AUTO: 89.3 FL (ref 79–97)
PLATELET # BLD AUTO: 187 10*3/MM3 (ref 140–450)
PMV BLD AUTO: 8.8 FL (ref 6–12)
POTASSIUM SERPL-SCNC: 4.2 MMOL/L (ref 3.5–5.2)
QT INTERVAL: 386 MS
RBC # BLD AUTO: 4.77 10*6/MM3 (ref 3.77–5.28)
RH BLD: POSITIVE
SODIUM SERPL-SCNC: 138 MMOL/L (ref 136–145)
T&S EXPIRATION DATE: NORMAL
WBC NRBC COR # BLD: 7.39 10*3/MM3 (ref 3.4–10.8)

## 2023-08-09 PROCEDURE — 85027 COMPLETE CBC AUTOMATED: CPT

## 2023-08-09 PROCEDURE — 80048 BASIC METABOLIC PNL TOTAL CA: CPT

## 2023-08-09 PROCEDURE — 36415 COLL VENOUS BLD VENIPUNCTURE: CPT

## 2023-08-09 PROCEDURE — 93010 ELECTROCARDIOGRAM REPORT: CPT | Performed by: INTERNAL MEDICINE

## 2023-08-09 PROCEDURE — 93005 ELECTROCARDIOGRAM TRACING: CPT

## 2023-08-09 PROCEDURE — 86900 BLOOD TYPING SEROLOGIC ABO: CPT | Performed by: OBSTETRICS & GYNECOLOGY

## 2023-08-09 PROCEDURE — 86850 RBC ANTIBODY SCREEN: CPT | Performed by: OBSTETRICS & GYNECOLOGY

## 2023-08-09 PROCEDURE — 86901 BLOOD TYPING SEROLOGIC RH(D): CPT | Performed by: OBSTETRICS & GYNECOLOGY

## 2023-08-09 RX ORDER — CHLORHEXIDINE GLUCONATE 500 MG/1
CLOTH TOPICAL
Status: ON HOLD | COMMUNITY
End: 2023-08-17

## 2023-08-09 NOTE — DISCHARGE INSTRUCTIONS
Take the following medications the morning of surgery:       If you are on prescription narcotic pain medication to control your pain you may also take that medication the morning of surgery.    General Instructions:  Do not eat solid food after midnight the night before surgery.  You may drink clear liquids day of surgery but must stop at least one hour before your hospital arrival time.  It is beneficial for you to have a clear drink that contains carbohydrates the day of surgery.  We suggest a 12 to 20 ounce bottle of Gatorade or Powerade for non-diabetic patients or a 12 to 20 ounce bottle of G2 or Powerade Zero for diabetic patients.     Clear liquids are liquids you can see through.  Nothing red in color.     Plain water                               Sports drinks  Sodas                                   Gelatin (Jell-O)  Fruit juices without pulp such as white grape juice and apple juice  Popsicles that contain no fruit or yogurt  Tea or coffee (no cream or milk added)  Gatorade / Powerade  G2 / Powerade Zero    Bring any papers given to you in the doctor's office.  Wear clean comfortable clothes.  Do not wear contact lenses, false eyelashes or make-up.  Bring a case for your glasses.   Remove all piercings.  Leave jewelry and any other valuables at home.  The Pre-Admission Testing nurse will instruct you to bring medications if unable to obtain an accurate list in Pre-Admission Testing.        If you were given a blood bank ID arm band remember to bring it with you the day of surgery.    Preventing a Surgical Site Infection:  For 2 to 3 days before surgery, avoid shaving with a razor because the razor can irritate skin and make it easier to develop an infection.    Any areas of open skin can increase the risk of a post-operative wound infection by allowing bacteria to enter and travel throughout the body.  Notify your surgeon if you have any skin wounds / rashes even if it is not near the expected surgical  site.  The area will need assessed to determine if surgery should be delayed until it is healed.  The night prior to surgery shower using a fresh bar of anti-bacterial soap (such as Dial) and clean washcloth.  Sleep in a clean bed with clean clothing.  Do not allow pets to sleep with you.  Shower on the morning of surgery using a fresh bar of anti-bacterial soap (such as Dial) and clean washcloth.  Dry with a clean towel and dress in clean clothing.  Ask your surgeon if you will be receiving antibiotics prior to surgery.  Make sure you, your family, and all healthcare providers clean their hands with soap and water or an alcohol based hand  before caring for you or your wound.    CHLORHEXIDINE CLOTH INSTRUCTIONS  The morning of surgery follow these instructions using the Chlorhexidine cloths you've been given.  These steps reduce bacteria on the body.  Do not use the cloths near your eyes, ears mouth, genitalia or on open wounds.  Throw the cloths away after use but do not try to flush them down a toilet.      Open and remove one cloth at a time from the package.    Leave the cloth unfolded and begin the bathing.  Massage the skin with the cloths using gentle pressure to remove bacteria.  Do not scrub harshly.   Follow the steps below with one 2% CHG cloth per area (6 total cloths).  One cloth for neck, shoulders and chest.  One cloth for both arms, hands, fingers and underarms (do underarms last).  One cloth for the abdomen followed by groin.  One cloth for right leg and foot including between the toes.  One cloth for left leg and foot including between the toes.  The last cloth is to be used for the back of the neck, back and buttocks.    Allow the CHG to air dry 3 minutes on the skin which will give it time to work and decrease the chance of irritation.  The skin may feel sticky until it is dry.  Do not rinse with water or any other liquid or you will lose the beneficial effects of the CHG.  If mild skin  irritation occurs, do rinse the skin to remove the CHG.  Report this to the nurse at time of admission.  Do not apply lotions, creams, ointments, deodorants or perfumes after using the clothes. Dress in clean clothes before coming to the hospital.    Day of surgery:  Your arrival time is approximately two hours before your scheduled surgery time.  Upon arrival, a Pre-op nurse and Anesthesiologist will review your health history, obtain vital signs, and answer questions you may have.  The only belongings needed at this time will be a list of your home medications and if applicable your C-PAP/BI-PAP machine.  A Pre-op nurse will start an IV and you may receive medication in preparation for surgery, including something to help you relax.     Please be aware that surgery does come with discomfort.  We want to make every effort to control your discomfort so please discuss any uncontrolled symptoms with your nurse.   Your doctor will most likely have prescribed pain medications.      If you are going home after surgery you will receive individualized written care instructions before being discharged.  A responsible adult must drive you to and from the hospital on the day of your surgery and stay with you for 24 hours.  Discharge prescriptions can be filled by the hospital pharmacy during regular pharmacy hours.  If you are having surgery late in the day/evening your prescription may be e-prescribed to your pharmacy.  Please verify your pharmacy hours or chose a 24 hour pharmacy to avoid not having access to your prescription because your pharmacy has closed for the day.    If you are staying overnight following surgery, you will be transported to your hospital room following the recovery period.  [facility] has all private rooms.    If you have any questions please call Pre-Admission Testing at [phone].  Deductibles and co-payments are collected on the day of service. Please be prepared to pay the  required co-pay, deductible or deposit on the day of service as defined by your plan.    Call your surgeon immediately if you experience any of the following symptoms:  Sore Throat  Shortness of Breath or difficulty breathing  Cough  Chills  Body soreness or muscle pain  Headache  Fever  New loss of taste or smell  Do not arrive for your surgery ill.  Your procedure will need to be rescheduled to another time.  You will need to call your physician before the day of surgery to avoid any unnecessary exposure to hospital staff as well as other patients.

## 2023-08-14 NOTE — H&P
Female Pelvic Medicine and Reconstructive Surgery   History & Physical    Patient Identification:  Name: Magui Dumont Age: 67 y.o. Sex: female :  1955 MRN: Female Pelvic Medicine and Reconstructive Surgery   History & Physical    Patient Identification:  Name: Magui Dumont Age: 67 y.o. Sex: female :  1955 MRN: [id number]                            Problem List:    Uterovaginal prolapse, incomplete    Cystocele, midline    Cystocele, lateral    Vaginal enterocele    Rectocele    Female stress incontinence    Past Medical History:  Past Medical History:   Diagnosis Date    Hyperlipidemia     Hypertension     Hypothyroidism     Incontinence     PONV (postoperative nausea and vomiting)     Seasonal allergic rhinitis     Type 2 diabetes mellitus     Uterovaginal prolapse     CYTOCELE, VAGINAL ENTEROCELE AND RECTOCELE    Vitamin D deficiency      Past Surgical History:  Past Surgical History:   Procedure Laterality Date    CHOLECYSTECTOMY      COLONOSCOPY N/A 9/15/2017    Procedure: COLONOSCOPY;  Surgeon: Brian Puckett MD;  Location: [facility] ENDOSCOPY;  Service:     D & C HYSTEROSCOPY N/A 2023    Procedure: HYSTEROSCOPY with fractional DILATATION AND CURETTAGE pap smear cystourethroscopy;  Surgeon: Magui Ribeiro MD;  Location: [facility] OR;  Service: Gynecology;  Laterality: N/A;    EYE SURGERY Left     cataract     TONSILLECTOMY        Home Meds:  No medications prior to admission.     Current Meds:   No current facility-administered medications for this encounter.    Current Outpatient Medications:     Chlorhexidine Gluconate Cloth 2 % pads, Apply  topically. AS DIRECTED, Disp: , Rfl:     citalopram (CeleXA) 20 MG tablet, Take 1 tablet by mouth Daily., Disp: 90 tablet, Rfl: 1    glucose blood test strip, OneTouch Verio In Vitro Strip; Patient Sig: CHECK BLOOD GLUCOSE 1 TIME A DAY; E11.65, Disp: 100 each, Rfl: 3    Hydrocortisone, Perianal, (ANUSOL-HC) 2.5 % rectal cream, Insert  into the  rectum 2 (Two) Times a Day. (Patient taking differently: Insert  into the rectum 2 (Two) Times a Day As Needed for Hemorrhoids.), Disp: 28 g, Rfl: 2    levothyroxine (SYNTHROID, LEVOTHROID) 100 MCG tablet, Take 1 tablet by mouth once daily (Patient taking differently: Take 1 tablet by mouth Every Morning.), Disp: 30 tablet, Rfl: 5    lisinopril-hydrochlorothiazide (PRINZIDE,ZESTORETIC) 20-12.5 MG per tablet, Take 1 tablet by mouth Daily., Disp: , Rfl:     metFORMIN (GLUCOPHAGE) 1000 MG tablet, TAKE 1 TABLET BY MOUTH TWICE DAILY WITH MEALS (Patient taking differently: Take 1 tablet by mouth 2 (Two) Times a Day With Meals.), Disp: 180 tablet, Rfl: 3    nystatin 353308 UNIT/GM powder, APPLY  POWDER TOPICALLY TO AFFECTED AREA AS DIRECTED TWICE DAILY (Patient taking differently: Apply 1 application  topically to the appropriate area as directed 2 (Two) Times a Day As Needed (TO RED AREA  AS NEEDED).), Disp: 60 g, Rfl: 2    Omega-3 Fatty Acids (FISH OIL) 1000 MG capsule delayed-release, Take 1 capsule by mouth Daily. HOLD FOR SURGERY, Disp: , Rfl:     omeprazole (priLOSEC) 20 MG capsule, Take 1 capsule by mouth Daily., Disp: , Rfl:     ONETOUCH DELICA LANCETS FINE misc, , Disp: , Rfl:     ReliOn Pen Needles 32G X 4 MM misc, USE 1 PEN NEEDLE ONCE DAILY, Disp: 100 each, Rfl: 3    rosuvastatin (CRESTOR) 20 MG tablet, Take 1 tablet by mouth Every Night., Disp: 90 tablet, Rfl: 3    Soliqua 100-33 UNT-MCG/ML solution pen-injector injection, INJECT 55 UNITS UNDER THE SKIN INTO THE APPROPRIATE AREA AS DIRECTED DAILY, Disp: 15 mL, Rfl: 5    vitamin D (ERGOCALCIFEROL) 1.25 MG (46941 UT) capsule capsule, Take 1 capsule by mouth once a week (Patient taking differently: Take 1 capsule by mouth 1 (One) Time Per Week. MONDAYS), Disp: 4 capsule, Rfl: 0  Allergies:  No Known Allergies  Immunizations:  Immunization History   Administered Date(s) Administered    COVID-19 (PFIZER) Purple Cap Monovalent 02/13/2021, 03/06/2021, 09/26/2021     Flu Vaccine Quad PF 6-35MO 2018    Fluad Quad 65+ 10/15/2022    Fluzone High Dose =>65 Years ([employer] ONLY) 10/02/2021    PPD Test 2017    Pneumococcal Polysaccharide (PPSV23) 2019    Tdap 2017     Social History:   Social History     Tobacco Use    Smoking status: Never    Smokeless tobacco: Never   Substance Use Topics    Alcohol use: No      Family History:  Family History   Problem Relation Age of Onset    Lung cancer Mother         smoker    Diabetes type II Father          at 82    Coronary artery disease Father     No Known Problems Sister     No Known Problems Sister     Cancer Brother         cholangiocarcinoma ( 54)    Diabetes type II Brother     Lung cancer Maternal Aunt         Adenocarcinoma (6 sisters)    Colon cancer Neg Hx     Malig Hyperthermia Neg Hx         Review of Systems  Constitutional:  Denies fever or chills   Eyes:  Denies change in visual acuity   HEENT:  Denies nasal congestion or sore throat   Respiratory:  Denies cough or shortness of breath   Cardiovascular:  Denies chest pain or edema   GI:  Denies abdominal pain, nausea, vomiting, bloody stools or diarrhea   :  Denies dysuria   Musculoskeletal:  Denies back pain or joint pain   Integument:  Denies rash   Neurologic:  Denies headache, focal weakness or sensory changes   Endocrine:  Denies polyuria or polydipsia   Lymphatic:  Denies swollen glands   Psychiatric:  Denies depression or anxiety       Objective:  tMax 24 hrs: No data recorded.    Vitals Ranges:        Exam:  Vital Signs: There were no vitals taken for this visit.  Constitutional:  Well developed, well nourished, no acute distress, non-toxic appearance    GI:  Soft, nondistended, normal bowel sounds, nontender, no organomegaly, no mass, no rebound, no guarding   :  No costovertebral angle tenderness   Musculoskeletal:  No edema, no tenderness, no deformities. Back- no tenderness  Integument:  Well hydrated, no rash   Lymphatic:  No  lymphadenopathy noted   Neurologic:  Alert & oriented x 3,  Pelvic:     POPQ      Assessment:    Uterovaginal prolapse, incomplete    Cystocele, midline    Cystocele, lateral    Vaginal enterocele    Rectocele    Female stress incontinence      Plan:  Laparoscopic uterosacral ligament colpopexy sacral colpopexy  Laparoscopic paravaginal repair  Laparoscopic hysterectomy with bilateral salpingectomy  Possible laparoscopic unilateral or bilateral oophorectomy  Laparoscopic abdominal enterocele repair  Pubovaginal sling  Posterior colporrhaphy  Anterior colporrhaphy  Extraperitoneal colpopexy sacrospinous ligament fixation  Insertion of vaginal grafts  Cystourethroscopy       Magui Ribeiro MD  8/9/2023

## 2023-08-14 NOTE — PLAN OF CARE
Laparoscopic uterosacral ligament colpopexy sacral colpopexy  Laparoscopic paravaginal repair  Laparoscopic hysterectomy with bilateral salpingectomy  Possible laparoscopic unilateral or bilateral oophorectomy  Laparoscopic abdominal enterocele repair  Pubovaginal sling  Posterior colporrhaphy  Anterior colporrhaphy  Extraperitoneal colpopexy sacrospinous ligament fixation  Insertion of vaginal grafts  Cystourethroscopy

## 2023-08-16 DIAGNOSIS — E11.9 TYPE 2 DIABETES MELLITUS WITHOUT COMPLICATION, WITH LONG-TERM CURRENT USE OF INSULIN: ICD-10-CM

## 2023-08-16 DIAGNOSIS — Z79.4 TYPE 2 DIABETES MELLITUS WITHOUT COMPLICATION, WITH LONG-TERM CURRENT USE OF INSULIN: ICD-10-CM

## 2023-08-16 RX ORDER — PEN NEEDLE, DIABETIC 32GX 5/32"
1 NEEDLE, DISPOSABLE MISCELLANEOUS DAILY
Qty: 100 EACH | Refills: 3 | Status: SHIPPED | OUTPATIENT
Start: 2023-08-16

## 2023-08-17 ENCOUNTER — ANESTHESIA (OUTPATIENT)
Dept: PERIOP | Facility: HOSPITAL | Age: 68
End: 2023-08-17
Payer: MEDICARE

## 2023-08-17 ENCOUNTER — ANESTHESIA EVENT (OUTPATIENT)
Dept: PERIOP | Facility: HOSPITAL | Age: 68
End: 2023-08-17
Payer: MEDICARE

## 2023-08-17 ENCOUNTER — HOSPITAL ENCOUNTER (OUTPATIENT)
Facility: HOSPITAL | Age: 68
Setting detail: OBSERVATION
Discharge: HOME | End: 2023-08-18
Attending: OBSTETRICS & GYNECOLOGY | Admitting: OBSTETRICS & GYNECOLOGY
Payer: MEDICARE

## 2023-08-17 DIAGNOSIS — E78.5 DYSLIPIDEMIA: Chronic | ICD-10-CM

## 2023-08-17 DIAGNOSIS — N81.2 UTEROVAGINAL PROLAPSE, INCOMPLETE: ICD-10-CM

## 2023-08-17 DIAGNOSIS — N81.11 CYSTOCELE, MIDLINE: ICD-10-CM

## 2023-08-17 DIAGNOSIS — Z79.4 TYPE 2 DIABETES MELLITUS WITHOUT COMPLICATION, WITH LONG-TERM CURRENT USE OF INSULIN: ICD-10-CM

## 2023-08-17 DIAGNOSIS — N81.6 RECTOCELE: ICD-10-CM

## 2023-08-17 DIAGNOSIS — E03.9 HYPOTHYROIDISM, UNSPECIFIED TYPE: ICD-10-CM

## 2023-08-17 DIAGNOSIS — E55.9 VITAMIN D DEFICIENCY: ICD-10-CM

## 2023-08-17 DIAGNOSIS — E11.9 TYPE 2 DIABETES MELLITUS WITHOUT COMPLICATION, WITH LONG-TERM CURRENT USE OF INSULIN: ICD-10-CM

## 2023-08-17 DIAGNOSIS — N81.5 VAGINAL ENTEROCELE: ICD-10-CM

## 2023-08-17 DIAGNOSIS — N39.3 FEMALE STRESS INCONTINENCE: ICD-10-CM

## 2023-08-17 DIAGNOSIS — K64.9 HEMORRHOIDS, UNSPECIFIED HEMORRHOID TYPE: ICD-10-CM

## 2023-08-17 DIAGNOSIS — N81.12 CYSTOCELE, LATERAL: ICD-10-CM

## 2023-08-17 DIAGNOSIS — I10 BENIGN ESSENTIAL HYPERTENSION: Chronic | ICD-10-CM

## 2023-08-17 LAB
GLUCOSE BLDC GLUCOMTR-MCNC: 166 MG/DL (ref 70–130)
GLUCOSE BLDC GLUCOMTR-MCNC: 293 MG/DL (ref 70–130)
GLUCOSE BLDC GLUCOMTR-MCNC: 308 MG/DL (ref 70–130)
GLUCOSE BLDC GLUCOMTR-MCNC: 334 MG/DL (ref 70–130)
GLUCOSE BLDC GLUCOMTR-MCNC: 386 MG/DL (ref 70–130)

## 2023-08-17 PROCEDURE — 82948 REAGENT STRIP/BLOOD GLUCOSE: CPT

## 2023-08-17 PROCEDURE — 88305 TISSUE EXAM BY PATHOLOGIST: CPT | Performed by: OBSTETRICS & GYNECOLOGY

## 2023-08-17 PROCEDURE — 25010000002 PROPOFOL 10 MG/ML EMULSION: Performed by: NURSE ANESTHETIST, CERTIFIED REGISTERED

## 2023-08-17 PROCEDURE — 25010000002 ONDANSETRON PER 1 MG: Performed by: NURSE ANESTHETIST, CERTIFIED REGISTERED

## 2023-08-17 PROCEDURE — 25010000002 SUGAMMADEX 200 MG/2ML SOLUTION: Performed by: NURSE ANESTHETIST, CERTIFIED REGISTERED

## 2023-08-17 PROCEDURE — 63710000001 INSULIN LISPRO (HUMAN) PER 5 UNITS: Performed by: INTERNAL MEDICINE

## 2023-08-17 PROCEDURE — 25010000002 ALBUMIN HUMAN 5% PER 50 ML: Performed by: NURSE ANESTHETIST, CERTIFIED REGISTERED

## 2023-08-17 PROCEDURE — 25010000002 CEFAZOLIN IN DEXTROSE 2000 MG/ 100 ML SOLUTION: Performed by: OBSTETRICS & GYNECOLOGY

## 2023-08-17 PROCEDURE — 25010000002 LABETALOL 5 MG/ML SOLUTION: Performed by: NURSE ANESTHETIST, CERTIFIED REGISTERED

## 2023-08-17 PROCEDURE — 25010000002 FENTANYL CITRATE (PF) 50 MCG/ML SOLUTION: Performed by: ANESTHESIOLOGY

## 2023-08-17 PROCEDURE — C1889 IMPLANT/INSERT DEVICE, NOC: HCPCS

## 2023-08-17 PROCEDURE — 25010000002 DEXAMETHASONE SODIUM PHOSPHATE 20 MG/5ML SOLUTION: Performed by: NURSE ANESTHETIST, CERTIFIED REGISTERED

## 2023-08-17 PROCEDURE — 25010000002 HYDROMORPHONE 1 MG/ML SOLUTION: Performed by: NURSE ANESTHETIST, CERTIFIED REGISTERED

## 2023-08-17 PROCEDURE — G0378 HOSPITAL OBSERVATION PER HR: HCPCS

## 2023-08-17 PROCEDURE — 63710000001 INSULIN REGULAR HUMAN PER 5 UNITS: Performed by: ANESTHESIOLOGY

## 2023-08-17 PROCEDURE — 25010000002 PHENYLEPHRINE 10 MG/ML SOLUTION 5 ML VIAL: Performed by: NURSE ANESTHETIST, CERTIFIED REGISTERED

## 2023-08-17 PROCEDURE — 25010000002 KETOROLAC TROMETHAMINE PER 15 MG: Performed by: NURSE ANESTHETIST, CERTIFIED REGISTERED

## 2023-08-17 PROCEDURE — P9041 ALBUMIN (HUMAN),5%, 50ML: HCPCS | Performed by: NURSE ANESTHETIST, CERTIFIED REGISTERED

## 2023-08-17 PROCEDURE — 25010000002 MIDAZOLAM PER 1 MG: Performed by: ANESTHESIOLOGY

## 2023-08-17 PROCEDURE — 25010000002 CEFAZOLIN IN DEXTROSE 2-4 GM/100ML-% SOLUTION: Performed by: OBSTETRICS & GYNECOLOGY

## 2023-08-17 PROCEDURE — 25010000002 MAGNESIUM SULFATE PER 500 MG OF MAGNESIUM: Performed by: NURSE ANESTHETIST, CERTIFIED REGISTERED

## 2023-08-17 DEVICE — ARISTA AH ABSORBABLE HEMOSTATIC PARTICLES
Type: IMPLANTABLE DEVICE | Site: ABDOMEN | Status: FUNCTIONAL
Brand: ARISTA™ AH

## 2023-08-17 DEVICE — GRFT TISS STRATTICE FIRM 6X10CM: Type: IMPLANTABLE DEVICE | Site: ABDOMEN | Status: FUNCTIONAL

## 2023-08-17 RX ORDER — SODIUM CHLORIDE, SODIUM LACTATE, POTASSIUM CHLORIDE, CALCIUM CHLORIDE 600; 310; 30; 20 MG/100ML; MG/100ML; MG/100ML; MG/100ML
INJECTION, SOLUTION INTRAVENOUS CONTINUOUS PRN
Status: DISCONTINUED | OUTPATIENT
Start: 2023-08-17 | End: 2023-08-17 | Stop reason: SURG

## 2023-08-17 RX ORDER — MAGNESIUM SULFATE HEPTAHYDRATE 500 MG/ML
INJECTION, SOLUTION INTRAMUSCULAR; INTRAVENOUS AS NEEDED
Status: DISCONTINUED | OUTPATIENT
Start: 2023-08-17 | End: 2023-08-17 | Stop reason: SURG

## 2023-08-17 RX ORDER — LISINOPRIL 20 MG/1
20 TABLET ORAL NIGHTLY
Status: DISCONTINUED | OUTPATIENT
Start: 2023-08-17 | End: 2023-08-18 | Stop reason: HOSPADM

## 2023-08-17 RX ORDER — ONDANSETRON 2 MG/ML
INJECTION INTRAMUSCULAR; INTRAVENOUS AS NEEDED
Status: DISCONTINUED | OUTPATIENT
Start: 2023-08-17 | End: 2023-08-17 | Stop reason: SURG

## 2023-08-17 RX ORDER — MAGNESIUM HYDROXIDE 1200 MG/15ML
LIQUID ORAL AS NEEDED
Status: DISCONTINUED | OUTPATIENT
Start: 2023-08-17 | End: 2023-08-18 | Stop reason: HOSPADM

## 2023-08-17 RX ORDER — IBUPROFEN 600 MG/1
1 TABLET ORAL
Status: DISCONTINUED | OUTPATIENT
Start: 2023-08-17 | End: 2023-08-18 | Stop reason: HOSPADM

## 2023-08-17 RX ORDER — OXYCODONE AND ACETAMINOPHEN 7.5; 325 MG/1; MG/1
1 TABLET ORAL EVERY 4 HOURS PRN
Status: DISCONTINUED | OUTPATIENT
Start: 2023-08-17 | End: 2023-08-17 | Stop reason: HOSPADM

## 2023-08-17 RX ORDER — SODIUM CHLORIDE 9 MG/ML
100 INJECTION, SOLUTION INTRAVENOUS CONTINUOUS
Status: DISCONTINUED | OUTPATIENT
Start: 2023-08-17 | End: 2023-08-18 | Stop reason: HOSPADM

## 2023-08-17 RX ORDER — PROMETHAZINE HYDROCHLORIDE 25 MG/1
25 SUPPOSITORY RECTAL ONCE AS NEEDED
Status: DISCONTINUED | OUTPATIENT
Start: 2023-08-17 | End: 2023-08-17 | Stop reason: HOSPADM

## 2023-08-17 RX ORDER — SODIUM CHLORIDE, SODIUM LACTATE, POTASSIUM CHLORIDE, CALCIUM CHLORIDE 600; 310; 30; 20 MG/100ML; MG/100ML; MG/100ML; MG/100ML
9 INJECTION, SOLUTION INTRAVENOUS CONTINUOUS
Status: DISCONTINUED | OUTPATIENT
Start: 2023-08-17 | End: 2023-08-18 | Stop reason: HOSPADM

## 2023-08-17 RX ORDER — FENTANYL CITRATE 50 UG/ML
50 INJECTION, SOLUTION INTRAMUSCULAR; INTRAVENOUS
Status: DISCONTINUED | OUTPATIENT
Start: 2023-08-17 | End: 2023-08-17 | Stop reason: HOSPADM

## 2023-08-17 RX ORDER — DOCUSATE SODIUM 100 MG/1
100 CAPSULE, LIQUID FILLED ORAL 2 TIMES DAILY
Status: DISCONTINUED | OUTPATIENT
Start: 2023-08-17 | End: 2023-08-18 | Stop reason: HOSPADM

## 2023-08-17 RX ORDER — FLUMAZENIL 0.1 MG/ML
0.2 INJECTION INTRAVENOUS AS NEEDED
Status: DISCONTINUED | OUTPATIENT
Start: 2023-08-17 | End: 2023-08-17 | Stop reason: HOSPADM

## 2023-08-17 RX ORDER — PROPOFOL 10 MG/ML
VIAL (ML) INTRAVENOUS AS NEEDED
Status: DISCONTINUED | OUTPATIENT
Start: 2023-08-17 | End: 2023-08-17 | Stop reason: SURG

## 2023-08-17 RX ORDER — ONDANSETRON 4 MG/1
4 TABLET, FILM COATED ORAL EVERY 6 HOURS PRN
Status: DISCONTINUED | OUTPATIENT
Start: 2023-08-17 | End: 2023-08-18 | Stop reason: HOSPADM

## 2023-08-17 RX ORDER — HYDROCODONE BITARTRATE AND ACETAMINOPHEN 7.5; 325 MG/1; MG/1
1 TABLET ORAL ONCE AS NEEDED
Status: DISCONTINUED | OUTPATIENT
Start: 2023-08-17 | End: 2023-08-17 | Stop reason: HOSPADM

## 2023-08-17 RX ORDER — PHENYLEPHRINE HCL IN 0.9% NACL 0.5 MG/5ML
SYRINGE (ML) INTRAVENOUS AS NEEDED
Status: DISCONTINUED | OUTPATIENT
Start: 2023-08-17 | End: 2023-08-17 | Stop reason: SURG

## 2023-08-17 RX ORDER — FAMOTIDINE 10 MG/ML
20 INJECTION, SOLUTION INTRAVENOUS ONCE
Status: COMPLETED | OUTPATIENT
Start: 2023-08-17 | End: 2023-08-17

## 2023-08-17 RX ORDER — SODIUM CHLORIDE 0.9 % (FLUSH) 0.9 %
3-10 SYRINGE (ML) INJECTION AS NEEDED
Status: DISCONTINUED | OUTPATIENT
Start: 2023-08-17 | End: 2023-08-17 | Stop reason: HOSPADM

## 2023-08-17 RX ORDER — ENOXAPARIN SODIUM 100 MG/ML
40 INJECTION SUBCUTANEOUS DAILY
Status: DISCONTINUED | OUTPATIENT
Start: 2023-08-18 | End: 2023-08-18 | Stop reason: HOSPADM

## 2023-08-17 RX ORDER — DIPHENHYDRAMINE HYDROCHLORIDE 50 MG/ML
12.5 INJECTION INTRAMUSCULAR; INTRAVENOUS
Status: DISCONTINUED | OUTPATIENT
Start: 2023-08-17 | End: 2023-08-17 | Stop reason: HOSPADM

## 2023-08-17 RX ORDER — ACETAMINOPHEN 500 MG
1000 TABLET ORAL ONCE
Status: COMPLETED | OUTPATIENT
Start: 2023-08-17 | End: 2023-08-17

## 2023-08-17 RX ORDER — EPHEDRINE SULFATE 50 MG/ML
5 INJECTION, SOLUTION INTRAVENOUS ONCE AS NEEDED
Status: DISCONTINUED | OUTPATIENT
Start: 2023-08-17 | End: 2023-08-17 | Stop reason: HOSPADM

## 2023-08-17 RX ORDER — KETAMINE HCL IN NACL, ISO-OSM 100MG/10ML
SYRINGE (ML) INJECTION AS NEEDED
Status: DISCONTINUED | OUTPATIENT
Start: 2023-08-17 | End: 2023-08-17 | Stop reason: SURG

## 2023-08-17 RX ORDER — ROCURONIUM BROMIDE 10 MG/ML
INJECTION, SOLUTION INTRAVENOUS AS NEEDED
Status: DISCONTINUED | OUTPATIENT
Start: 2023-08-17 | End: 2023-08-17 | Stop reason: SURG

## 2023-08-17 RX ORDER — HYDROMORPHONE HYDROCHLORIDE 1 MG/ML
0.5 INJECTION, SOLUTION INTRAMUSCULAR; INTRAVENOUS; SUBCUTANEOUS
Status: DISCONTINUED | OUTPATIENT
Start: 2023-08-17 | End: 2023-08-17 | Stop reason: HOSPADM

## 2023-08-17 RX ORDER — DEXAMETHASONE SODIUM PHOSPHATE 4 MG/ML
INJECTION, SOLUTION INTRA-ARTICULAR; INTRALESIONAL; INTRAMUSCULAR; INTRAVENOUS; SOFT TISSUE AS NEEDED
Status: DISCONTINUED | OUTPATIENT
Start: 2023-08-17 | End: 2023-08-17 | Stop reason: SURG

## 2023-08-17 RX ORDER — CEFAZOLIN SODIUM 2 G/100ML
2000 INJECTION, SOLUTION INTRAVENOUS ONCE
Status: COMPLETED | OUTPATIENT
Start: 2023-08-17 | End: 2023-08-17

## 2023-08-17 RX ORDER — KETOROLAC TROMETHAMINE 30 MG/ML
INJECTION, SOLUTION INTRAMUSCULAR; INTRAVENOUS AS NEEDED
Status: DISCONTINUED | OUTPATIENT
Start: 2023-08-17 | End: 2023-08-17 | Stop reason: SURG

## 2023-08-17 RX ORDER — HYDROCHLOROTHIAZIDE 12.5 MG/1
12.5 TABLET ORAL NIGHTLY
Status: DISCONTINUED | OUTPATIENT
Start: 2023-08-17 | End: 2023-08-18 | Stop reason: HOSPADM

## 2023-08-17 RX ORDER — INSULIN LISPRO 100 [IU]/ML
2-9 INJECTION, SOLUTION INTRAVENOUS; SUBCUTANEOUS
Status: DISCONTINUED | OUTPATIENT
Start: 2023-08-17 | End: 2023-08-18 | Stop reason: HOSPADM

## 2023-08-17 RX ORDER — HYDRALAZINE HYDROCHLORIDE 20 MG/ML
5 INJECTION INTRAMUSCULAR; INTRAVENOUS
Status: DISCONTINUED | OUTPATIENT
Start: 2023-08-17 | End: 2023-08-17 | Stop reason: HOSPADM

## 2023-08-17 RX ORDER — LIDOCAINE HYDROCHLORIDE 20 MG/ML
INJECTION, SOLUTION INFILTRATION; PERINEURAL AS NEEDED
Status: DISCONTINUED | OUTPATIENT
Start: 2023-08-17 | End: 2023-08-17 | Stop reason: SURG

## 2023-08-17 RX ORDER — CITALOPRAM 20 MG/1
20 TABLET ORAL DAILY
Status: DISCONTINUED | OUTPATIENT
Start: 2023-08-18 | End: 2023-08-18 | Stop reason: HOSPADM

## 2023-08-17 RX ORDER — ROSUVASTATIN CALCIUM 20 MG/1
20 TABLET, COATED ORAL NIGHTLY
Status: DISCONTINUED | OUTPATIENT
Start: 2023-08-17 | End: 2023-08-18 | Stop reason: HOSPADM

## 2023-08-17 RX ORDER — NALOXONE HCL 0.4 MG/ML
0.2 VIAL (ML) INJECTION AS NEEDED
Status: DISCONTINUED | OUTPATIENT
Start: 2023-08-17 | End: 2023-08-17 | Stop reason: HOSPADM

## 2023-08-17 RX ORDER — SODIUM CHLORIDE 0.9 % (FLUSH) 0.9 %
10 SYRINGE (ML) INJECTION EVERY 12 HOURS SCHEDULED
Status: DISCONTINUED | OUTPATIENT
Start: 2023-08-17 | End: 2023-08-17 | Stop reason: HOSPADM

## 2023-08-17 RX ORDER — PROMETHAZINE HYDROCHLORIDE 12.5 MG/1
12.5 SUPPOSITORY RECTAL EVERY 6 HOURS PRN
Status: DISCONTINUED | OUTPATIENT
Start: 2023-08-17 | End: 2023-08-18 | Stop reason: HOSPADM

## 2023-08-17 RX ORDER — ONDANSETRON 2 MG/ML
4 INJECTION INTRAMUSCULAR; INTRAVENOUS ONCE AS NEEDED
Status: DISCONTINUED | OUTPATIENT
Start: 2023-08-17 | End: 2023-08-17 | Stop reason: HOSPADM

## 2023-08-17 RX ORDER — MIDAZOLAM HYDROCHLORIDE 1 MG/ML
0.5 INJECTION INTRAMUSCULAR; INTRAVENOUS
Status: DISCONTINUED | OUTPATIENT
Start: 2023-08-17 | End: 2023-08-17 | Stop reason: HOSPADM

## 2023-08-17 RX ORDER — ONDANSETRON 2 MG/ML
4 INJECTION INTRAMUSCULAR; INTRAVENOUS EVERY 6 HOURS PRN
Status: DISCONTINUED | OUTPATIENT
Start: 2023-08-17 | End: 2023-08-18 | Stop reason: HOSPADM

## 2023-08-17 RX ORDER — SODIUM CHLORIDE 0.9 % (FLUSH) 0.9 %
3 SYRINGE (ML) INJECTION EVERY 12 HOURS SCHEDULED
Status: DISCONTINUED | OUTPATIENT
Start: 2023-08-17 | End: 2023-08-17 | Stop reason: HOSPADM

## 2023-08-17 RX ORDER — ALBUMIN, HUMAN INJ 5% 5 %
SOLUTION INTRAVENOUS CONTINUOUS PRN
Status: DISCONTINUED | OUTPATIENT
Start: 2023-08-17 | End: 2023-08-17 | Stop reason: SURG

## 2023-08-17 RX ORDER — DROPERIDOL 2.5 MG/ML
0.62 INJECTION, SOLUTION INTRAMUSCULAR; INTRAVENOUS
Status: DISCONTINUED | OUTPATIENT
Start: 2023-08-17 | End: 2023-08-17 | Stop reason: HOSPADM

## 2023-08-17 RX ORDER — IBUPROFEN 400 MG/1
400 TABLET ORAL
Status: DISCONTINUED | OUTPATIENT
Start: 2023-08-17 | End: 2023-08-18 | Stop reason: HOSPADM

## 2023-08-17 RX ORDER — MORPHINE SULFATE 2 MG/ML
1 INJECTION, SOLUTION INTRAMUSCULAR; INTRAVENOUS
Status: DISCONTINUED | OUTPATIENT
Start: 2023-08-17 | End: 2023-08-18 | Stop reason: HOSPADM

## 2023-08-17 RX ORDER — NICOTINE POLACRILEX 4 MG
15 LOZENGE BUCCAL
Status: DISCONTINUED | OUTPATIENT
Start: 2023-08-17 | End: 2023-08-18 | Stop reason: HOSPADM

## 2023-08-17 RX ORDER — PROMETHAZINE HYDROCHLORIDE 12.5 MG/1
12.5 TABLET ORAL ONCE AS NEEDED
Status: DISCONTINUED | OUTPATIENT
Start: 2023-08-17 | End: 2023-08-17 | Stop reason: HOSPADM

## 2023-08-17 RX ORDER — PHENAZOPYRIDINE HYDROCHLORIDE 200 MG/1
200 TABLET, FILM COATED ORAL ONCE
Status: COMPLETED | OUTPATIENT
Start: 2023-08-17 | End: 2023-08-17

## 2023-08-17 RX ORDER — PANTOPRAZOLE SODIUM 40 MG/1
40 TABLET, DELAYED RELEASE ORAL
Status: DISCONTINUED | OUTPATIENT
Start: 2023-08-18 | End: 2023-08-18 | Stop reason: HOSPADM

## 2023-08-17 RX ORDER — IPRATROPIUM BROMIDE AND ALBUTEROL SULFATE 2.5; .5 MG/3ML; MG/3ML
3 SOLUTION RESPIRATORY (INHALATION) ONCE AS NEEDED
Status: DISCONTINUED | OUTPATIENT
Start: 2023-08-17 | End: 2023-08-17 | Stop reason: HOSPADM

## 2023-08-17 RX ORDER — LEVOTHYROXINE SODIUM 0.1 MG/1
100 TABLET ORAL
Status: DISCONTINUED | OUTPATIENT
Start: 2023-08-18 | End: 2023-08-18 | Stop reason: HOSPADM

## 2023-08-17 RX ORDER — NALOXONE HCL 0.4 MG/ML
0.4 VIAL (ML) INJECTION
Status: DISCONTINUED | OUTPATIENT
Start: 2023-08-17 | End: 2023-08-18 | Stop reason: HOSPADM

## 2023-08-17 RX ORDER — MORPHINE SULFATE 2 MG/ML
2 INJECTION, SOLUTION INTRAMUSCULAR; INTRAVENOUS
Status: DISCONTINUED | OUTPATIENT
Start: 2023-08-17 | End: 2023-08-18 | Stop reason: HOSPADM

## 2023-08-17 RX ORDER — OXYCODONE AND ACETAMINOPHEN 10; 325 MG/1; MG/1
1 TABLET ORAL EVERY 4 HOURS PRN
Status: DISCONTINUED | OUTPATIENT
Start: 2023-08-17 | End: 2023-08-18 | Stop reason: HOSPADM

## 2023-08-17 RX ORDER — LABETALOL HYDROCHLORIDE 5 MG/ML
INJECTION, SOLUTION INTRAVENOUS AS NEEDED
Status: DISCONTINUED | OUTPATIENT
Start: 2023-08-17 | End: 2023-08-17 | Stop reason: SURG

## 2023-08-17 RX ORDER — DEXTROSE MONOHYDRATE 25 G/50ML
25 INJECTION, SOLUTION INTRAVENOUS
Status: DISCONTINUED | OUTPATIENT
Start: 2023-08-17 | End: 2023-08-18 | Stop reason: HOSPADM

## 2023-08-17 RX ORDER — PROMETHAZINE HYDROCHLORIDE 25 MG/1
25 TABLET ORAL ONCE AS NEEDED
Status: DISCONTINUED | OUTPATIENT
Start: 2023-08-17 | End: 2023-08-17 | Stop reason: HOSPADM

## 2023-08-17 RX ORDER — CEFAZOLIN SODIUM 2 G/100ML
2000 INJECTION, SOLUTION INTRAVENOUS EVERY 8 HOURS
Status: COMPLETED | OUTPATIENT
Start: 2023-08-17 | End: 2023-08-18

## 2023-08-17 RX ORDER — CLOBETASOL PROPIONATE 0.5 MG/G
OINTMENT TOPICAL AS NEEDED
Status: DISCONTINUED | OUTPATIENT
Start: 2023-08-17 | End: 2023-08-18 | Stop reason: HOSPADM

## 2023-08-17 RX ORDER — LABETALOL HYDROCHLORIDE 5 MG/ML
5 INJECTION, SOLUTION INTRAVENOUS
Status: DISCONTINUED | OUTPATIENT
Start: 2023-08-17 | End: 2023-08-17 | Stop reason: HOSPADM

## 2023-08-17 RX ORDER — OXYCODONE HYDROCHLORIDE AND ACETAMINOPHEN 5; 325 MG/1; MG/1
1 TABLET ORAL EVERY 4 HOURS PRN
Status: DISCONTINUED | OUTPATIENT
Start: 2023-08-17 | End: 2023-08-18 | Stop reason: HOSPADM

## 2023-08-17 RX ORDER — SODIUM CHLORIDE 0.9 % (FLUSH) 0.9 %
10 SYRINGE (ML) INJECTION AS NEEDED
Status: DISCONTINUED | OUTPATIENT
Start: 2023-08-17 | End: 2023-08-17 | Stop reason: HOSPADM

## 2023-08-17 RX ORDER — SODIUM CHLORIDE 9 MG/ML
40 INJECTION, SOLUTION INTRAVENOUS AS NEEDED
Status: DISCONTINUED | OUTPATIENT
Start: 2023-08-17 | End: 2023-08-17 | Stop reason: HOSPADM

## 2023-08-17 RX ORDER — BUPIVACAINE HYDROCHLORIDE AND EPINEPHRINE 2.5; 5 MG/ML; UG/ML
INJECTION, SOLUTION EPIDURAL; INFILTRATION; INTRACAUDAL; PERINEURAL AS NEEDED
Status: DISCONTINUED | OUTPATIENT
Start: 2023-08-17 | End: 2023-08-18 | Stop reason: HOSPADM

## 2023-08-17 RX ORDER — PROMETHAZINE HYDROCHLORIDE 12.5 MG/1
12.5 TABLET ORAL EVERY 6 HOURS PRN
Status: DISCONTINUED | OUTPATIENT
Start: 2023-08-17 | End: 2023-08-18 | Stop reason: HOSPADM

## 2023-08-17 RX ADMIN — CEFAZOLIN SODIUM 2000 MG: 2 INJECTION, SOLUTION INTRAVENOUS at 15:21

## 2023-08-17 RX ADMIN — Medication 100 MCG: at 08:22

## 2023-08-17 RX ADMIN — LABETALOL HYDROCHLORIDE 5 MG: 5 INJECTION, SOLUTION INTRAVENOUS at 10:03

## 2023-08-17 RX ADMIN — Medication 100 MCG: at 08:34

## 2023-08-17 RX ADMIN — HYDROMORPHONE HYDROCHLORIDE 0.5 MG: 1 INJECTION, SOLUTION INTRAMUSCULAR; INTRAVENOUS; SUBCUTANEOUS at 16:46

## 2023-08-17 RX ADMIN — ROCURONIUM BROMIDE 20 MG: 10 INJECTION, SOLUTION INTRAVENOUS at 12:08

## 2023-08-17 RX ADMIN — Medication 100 MCG: at 08:46

## 2023-08-17 RX ADMIN — INSULIN LISPRO 6 UNITS: 100 INJECTION, SOLUTION INTRAVENOUS; SUBCUTANEOUS at 21:44

## 2023-08-17 RX ADMIN — PHENAZOPYRIDINE 200 MG: 200 TABLET ORAL at 07:14

## 2023-08-17 RX ADMIN — ONDANSETRON 4 MG: 2 INJECTION INTRAMUSCULAR; INTRAVENOUS at 07:56

## 2023-08-17 RX ADMIN — MIDAZOLAM 0.5 MG: 1 INJECTION INTRAMUSCULAR; INTRAVENOUS at 07:20

## 2023-08-17 RX ADMIN — DOCUSATE SODIUM 100 MG: 100 CAPSULE, LIQUID FILLED ORAL at 20:06

## 2023-08-17 RX ADMIN — SUGAMMADEX 200 MG: 100 INJECTION, SOLUTION INTRAVENOUS at 16:44

## 2023-08-17 RX ADMIN — SODIUM CHLORIDE, POTASSIUM CHLORIDE, SODIUM LACTATE AND CALCIUM CHLORIDE: 600; 310; 30; 20 INJECTION, SOLUTION INTRAVENOUS at 07:57

## 2023-08-17 RX ADMIN — ROCURONIUM BROMIDE 50 MG: 10 INJECTION, SOLUTION INTRAVENOUS at 07:48

## 2023-08-17 RX ADMIN — ROSUVASTATIN CALCIUM 20 MG: 20 TABLET, FILM COATED ORAL at 21:36

## 2023-08-17 RX ADMIN — SODIUM CHLORIDE, POTASSIUM CHLORIDE, SODIUM LACTATE AND CALCIUM CHLORIDE: 600; 310; 30; 20 INJECTION, SOLUTION INTRAVENOUS at 16:48

## 2023-08-17 RX ADMIN — FENTANYL CITRATE 50 MCG: 50 INJECTION, SOLUTION INTRAMUSCULAR; INTRAVENOUS at 12:49

## 2023-08-17 RX ADMIN — Medication 100 MCG: at 15:57

## 2023-08-17 RX ADMIN — CEFAZOLIN SODIUM 2000 MG: 2 INJECTION, SOLUTION INTRAVENOUS at 07:21

## 2023-08-17 RX ADMIN — ACETAMINOPHEN 1000 MG: 500 TABLET ORAL at 07:14

## 2023-08-17 RX ADMIN — Medication 10 MG: at 08:59

## 2023-08-17 RX ADMIN — Medication 100 MCG: at 16:13

## 2023-08-17 RX ADMIN — DEXAMETHASONE SODIUM PHOSPHATE 10 MG: 4 INJECTION, SOLUTION INTRAMUSCULAR; INTRAVENOUS at 07:56

## 2023-08-17 RX ADMIN — FENTANYL CITRATE 25 MCG: 50 INJECTION, SOLUTION INTRAMUSCULAR; INTRAVENOUS at 09:32

## 2023-08-17 RX ADMIN — CEFAZOLIN SODIUM 2000 MG: 2 INJECTION, SOLUTION INTRAVENOUS at 11:21

## 2023-08-17 RX ADMIN — ALBUMIN (HUMAN): 12.5 INJECTION, SOLUTION INTRAVENOUS at 16:17

## 2023-08-17 RX ADMIN — PHENYLEPHRINE HYDROCHLORIDE 0.5 MCG/KG/MIN: 10 INJECTION, SOLUTION INTRAVENOUS at 08:04

## 2023-08-17 RX ADMIN — HYDROMORPHONE HYDROCHLORIDE 0.5 MG: 1 INJECTION, SOLUTION INTRAMUSCULAR; INTRAVENOUS; SUBCUTANEOUS at 11:14

## 2023-08-17 RX ADMIN — SODIUM CHLORIDE, POTASSIUM CHLORIDE, SODIUM LACTATE AND CALCIUM CHLORIDE: 600; 310; 30; 20 INJECTION, SOLUTION INTRAVENOUS at 12:55

## 2023-08-17 RX ADMIN — Medication 20 MG: at 07:47

## 2023-08-17 RX ADMIN — SODIUM CHLORIDE, POTASSIUM CHLORIDE, SODIUM LACTATE AND CALCIUM CHLORIDE 9 ML/HR: 600; 310; 30; 20 INJECTION, SOLUTION INTRAVENOUS at 07:20

## 2023-08-17 RX ADMIN — ROCURONIUM BROMIDE 20 MG: 10 INJECTION, SOLUTION INTRAVENOUS at 10:53

## 2023-08-17 RX ADMIN — PROPOFOL 100 MG: 10 INJECTION, EMULSION INTRAVENOUS at 07:48

## 2023-08-17 RX ADMIN — LABETALOL HYDROCHLORIDE 10 MG: 5 INJECTION, SOLUTION INTRAVENOUS at 14:17

## 2023-08-17 RX ADMIN — SODIUM CHLORIDE 100 ML/HR: 9 INJECTION, SOLUTION INTRAVENOUS at 22:22

## 2023-08-17 RX ADMIN — HYDROMORPHONE HYDROCHLORIDE 0.5 MG: 1 INJECTION, SOLUTION INTRAMUSCULAR; INTRAVENOUS; SUBCUTANEOUS at 09:48

## 2023-08-17 RX ADMIN — CEFAZOLIN SODIUM 2000 MG: 2 INJECTION, SOLUTION INTRAVENOUS at 22:21

## 2023-08-17 RX ADMIN — Medication 10 MG: at 10:53

## 2023-08-17 RX ADMIN — Medication 100 MCG: at 14:57

## 2023-08-17 RX ADMIN — FAMOTIDINE 20 MG: 10 INJECTION INTRAVENOUS at 07:14

## 2023-08-17 RX ADMIN — KETOROLAC TROMETHAMINE 30 MG: 30 INJECTION, SOLUTION INTRAMUSCULAR; INTRAVENOUS at 16:34

## 2023-08-17 RX ADMIN — OXYCODONE HYDROCHLORIDE AND ACETAMINOPHEN 1 TABLET: 5; 325 TABLET ORAL at 21:36

## 2023-08-17 RX ADMIN — ROCURONIUM BROMIDE 50 MG: 10 INJECTION, SOLUTION INTRAVENOUS at 09:31

## 2023-08-17 RX ADMIN — INSULIN HUMAN 5 UNITS: 100 INJECTION, SOLUTION PARENTERAL at 17:32

## 2023-08-17 RX ADMIN — Medication 100 MCG: at 09:59

## 2023-08-17 RX ADMIN — Medication 10 MG: at 09:55

## 2023-08-17 RX ADMIN — METFORMIN HYDROCHLORIDE 1000 MG: 1000 TABLET, FILM COATED ORAL at 21:36

## 2023-08-17 RX ADMIN — ONDANSETRON 4 MG: 2 INJECTION INTRAMUSCULAR; INTRAVENOUS at 16:34

## 2023-08-17 RX ADMIN — IBUPROFEN 400 MG: 400 TABLET, FILM COATED ORAL at 20:06

## 2023-08-17 RX ADMIN — SODIUM CHLORIDE, POTASSIUM CHLORIDE, SODIUM LACTATE AND CALCIUM CHLORIDE: 600; 310; 30; 20 INJECTION, SOLUTION INTRAVENOUS at 10:41

## 2023-08-17 RX ADMIN — LIDOCAINE HYDROCHLORIDE 100 MG: 20 INJECTION, SOLUTION INFILTRATION; PERINEURAL at 07:46

## 2023-08-17 RX ADMIN — Medication 100 MCG: at 17:02

## 2023-08-17 RX ADMIN — PROPOFOL 50 MG: 10 INJECTION, EMULSION INTRAVENOUS at 09:31

## 2023-08-17 RX ADMIN — PROPOFOL 25 MCG/KG/MIN: 10 INJECTION, EMULSION INTRAVENOUS at 08:04

## 2023-08-17 RX ADMIN — LABETALOL HYDROCHLORIDE 5 MG: 5 INJECTION, SOLUTION INTRAVENOUS at 11:37

## 2023-08-17 RX ADMIN — MAGNESIUM SULFATE HEPTAHYDRATE 2 G: 500 INJECTION, SOLUTION INTRAMUSCULAR; INTRAVENOUS at 08:19

## 2023-08-17 RX ADMIN — FENTANYL CITRATE 25 MCG: 50 INJECTION, SOLUTION INTRAMUSCULAR; INTRAVENOUS at 09:39

## 2023-08-17 RX ADMIN — ROCURONIUM BROMIDE 10 MG: 10 INJECTION, SOLUTION INTRAVENOUS at 13:41

## 2023-08-17 RX ADMIN — PROPOFOL 150 MG: 10 INJECTION, EMULSION INTRAVENOUS at 07:46

## 2023-08-17 NOTE — ANESTHESIA PROCEDURE NOTES
Airway  Urgency: elective    Date/Time: 8/17/2023 7:54 AM  Difficult airway    General Information and Staff    Patient location during procedure: OR  Anesthesiologist: Juan Van MD  CRNA/CAA: Carlos Keller CRNA    Indications and Patient Condition  Indications for airway management: airway protection    Preoxygenated: yes  MILS maintained throughout  Mask difficulty assessment: 1 - vent by mask    Final Airway Details  Final airway type: endotracheal airway      Successful airway: ETT  Cuffed: yes   Successful intubation technique: direct laryngoscopy  Facilitating devices/methods: intubating stylet, Bougie and anterior pressure/BURP  Endotracheal tube insertion site: oral  Blade: Shama  Blade size: 3  ETT size (mm): 7.0  Cormack-Lehane Classification: grade III - view of epiglottis only  Placement verified by: chest auscultation and capnometry   Measured from: gums  ETT/EBT to gums (cm): 21  Number of attempts at approach: 3 or more  Assessment: lips, teeth, and gum same as pre-op and atraumatic intubation

## 2023-08-17 NOTE — ANESTHESIA PREPROCEDURE EVALUATION
Anesthesia Evaluation     history of anesthetic complications:  PONV  NPO Solid Status: > 8 hours  NPO Liquid Status: > 2 hours           Airway   Mallampati: II  Neck ROM: full  no difficulty expected  Dental - normal exam     Pulmonary - negative pulmonary ROS and normal exam   (-) COPD, asthma, sleep apnea, not a smoker    PE comment: nonlabored  Cardiovascular - normal exam  Exercise tolerance: good (4-7 METS)    Rhythm: regular  Rate: normal    (+) hypertensionhyperlipidemia  (-) valvular problems/murmurs, past MI, CAD, dysrhythmias, angina      Neuro/Psych  (+) psychiatric history Depression  (-) seizures, TIA, CVA  GI/Hepatic/Renal/Endo    (+) diabetes mellitus type 2 poorly controlled using insulin, thyroid problem hypothyroidism  (-) GERD, liver disease, no renal disease    Musculoskeletal (-) negative ROS    Abdominal    Substance History      OB/GYN          Other                      Anesthesia Plan    ASA 3     general     intravenous induction     Anesthetic plan, risks, benefits, and alternatives have been provided, discussed and informed consent has been obtained with: patient.    CODE STATUS:

## 2023-08-17 NOTE — ANESTHESIA POSTPROCEDURE EVALUATION
"Patient: Magui Dumont    Procedure Summary       Date: 08/17/23 Room / Location: [facility] OR [address] [place] [address] MAIN OR    Anesthesia Start: 0727 Anesthesia Stop: 1713    Procedure: Laparoscopic uterosacral ligament colpopexy sacral colpopexy Laparoscopic paravaginal repair Laparoscopic hysterectomy with bilateral salpingectomy Laparoscopic abdominal enterocele repair Pubovaginal sling Posterior colporrhaphy  Insertion of vaginal grafts Cystourethroscopy (Abdomen) Diagnosis:       Uterovaginal prolapse, incomplete      Cystocele, midline      Cystocele, lateral      Vaginal enterocele      Rectocele      Female stress incontinence      (Uterovaginal prolapse, incomplete [N81.2])      (Cystocele, midline [N81.11])      (Cystocele, lateral [N81.12])      (Vaginal enterocele [N81.5])      (Rectocele [N81.6])      (Female stress incontinence [N39.3])    Surgeons: Magui Ribeiro MD Provider: Juan Van MD    Anesthesia Type: general ASA Status: 3            Anesthesia Type: general    Vitals  Vitals Value Taken Time   /65 08/17/23 1815   Temp 36.8 øC (98.2 øF) 08/17/23 1707   Pulse 88 08/17/23 1827   Resp 15 08/17/23 1707   SpO2 94 % 08/17/23 1827   Vitals shown include unvalidated device data.        Post Anesthesia Care and Evaluation    Level of consciousness: awake and alert  Pain management: adequate    Airway patency: patent  Anesthetic complications: No anesthetic complications  PONV Status: none  Cardiovascular status: acceptable  Respiratory status: acceptable  Hydration status: acceptable    Comments: /67 (Patient Position: Lying)   Pulse 91   Temp 36 øC (96.8 øF) (Oral)   Resp 16   Ht 170.2 cm (67\")   Wt 72.2 kg (159 lb 2.8 oz)   SpO2 95%   BMI 24.93 kg/mý         "

## 2023-08-17 NOTE — H&P
Female Pelvic Medicine and Reconstructive Surgery   History & Physical    Patient Identification:  Name: Magui Duomnt Age: 67 y.o. Sex: female :  1955 MRN: Female Pelvic Medicine and Reconstructive Surgery   History & Physical    Patient Identification:  Name: Magui Dumont Age: 67 y.o. Sex: female :  1955 MRN: [id number]                            Problem List:    Uterovaginal prolapse, incomplete    Cystocele, midline    Cystocele, lateral    Vaginal enterocele    Rectocele    Female stress incontinence    Past Medical History:  Past Medical History:   Diagnosis Date    Hyperlipidemia     Hypertension     Hypothyroidism     Incontinence     PONV (postoperative nausea and vomiting)     Seasonal allergic rhinitis     Type 2 diabetes mellitus     Uterovaginal prolapse     CYTOCELE, VAGINAL ENTEROCELE AND RECTOCELE    Vitamin D deficiency      Past Surgical History:  Past Surgical History:   Procedure Laterality Date    CHOLECYSTECTOMY      COLONOSCOPY N/A 9/15/2017    Procedure: COLONOSCOPY;  Surgeon: Brian Puckett MD;  Location: [facility] ENDOSCOPY;  Service:     D & C HYSTEROSCOPY N/A 2023    Procedure: HYSTEROSCOPY with fractional DILATATION AND CURETTAGE pap smear cystourethroscopy;  Surgeon: aMgui Ribeiro MD;  Location: [facility] MAIN OR;  Service: Gynecology;  Laterality: N/A;    EYE SURGERY Left     cataract     TONSILLECTOMY        Home Meds:  Medications Prior to Admission   Medication Sig Dispense Refill Last Dose    Chlorhexidine Gluconate Cloth 2 % pads Apply  topically. AS DIRECTED   2023 at 0500    citalopram (CeleXA) 20 MG tablet Take 1 tablet by mouth Daily. 90 tablet 1 2023 at 0500    Hydrocortisone, Perianal, (ANUSOL-HC) 2.5 % rectal cream Insert  into the rectum 2 (Two) Times a Day. (Patient taking differently: Insert  into the rectum 2 (Two) Times a Day As Needed for Hemorrhoids.) 28 g 2 2023 at 0500    levothyroxine (SYNTHROID, LEVOTHROID) 100 MCG tablet  Take 1 tablet by mouth once daily (Patient taking differently: Take 1 tablet by mouth Every Morning.) 30 tablet 5 8/14/2023    lisinopril-hydrochlorothiazide (PRINZIDE,ZESTORETIC) 20-12.5 MG per tablet Take 1 tablet by mouth Daily.   8/16/2023 at 2200    metFORMIN (GLUCOPHAGE) 1000 MG tablet TAKE 1 TABLET BY MOUTH TWICE DAILY WITH MEALS (Patient taking differently: Take 1 tablet by mouth 2 (Two) Times a Day With Meals.) 180 tablet 3 8/16/2023 at 2200    omeprazole (priLOSEC) 20 MG capsule Take 1 capsule by mouth Daily.   8/17/2023 at 0500    rosuvastatin (CRESTOR) 20 MG tablet Take 1 tablet by mouth Every Night. 90 tablet 3 8/16/2023 at 2200    Soliqua 100-33 UNT-MCG/ML solution pen-injector injection INJECT 55 UNITS UNDER THE SKIN INTO THE APPROPRIATE AREA AS DIRECTED DAILY 15 mL 5 8/17/2023 at 0500    glucose blood test strip OneTouch Verio In Vitro Strip; Patient Sig: CHECK BLOOD GLUCOSE 1 TIME A DAY; E11.65 100 each 3     Insulin Pen Needle (ReliOn Pen Needles) 32G X 4 MM misc 1 each by Other route Daily. 100 each 3     nystatin 869437 UNIT/GM powder APPLY  POWDER TOPICALLY TO AFFECTED AREA AS DIRECTED TWICE DAILY (Patient taking differently: Apply 1 application  topically to the appropriate area as directed 2 (Two) Times a Day As Needed (TO RED AREA  AS NEEDED).) 60 g 2 7/27/2023    Omega-3 Fatty Acids (FISH OIL) 1000 MG capsule delayed-release Take 1 capsule by mouth Daily. HOLD FOR SURGERY   8/15/2023    ONETOUCH DELICA LANCETS FINE misc        vitamin D (ERGOCALCIFEROL) 1.25 MG (11744 UT) capsule capsule Take 1 capsule by mouth once a week (Patient taking differently: Take 1 capsule by mouth 1 (One) Time Per Week. MONDAYS) 4 capsule 0 8/7/2023     Current Meds:     Current Facility-Administered Medications:     acetaminophen (TYLENOL) tablet 1,000 mg, 1,000 mg, Oral, Once, Juan Van MD    ceFAZolin in dextrose (ANCEF) IVPB solution 2,000 mg, 2,000 mg, Intravenous, Once, Magui Ribeiro MD     famotidine (PEPCID) injection 20 mg, 20 mg, Intravenous, Once, Juan Van MD    fentaNYL citrate (PF) (SUBLIMAZE) injection 50 mcg, 50 mcg, Intravenous, Q10 Min PRN, Juan Van MD    lactated ringers infusion, 9 mL/hr, Intravenous, Continuous, Juan Van MD    midazolam (VERSED) injection 0.5 mg, 0.5 mg, Intravenous, Q10 Min PRN, Juan Van MD    phenazopyridine (PYRIDIUM) tablet 200 mg, 200 mg, Oral, Once, Magui Ribeiro MD    promethazine (PHENERGAN) tablet 12.5 mg, 12.5 mg, Oral, Once PRN, Juan Van MD    sodium chloride 0.9 % flush 10 mL, 10 mL, Intravenous, Q12H, Magui Ribeiro MD    sodium chloride 0.9 % flush 10 mL, 10 mL, Intravenous, PRN, Magui Ribeiro MD    sodium chloride 0.9 % flush 3 mL, 3 mL, Intravenous, Q12H, Juan Van MD    sodium chloride 0.9 % flush 3-10 mL, 3-10 mL, Intravenous, PRN, Juan Van MD    sodium chloride 0.9 % infusion 40 mL, 40 mL, Intravenous, PRN, Magui Ribeiro MD  Allergies:  No Known Allergies  Immunizations:  Immunization History   Administered Date(s) Administered    COVID-19 (PFIZER) Purple Cap Monovalent 2021, 2021, 2021    Flu Vaccine Quad PF 6-35MO 2018    Fluad Quad 65+ 10/15/2022    Fluzone High Dose =>65 Years ([employer] ONLY) 10/02/2021    PPD Test 2017    Pneumococcal Polysaccharide (PPSV23) 2019    Tdap 2017     Social History:   Social History     Tobacco Use    Smoking status: Never    Smokeless tobacco: Never   Substance Use Topics    Alcohol use: No      Family History:  Family History   Problem Relation Age of Onset    Lung cancer Mother         smoker    Diabetes type II Father          at 82    Coronary artery disease Father     No Known Problems Sister     No Known Problems Sister     Cancer Brother         cholangiocarcinoma ( 54)    Diabetes type II Brother     Lung cancer Maternal Aunt         Adenocarcinoma  "(6 sisters)    Colon cancer Neg Hx     Malig Hyperthermia Neg Hx         Review of Systems  Constitutional:  Denies fever or chills   Eyes:  Denies change in visual acuity   HEENT:  Denies nasal congestion or sore throat   Respiratory:  Denies cough or shortness of breath   Cardiovascular:  Denies chest pain or edema   GI:  Denies abdominal pain, nausea, vomiting, bloody stools or diarrhea   :  Denies dysuria   Musculoskeletal:  Denies back pain or joint pain   Integument:  Denies rash   Neurologic:  Denies headache, focal weakness or sensory changes   Endocrine:  Denies polyuria or polydipsia   Lymphatic:  Denies swollen glands   Psychiatric:  Denies depression or anxiety       Objective:  tMax 24 hrs: Temp (24hrs), Av.3 øF (36.8 øC), Min:98.3 øF (36.8 øC), Max:98.3 øF (36.8 øC)    Vitals Ranges:   Temp:  [98.3 øF (36.8 øC)] 98.3 øF (36.8 øC)  Heart Rate:  [98] 98  Resp:  [18] 18  BP: (127)/(79) 127/79    Exam:  Vital Signs: /79 (BP Location: Right arm, Patient Position: Lying)   Pulse 98   Temp 98.3 øF (36.8 øC) (Oral)   Resp 18   Ht 170.2 cm (67\")   Wt 72.2 kg (159 lb 2.8 oz)   SpO2 96%   BMI 24.93 kg/mý   Constitutional:  Well developed, well nourished, no acute distress, non-toxic appearance    GI:  Soft, nondistended, normal bowel sounds, nontender, no organomegaly, no mass, no rebound, no guarding   :  No costovertebral angle tenderness   Musculoskeletal:  No edema, no tenderness, no deformities. Back- no tenderness  Integument:  Well hydrated, no rash   Lymphatic:  No lymphadenopathy noted   Neurologic:  Alert & oriented x 3,  Pelvic:     POPQ  Assessment:    Uterovaginal prolapse, incomplete    Cystocele, midline    Cystocele, lateral    Vaginal enterocele    Rectocele    Female stress incontinence        Plan:  Laparoscopic uterosacral ligament colpopexy sacral colpopexy  Laparoscopic paravaginal repair  Laparoscopic hysterectomy with bilateral salpingectomy  Possible laparoscopic " unilateral or bilateral oophorectomy  Laparoscopic abdominal enterocele repair  Pubovaginal sling  Posterior colporrhaphy  Anterior colporrhaphy  Extraperitoneal colpopexy sacrospinous ligament fixation  Insertion of vaginal grafts  Cystourethroscopy      Magui Ribeiro MD  8/17/2023

## 2023-08-17 NOTE — ANESTHESIA PROCEDURE NOTES
Peripheral IV    Start time: 8/17/2023 7:51 AM  End time: 8/17/2023 7:52 AM  Line placed for Fluids/Medication Admin.  Performed By   Anesthesiologist: Juan Van MD  Preanesthetic Checklist  Completed: patient identified, IV checked, site marked, risks and benefits discussed, surgical consent, monitors and equipment checked, pre-op evaluation and timeout performed  Peripheral IV Prep    Prep: ChloraPrep  Patient monitoring: heart rate, cardiac monitor and continuous pulse ox  Peripheral IV Procedure   Laterality:left  Location:  Hand  Catheter size: 18 G          Post Assessment   Dressing Type: transparent and tape.    IV Dressing/Site: clean, dry and intact

## 2023-08-18 ENCOUNTER — READMISSION MANAGEMENT (OUTPATIENT)
Dept: CALL CENTER | Facility: HOSPITAL | Age: 68
End: 2023-08-18
Payer: MEDICARE

## 2023-08-18 VITALS
BODY MASS INDEX: 24.98 KG/M2 | HEART RATE: 108 BPM | RESPIRATION RATE: 16 BRPM | SYSTOLIC BLOOD PRESSURE: 123 MMHG | OXYGEN SATURATION: 94 % | HEIGHT: 67 IN | TEMPERATURE: 98 F | WEIGHT: 159.17 LBS | DIASTOLIC BLOOD PRESSURE: 66 MMHG

## 2023-08-18 LAB
GLUCOSE BLDC GLUCOMTR-MCNC: 167 MG/DL (ref 70–130)
GLUCOSE BLDC GLUCOMTR-MCNC: 228 MG/DL (ref 70–130)
GLUCOSE BLDC GLUCOMTR-MCNC: 245 MG/DL (ref 70–130)
HBA1C MFR BLD: 9.4 % (ref 4.8–5.6)
HCT VFR BLD AUTO: 29.8 % (ref 34–46.6)
HGB BLD-MCNC: 10.2 G/DL (ref 12–15.9)

## 2023-08-18 PROCEDURE — 25010000002 CEFAZOLIN IN DEXTROSE 2-4 GM/100ML-% SOLUTION: Performed by: OBSTETRICS & GYNECOLOGY

## 2023-08-18 PROCEDURE — G0378 HOSPITAL OBSERVATION PER HR: HCPCS

## 2023-08-18 PROCEDURE — 25010000002 ENOXAPARIN PER 10 MG: Performed by: OBSTETRICS & GYNECOLOGY

## 2023-08-18 PROCEDURE — 83036 HEMOGLOBIN GLYCOSYLATED A1C: CPT | Performed by: INTERNAL MEDICINE

## 2023-08-18 PROCEDURE — 85018 HEMOGLOBIN: CPT | Performed by: OBSTETRICS & GYNECOLOGY

## 2023-08-18 PROCEDURE — 85014 HEMATOCRIT: CPT | Performed by: OBSTETRICS & GYNECOLOGY

## 2023-08-18 PROCEDURE — 63710000001 INSULIN LISPRO (HUMAN) PER 5 UNITS: Performed by: INTERNAL MEDICINE

## 2023-08-18 PROCEDURE — 82948 REAGENT STRIP/BLOOD GLUCOSE: CPT

## 2023-08-18 RX ORDER — LEVOTHYROXINE SODIUM 0.1 MG/1
TABLET ORAL
Qty: 90 TABLET | Refills: 1 | Status: SHIPPED | OUTPATIENT
Start: 2023-08-18

## 2023-08-18 RX ORDER — ERGOCALCIFEROL 1.25 MG/1
50000 CAPSULE ORAL WEEKLY
Qty: 4 CAPSULE | Refills: 3 | Status: SHIPPED | OUTPATIENT
Start: 2023-08-18

## 2023-08-18 RX ADMIN — PANTOPRAZOLE SODIUM 40 MG: 40 TABLET, DELAYED RELEASE ORAL at 06:03

## 2023-08-18 RX ADMIN — METFORMIN HYDROCHLORIDE 1000 MG: 1000 TABLET, FILM COATED ORAL at 09:04

## 2023-08-18 RX ADMIN — DOCUSATE SODIUM 100 MG: 100 CAPSULE, LIQUID FILLED ORAL at 09:04

## 2023-08-18 RX ADMIN — OXYCODONE HYDROCHLORIDE AND ACETAMINOPHEN 1 TABLET: 5; 325 TABLET ORAL at 09:04

## 2023-08-18 RX ADMIN — CITALOPRAM 20 MG: 20 TABLET, FILM COATED ORAL at 09:04

## 2023-08-18 RX ADMIN — INSULIN LISPRO 2 UNITS: 100 INJECTION, SOLUTION INTRAVENOUS; SUBCUTANEOUS at 12:50

## 2023-08-18 RX ADMIN — IBUPROFEN 400 MG: 400 TABLET, FILM COATED ORAL at 12:50

## 2023-08-18 RX ADMIN — INSULIN LISPRO 4 UNITS: 100 INJECTION, SOLUTION INTRAVENOUS; SUBCUTANEOUS at 06:56

## 2023-08-18 RX ADMIN — IBUPROFEN 400 MG: 400 TABLET, FILM COATED ORAL at 00:08

## 2023-08-18 RX ADMIN — OXYCODONE HYDROCHLORIDE AND ACETAMINOPHEN 1 TABLET: 10; 325 TABLET ORAL at 04:07

## 2023-08-18 RX ADMIN — CEFAZOLIN SODIUM 2000 MG: 2 INJECTION, SOLUTION INTRAVENOUS at 06:05

## 2023-08-18 RX ADMIN — ENOXAPARIN SODIUM 40 MG: 100 INJECTION SUBCUTANEOUS at 09:04

## 2023-08-18 RX ADMIN — LEVOTHYROXINE SODIUM 100 MCG: 0.1 TABLET ORAL at 06:03

## 2023-08-18 RX ADMIN — OXYCODONE HYDROCHLORIDE AND ACETAMINOPHEN 1 TABLET: 5; 325 TABLET ORAL at 13:41

## 2023-08-18 NOTE — OUTREACH NOTE
Prep Survey      Flowsheet Row Responses   [facility] patient discharged from? [place]   Is LACE score < 7 ? Yes   Eligibility [facility]   Date of Admission 08/17/23   Date of Discharge 08/18/23   Discharge Disposition Home or Self Care   Discharge diagnosis Uterovaginal prolapse,   Laparoscopic paravaginal repair   Does the patient have one of the following disease processes/diagnoses(primary or secondary)? General Surgery   Does the patient have Home health ordered? No   Is there a DME ordered? No   Prep survey completed? Yes            JEANMARIE TOM - Registered Nurse

## 2023-08-18 NOTE — PLAN OF CARE
Problem: Adult Inpatient Plan of Care  Goal: Plan of Care Review  Outcome: Ongoing, Progressing  Flowsheets (Taken 8/18/2023 0040)  Plan of Care Reviewed With:   patient   family  Outcome Evaluation: awake and alert family at bedside, pain 4/10, post op plan of care discussed with Pt and family, IVf continued on Regular Consistent Carb diet, jello and crackers offered, on scheduled Motrin every 4 hours, Percocet and Morphine PRN, lap sites x5 with border dressings and 2 suprapubic sites with small drainage, abdomen soft, tender, small amount of vaginal bleeding noted, vaginal pack intact, kidd with adequate clear orange urine output, ambulated 400 ft with assist x2, O2 to keep sats above 92%, vital signs checked Q4 hours, sleeping between care, doing well so far   Goal Outcome Evaluation:  Plan of Care Reviewed With: patient, family           Outcome Evaluation: awake and alert family at bedside, pain 4/10, post op plan of care discussed with Pt and family, IVf continued on Regular Consistent Carb diet, jello and crackers offered, on scheduled Motrin every 4 hours, Percocet and Morphine PRN, lap sites x5 with border dressings and 2 suprapubic sites with small drainage, abdomen soft, tender, small amount of vaginal bleeding noted, vaginal pack intact, kidd with adequate clear orange urine output, ambulated 400 ft with assist x2, O2 to keep sats above 92%, vital signs checked Q4 hours, sleeping between care, doing well so far

## 2023-08-18 NOTE — NURSING NOTE
Pt and  present during discharge teaching. Pt instructed to  her medications from here preferred pharmacy. Pt has a scheduled follow up with Dr. Ribeiro. All questions answered. All belongings with pt. Pt ready to go home.

## 2023-08-18 NOTE — DISCHARGE SUMMARY
Physician Discharge Summary  Patient Identification:  Name: Magui Dumont  Age: 67 y.o.  Sex: female  :  1955  MRN: [id number]    Admit date: 2023    Discharge date and time: 2023    Admitting Physician: Magui Ribeiro MD    Discharge Physician: Magui Ribeiro MD    Admission Diagnoses: Uterovaginal prolapse, incomplete [N81.2]  Cystocele, midline [N81.11]  Cystocele, lateral [N81.12]  Vaginal enterocele [N81.5]  Rectocele [N81.6]  Female stress incontinence [N39.3]    Hospital Problems:   Principal Problem:    Uterovaginal prolapse, incomplete  Active Problems:    Cystocele, midline    Cystocele, lateral    Vaginal enterocele    Rectocele    Female stress incontinence    Discharge Diagnoses: same    Discharged Condition: good    Hospital Course:   Surgery and post operative care    Consults:   [facility] medical management post operative    Discharge Exam:  Abdomen soft   Bandages intact suprapubic incisions clean dry    Disposition:  Home    Patient already has all post operative medications at home.  Copies scanned to media of medications and [facility] report.  Patient already has follow up arrangements made.   Follow-up Information       Venkat Brown MD .    Specialty: Internal Medicine  Contact information:  [facility] [address]  [phone]                                 Signed:  Magui Ribeiro MD  2023

## 2023-08-18 NOTE — PROGRESS NOTES
Continued Stay Note  [facility]     Patient Name: Magui Dumont  MRN: [id number]  Today's Date: 8/18/2023    Admit Date: 8/17/2023    Plan: Home no needs   Discharge Plan       Row Name 08/18/23 1337       Plan    Plan Home no needs    Plan Comments Introduced self and role of CCP. Patient confirmed DC plan is to return home. Patient stated she is independent with ADL's and uses no DMEs.  Spouse will assist as needed and will provide transportation at DC. Denies any needs/equipment.                   Discharge Codes    No documentation.                       Magui Fonseca RN

## 2023-08-18 NOTE — OUTREACH NOTE
Prep Survey      Flowsheet Row Responses   [facility] patient discharged from? [place]   Is LACE score < 7 ? Yes   Eligibility [facility]   Date of Admission 08/17/23   Date of Discharge 08/18/23   Discharge Disposition Home or Self Care   Discharge diagnosis Uterovaginal prolapse,   Laparoscopic paravaginal repair   Does the patient have one of the following disease processes/diagnoses(primary or secondary)? General Surgery   Does the patient have Home health ordered? No   Is there a DME ordered? No   Prep survey completed? Yes            JEANMARIE TOM - Registered Nurse

## 2023-08-18 NOTE — CONSULTS
CONSULT NOTE    INTERNAL MEDICINE   [facility]       Patient Identification:  Name: Magui Dumont  Age: 67 y.o.  Sex: female  :  1955  MRN: [id number]             Date of Consultation:  23       Primary Care Physician: Venkat Brown MD                               Requesting Physician: dr magui ribeiro  Reason for Consultation:medical management, diabetes    Chief Complaint:  67 year old female who was admitted after surgery by dr ribeiro; she has a history of  hypertension, hypothyroidism and diabetes;  her blood sugar was over 300 postop;     History of Present Illness:   As above      Past Medical History:  Past Medical History:   Diagnosis Date    Hyperlipidemia     Hypertension     Hypothyroidism     Incontinence     PONV (postoperative nausea and vomiting)     Seasonal allergic rhinitis     Type 2 diabetes mellitus     Uterovaginal prolapse     CYTOCELE, VAGINAL ENTEROCELE AND RECTOCELE    Vitamin D deficiency      Past Surgical History:  Past Surgical History:   Procedure Laterality Date    CHOLECYSTECTOMY      COLONOSCOPY N/A 9/15/2017    Procedure: COLONOSCOPY;  Surgeon: Brian Puckett MD;  Location: [facility] ENDOSCOPY;  Service:     D & C HYSTEROSCOPY N/A 2023    Procedure: HYSTEROSCOPY with fractional DILATATION AND CURETTAGE pap smear cystourethroscopy;  Surgeon: Magui Ribeiro MD;  Location: [facility] MAIN OR;  Service: Gynecology;  Laterality: N/A;    EYE SURGERY Left     cataract     TONSILLECTOMY        Home Meds:  Medications Prior to Admission   Medication Sig Dispense Refill Last Dose    citalopram (CeleXA) 20 MG tablet Take 1 tablet by mouth Daily. 90 tablet 1 2023 at 0500    Hydrocortisone, Perianal, (ANUSOL-HC) 2.5 % rectal cream Insert  into the rectum 2 (Two) Times a Day. (Patient taking differently: Insert  into the rectum 2 (Two) Times a Day As Needed for Hemorrhoids.) 28 g 2 2023 at 0500    levothyroxine (SYNTHROID, LEVOTHROID) 100 MCG tablet Take  1 tablet by mouth once daily (Patient taking differently: Take 1 tablet by mouth Every Morning.) 30 tablet 5 8/14/2023    lisinopril-hydrochlorothiazide (PRINZIDE,ZESTORETIC) 20-12.5 MG per tablet Take 1 tablet by mouth Daily.   8/16/2023 at 2200    metFORMIN (GLUCOPHAGE) 1000 MG tablet TAKE 1 TABLET BY MOUTH TWICE DAILY WITH MEALS (Patient taking differently: Take 1 tablet by mouth 2 (Two) Times a Day With Meals.) 180 tablet 3 8/16/2023 at 2200    omeprazole (priLOSEC) 20 MG capsule Take 1 capsule by mouth Daily.   8/17/2023 at 0500    rosuvastatin (CRESTOR) 20 MG tablet Take 1 tablet by mouth Every Night. 90 tablet 3 8/16/2023 at 2200    Soliqua 100-33 UNT-MCG/ML solution pen-injector injection INJECT 55 UNITS UNDER THE SKIN INTO THE APPROPRIATE AREA AS DIRECTED DAILY 15 mL 5 8/17/2023 at 0500    glucose blood test strip OneTouch Verio In Vitro Strip; Patient Sig: CHECK BLOOD GLUCOSE 1 TIME A DAY; E11.65 100 each 3     Insulin Pen Needle (ReliOn Pen Needles) 32G X 4 MM misc 1 each by Other route Daily. 100 each 3     nystatin 987179 UNIT/GM powder APPLY  POWDER TOPICALLY TO AFFECTED AREA AS DIRECTED TWICE DAILY (Patient taking differently: Apply 1 application  topically to the appropriate area as directed 2 (Two) Times a Day As Needed (TO RED AREA  AS NEEDED).) 60 g 2 7/27/2023    Omega-3 Fatty Acids (FISH OIL) 1000 MG capsule delayed-release Take 1 capsule by mouth Daily. HOLD FOR SURGERY   8/15/2023    ONETOUCH DELICA LANCETS FINE misc        vitamin D (ERGOCALCIFEROL) 1.25 MG (00940 UT) capsule capsule Take 1 capsule by mouth once a week (Patient taking differently: Take 1 capsule by mouth 1 (One) Time Per Week. MONDAYS) 4 capsule 0 8/7/2023     Current Meds:     Current Facility-Administered Medications:     bupivacaine-EPINEPHrine PF (MARCAINE w/EPI) 0.25% -1:027876 injection, , , PRN, Magui Ribeiro MD, 17 mL at 08/17/23 1639    ceFAZolin in dextrose (ANCEF) IVPB solution 2,000 mg, 2,000 mg, Intravenous,  Q8H, Magui Ribeiro MD    [START ON 8/18/2023] citalopram (CeleXA) tablet 20 mg, 20 mg, Oral, Daily, Magui Ribeiro MD    clobetasol (TEMOVATE) 0.05 % ointment, , , PRN, Magui Ribeiro MD, 30 g at 08/17/23 1603    dextrose (D50W) (25 g/50 mL) IV injection 25 g, 25 g, Intravenous, Q15 Min PRN, Lesly Hess MD    dextrose (GLUTOSE) oral gel 15 g, 15 g, Oral, Q15 Min PRN, Lesly Hess MD    docusate sodium (COLACE) capsule 100 mg, 100 mg, Oral, BID, Magui Ribeiro MD, 100 mg at 08/17/23 2006    [START ON 8/18/2023] Enoxaparin Sodium (LOVENOX) syringe 40 mg, 40 mg, Subcutaneous, Daily, Magui Ribeiro MD    glucagon (GLUCAGEN) injection 1 mg, 1 mg, Intramuscular, Q15 Min PRN, Lesly Hess MD    lisinopril (PRINIVIL,ZESTRIL) tablet 20 mg, 20 mg, Oral, Nightly **AND** hydroCHLOROthiazide (HYDRODIURIL) tablet 12.5 mg, 12.5 mg, Oral, Nightly, Magui Ribeiro MD    ibuprofen (ADVIL,MOTRIN) tablet 400 mg, 400 mg, Oral, Q4H, Magui Ribeiro MD, 400 mg at 08/17/23 2006    insulin lispro (HUMALOG/ADMELOG) injection 2-9 Units, 2-9 Units, Subcutaneous, 4x Daily AC & at Bedtime, Lesly Hess MD    lactated ringers infusion, 9 mL/hr, Intravenous, Continuous, Juan Van MD, Last Rate: 100 mL/hr at 08/17/23 0727, New Bag at 08/17/23 1648    [START ON 8/18/2023] levothyroxine (SYNTHROID, LEVOTHROID) tablet 100 mcg, 100 mcg, Oral, Q AM, Magui Ribeiro MD    metFORMIN (GLUCOPHAGE) tablet 1,000 mg, 1,000 mg, Oral, BID With Meals, Magui Ribeiro MD    morphine injection 1 mg, 1 mg, Intravenous, Q2H PRN **AND** naloxone (NARCAN) injection 0.4 mg, 0.4 mg, Intravenous, Q5 Min PRN, Magui Ribeiro MD    morphine injection 2 mg, 2 mg, Intravenous, Q2H PRN **AND** naloxone (NARCAN) injection 0.4 mg, 0.4 mg, Intravenous, Q5 Min PRN, Magui Ribeiro MD    ondansetron (ZOFRAN) tablet 4 mg, 4 mg, Oral, Q6H PRN **OR** ondansetron (ZOFRAN) injection 4 mg, 4 mg, Intravenous, Q6H PRN,  Magui Ribeiro MD    oxyCODONE-acetaminophen (PERCOCET)  MG per tablet 1 tablet, 1 tablet, Oral, Q4H PRN, Magui Ribeiro MD    oxyCODONE-acetaminophen (PERCOCET) 5-325 MG per tablet 1 tablet, 1 tablet, Oral, Q4H PRN, Magui Ribeiro MD    [START ON 2023] pantoprazole (PROTONIX) EC tablet 40 mg, 40 mg, Oral, Q AM, Magui Ribeiro MD    promethazine (PHENERGAN) tablet 12.5 mg, 12.5 mg, Oral, Q6H PRN **OR** promethazine (PHENERGAN) suppository 12.5 mg, 12.5 mg, Rectal, Q6H PRN, Magui Ribeiro MD    rosuvastatin (CRESTOR) tablet 20 mg, 20 mg, Oral, Nightly, Magui Ribeiro MD    sodium chloride (NS) irrigation solution, , , PRN, Magui Ribeiro MD, 1,000 mL at 23 1429    sodium chloride 0.9 % infusion, 100 mL/hr, Intravenous, Continuous, Magui Ribeiro MD    sterile water irrigation solution, , , PRN, Magui Ribeiro MD, 3,000 mL at 23 0900  Allergies:  No Known Allergies  Social History:   Social History     Socioeconomic History    Marital status:      Spouse name: Dallas*    Number of children: 2   Tobacco Use    Smoking status: Never    Smokeless tobacco: Never   Vaping Use    Vaping Use: Never used   Substance and Sexual Activity    Alcohol use: No    Drug use: No    Sexual activity: Yes     Partners: Male     Family History:  Family History   Problem Relation Age of Onset    Lung cancer Mother         smoker    Diabetes type II Father          at 82    Coronary artery disease Father     No Known Problems Sister     No Known Problems Sister     Cancer Brother         cholangiocarcinoma ( 54)    Diabetes type II Brother     Lung cancer Maternal Aunt         Adenocarcinoma (6 sisters)    Colon cancer Neg Hx     Malig Hyperthermia Neg Hx           Review of Systems  See history of present illness and past medical history.  Patient denies headache, dizziness, syncope, falls, trauma, change in vision, change in hearing, change in taste, changes in weight, changes in  "appetite, focal weakness, numbness, or paresthesia.  Patient denies chest pain, palpitations, dyspnea, orthopnea, PND, cough, sinus pressure, rhinorrhea, epistaxis, hemoptysis, nausea, vomiting,hematemesis, diarrhea, constipation or hematochezia. Denies cold or heat intolerance, polydipsia, polyuria, polyphagia. Denies hematuria, pyuria, dysuria, hesitancy, frequency or urgency. Denies consumption of raw and under cooked meats foods or change in water source.  Denies fever, chills, sweats, night sweats.         Vitals:   /61 (BP Location: Left arm, Patient Position: Lying)   Pulse 93   Temp 96.8 øF (36 øC) (Oral)   Resp 16   Ht 170.2 cm (67\")   Wt 72.2 kg (159 lb 2.8 oz)   SpO2 96%   BMI 24.93 kg/mý   I/O:   Intake/Output Summary (Last 24 hours) at 8/17/2023 2019  Last data filed at 8/17/2023 1659  Gross per 24 hour   Intake 4114 ml   Output 760 ml   Net 3354 ml     Exam:  General Appearance:    Alert, cooperative, no distress, appears stated age   Head:    Normocephalic, without obvious abnormality, atraumatic   Eyes:    PERRL, conjunctivae/corneas clear, EOM's intact, both eyes   Ears:    Normal external ear canals, both ears   Nose:   Nares normal, septum midline, mucosa normal, no drainage    or sinus tenderness   Throat:   Lips, tongue, gums normal; oral mucosa pink and moist   Neck:   Supple, symmetrical, trachea midline, no adenopathy;     thyroid:  no enlargement/tenderness/nodules; no carotid    bruit or JVD   Back:     Symmetric, no curvature, ROM normal, no CVA tenderness   Lungs:     Decreased breath sounds bilaterally, respirations unlabored   Chest Wall:    No tenderness or deformity    Heart:    Regular rate and rhythm, S1 and S2 normal, no murmur, rub   or gallop   Abdomen:     Soft, nontender, bowel sounds active all four quadrants,     no masses, no hepatomegaly, no splenomegaly   Extremities:   Extremities normal, atraumatic, no cyanosis or edema   Pulses:   Pulses palpable in all " extremities; symmetric all extremities   Skin:   Skin color normal, Skin is warm and dry,  no rashes or palpable lesions   Neurologic:   CNII-XII intact, motor strength grossly intact, sensation grossly intact to light touch, no focal deficits noted       Data Review:  Labs in chart were reviewed.            Imaging Results (Last 7 Days)       ** No results found for the last 168 hours. **          Past Medical History:   Diagnosis Date    Hyperlipidemia     Hypertension     Hypothyroidism     Incontinence     PONV (postoperative nausea and vomiting)     Seasonal allergic rhinitis     Type 2 diabetes mellitus     Uterovaginal prolapse     CYTOCELE, VAGINAL ENTEROCELE AND RECTOCELE    Vitamin D deficiency        Assessment:  Active Hospital Problems    Diagnosis  POA    **Uterovaginal prolapse, incomplete [N81.2]  Unknown    Cystocele, midline [N81.11]  Yes    Cystocele, lateral [N81.12]  Yes    Vaginal enterocele [N81.5]  Yes    Rectocele [N81.6]  Yes    Female stress incontinence [N39.3]  Yes      Resolved Hospital Problems   No resolved problems to display.   Diabetes  Hypertension  hypothyroidism    Plan:  Accu checks, insulin sliding scale  Check hgba1c  Monitor bp  Will follow with you  Thanks for involving us in her care    Lesly Hess MD   8/17/2023  20:19 EDT

## 2023-08-18 NOTE — PROGRESS NOTES
Name: Magui Dumont ADMIT: 2023   : 1955  PCP: Venkat Brown MD    MRN: [id number] LOS: 0 days   AGE/SEX: 67 y.o. female  ROOM: [facility]   Subjective   No chief complaint on file.  Cc- post op pain    Pain is fair      ROS  No f/c  No n/v  No cp/palp  No soa/cough    Objective   Vital Signs  Temp:  [96.8 øF (36 øC)-98.2 øF (36.8 øC)] 98 øF (36.7 øC)  Heart Rate:  [] 101  Resp:  [15-18] 18  BP: ()/() 95/60  SpO2:  [91 %-99 %] 95 %  on  Flow (L/min):  [2-6] 2;   Device (Oxygen Therapy): room air  Body mass index is 24.93 kg/mý.    Physical Exam  Constitutional:       General: She is not in acute distress.  HENT:      Head: Normocephalic and atraumatic.   Eyes:      General: No scleral icterus.  Pulmonary:      Effort: Pulmonary effort is normal. No respiratory distress.   Musculoskeletal:      Cervical back: Neck supple.   Neurological:      Mental Status: She is alert and oriented to person, place, and time.   Psychiatric:         Mood and Affect: Mood normal.         Behavior: Behavior normal.         Thought Content: Thought content normal.       Results Review:       I reviewed the patient's new clinical results.  Results from last 7 days   Lab Units 23  0532   HEMOGLOBIN g/dL 10.2*     Estimated Creatinine Clearance: 76.7 mL/min (by C-G formula based on SCr of 0.8 mg/dL).          Coag     HbA1C   Lab Results   Component Value Date    HGBA1C 9.40 (H) 2023    HGBA1C 7.40 (H) 2023    HGBA1C 7.5 2022     Infection     Radiology(recent) No radiology results for the last day  No results found for: TROPONINT, TROPONINI, BNP  No components found for: TSH;2    citalopram, 20 mg, Oral, Daily  docusate sodium, 100 mg, Oral, BID  enoxaparin, 40 mg, Subcutaneous, Daily  lisinopril, 20 mg, Oral, Nightly   And  hydroCHLOROthiazide, 12.5 mg, Oral, Nightly  ibuprofen, 400 mg, Oral, Q4H  insulin lispro, 2-9 Units, Subcutaneous, 4x Daily AC & at Bedtime  levothyroxine, 100  mcg, Oral, Q AM  metFORMIN, 1,000 mg, Oral, BID With Meals  pantoprazole, 40 mg, Oral, Q AM  rosuvastatin, 20 mg, Oral, Nightly      lactated ringers, 9 mL/hr, Last Rate: Stopped (08/17/23 2222)  sodium chloride, 100 mL/hr, Last Rate: 100 mL/hr (08/18/23 0501)    Diet: Diabetic Diets; Consistent Carbohydrate; Texture: Regular Texture (IDDSI 7); Fluid Consistency: Thin (IDDSI 0)      Assessment & Plan      Active Hospital Problems    Diagnosis  POA    **Uterovaginal prolapse, incomplete [N81.2]  Unknown    Cystocele, midline [N81.11]  Yes    Cystocele, lateral [N81.12]  Yes    Vaginal enterocele [N81.5]  Yes    Rectocele [N81.6]  Yes    Female stress incontinence [N39.3]  Yes      Resolved Hospital Problems   No resolved problems to display.       On sliding scale insulin and metformin, monitor BG  On Agents for HTN, monitor BP. Added holding parameters as BP could be lower on POD1 related to pain meds and anesthesia.   On PPI for GERD    She feels she is doing well. She expects to be discharged later today depending on Dr. Ribeiro's eval. If dcd can follow up with Venkat Brown MD       family       Toño Ghotra MD  [facility]  08/18/23  11:45 EDT

## 2023-08-19 LAB
LAB AP CASE REPORT: NORMAL
PATH REPORT.FINAL DX SPEC: NORMAL
PATH REPORT.GROSS SPEC: NORMAL

## 2023-08-20 NOTE — CASE MANAGEMENT/SOCIAL WORK
Case Management Discharge Note      Final Note: DC home         Selected Continued Care - Discharged on 8/18/2023 Admission date: 8/17/2023 - Discharge disposition: Home or Self Care      Destination    No services have been selected for the patient.                Durable Medical Equipment    No services have been selected for the patient.                Dialysis/Infusion    No services have been selected for the patient.                Home Medical Care    No services have been selected for the patient.                Therapy    No services have been selected for the patient.                Community Resources    No services have been selected for the patient.                Community & DME    No services have been selected for the patient.                         Final Discharge Disposition Code: 01 - home or self-care

## 2023-08-20 NOTE — OP NOTE
OPERATIVE REPORT      [facility]     Patient:    Magui Dumont       :    1955      Date of Operations:  2023     PREOPERATIVE DIAGNOSES:   1.  Uterovaginal prolapse, N81.2.  2.  Cystocele, N81.11, N81.12.   3.  Rectocele, N81.6.   4.  Enterocele, N81.5.   5.  Stress urinary incontinence, N39.3     POSTOPERATIVE DIAGNOSES: Same plus postoperative intestinal peritoneal adhesions,                                                          K66.0     SURGEON: Magui Ribeiro M.D., MSc, FACOG, FACS     ASSISTANT:   Katie Vargas CST                   PROCEDURES PERFORMED:   Laparoscopic uterosacral ligament colpopexy, 63117  Laparoscopic lysis of intestinal adhesions, 17454  Laparoscopic paravaginal repair, 14051  Laparoscopic hysterectomy with bilateral salpingectomy,        5.   Laparoscopic abdominal enterocele repair, 55555        6.   Pubovaginal sling, retropubic, porcine, 06253        7.   Posterior colporrhaphy, 69627        9.   Cystourethroscopy     FINDING(S): On cystourethroscopy following all the procedures, there was bilateral efflux of Pyridium stained urine from both the right and the left ureteral orifices. There were no lesions or foreign material of the bladder or the urethra.  The left ovary and the right ovary both appeared normal and remained in situ. There were multiple adhesions between the bowel and vagina and the peritoneum of the anterior abdomen and pelvis.      SPECIMENS:   Uterus and cervix   Right fallopian tube  Left fallopian tube    DESCRIPTION OF PROCEDURE(S): The patient was taken to the Operating Room with a peripheral IV in place. She underwent the induction of general endotracheal anesthesia, was prepped and draped in the dorsal lithotomy position in [facility]. Cystourethroscopy showed no lesions or foreign material of the bladder or the urethra and there was bilateral efflux of Pyridium   stained urine from both the right and the left ureteral  orifices. The cystoscope was removed and a Gustafson catheter was placed and set to straight drainage.  A uterine manipulator was sewn into the uterine cervix for manipulation.   A left upper quadrant skin incision was made, a Veress needle inserted, and a pneumoperitoneum introduced. The Veress needle was removed and a 5-millimeter Opti-view was placed in the left upper quadrant. Under direct visualization, an 11-millimeter left lateral and 5 higinio-meter suprapubic, sub umbilical, and right lateral ports were placed. The use of sharp dissection and cautery was required to release the dense adhesions of the bowel to the posterior vagina and the peritoneum of the anterior abdomen and pelvis to reach the anatomic sites for the intended surgery. The patient was placed in Trendelenburg and the bowel lifted superiorly. The left and right fallopian tubes were removed with the harmonic scalpel and sent to Pathology. The ovaries bilaterally appeared normal and remained in situ.  The Harmonic scalpel was used to create a bladder flap anteriorly and to skeletonize and take the uterine arteries bilaterally. The monopolar J-hook cautery was used to completely remove and detach the cervix from the vaginal apex. The uterus with cervix was sent to Pathology. Hemostasis was excellent. The apex of the vagina was closed laparoscopically with interrupted 0 Vicryl sutures. With a Breisky retractor in the vagina, the posterior rectovaginal fascia was dissected from the rectovaginal serosa and with a Lucite dilator in the vagina, the pubo-cervical fascia was dissected from the pubo-cervical serosa. With a Breisky retractor in the vagina, the paravaginal tissue on the left and on the right was reattached with #1 Prolene and the uterosacral ligaments were attached to the paravaginal tissue ipsilaterally bilaterally to accomplish the laparoscopic bilateral paravaginal repair. With a Breisky retractor in the vagina, the uterosacral ligaments  were placed on stretch and #1 Prolene was used to go through the right uterosacral ligament, the right rectovaginal fascia, the right pubocervical fascia and held. #1 Prolene was used to go through the left uterosacral ligament, the left rectovaginal fascia, the left pubocervical fascia and both these sutures were tied accomplishing the laparoscopic uterosacral ligament colpopexy.  #1 Prolene was used through the abdominal approach of a laparoscopic enterocele repair attaching the superior most portion of the detached rectovaginal fascia and the pubocervical fascia at the apex of the vagina in the midline to the right and left uterosacral ligaments and these sutures were tied laparoscopically. Cystourethroscopy showed efflux of urine from the left ureteral the right ureteral orifices. The cystoscope was removed and a Gustafson catheter was placed and set to straight drainage. The CO2 gas was allowed to escape and the left 11-millimeter port was closed with Ben Shepard Sure Close with 0 Vicryl. The remainder of the ports were removed under direct visualization, and all were hemostatic and these were closed with 4-0 Vicryl. A sub-urethral incision was made and dissected bilaterally. A sheet of porcine material was used to create a porcine sling. This retropubic 1-centimeter porcine sling was placed bilaterally.  Cystourethroscopy showed no lesions or foreign material of the bladder or the urethra. The pubovaginal sling was adjusted without tension so that a curved Spangler easily fit between the sub-urethral tissue and the tape. The excess suprapubic tape ends were trimmed and the skin lifted away. These incisions were closed with 4- 0 Vicryl suture. With a Gustafson catheter in place the suburethral incision was closed with 2-0 Vicryl and was hemostatic. A posterior vaginal incision was made and the rectum dissected from the vagina. The posterior colporrhaphy was performed, plicating the rectovaginal fascia in the midline  with 0 Vicryl interrupted sutures. The perineorrhaphy was performed with interrupted 0 Vicryl.  The vaginal epithelium was closed with 2-0 Vicryl. Cystourethroscopy showed no lesions or foreign material of the bladder or the urethra and there was bilateral efflux of Pyridium stained urine from both the right and the left ureteral orifices. The cystoscope was removed and a Gustafson catheter was placed and set to straight drainage.  A vaginal pack was placed into the vagina. The lysis of labial adhesions was performed. The patient was taken out of the dorsal lithotomy position, awakened from anesthesia and taken to the Recovery Room in good condition.  There were no complications to the procedures.  All final needle, sponge, and instrument counts were reported as correct.         ESTIMATED BLOOD LOSS:  350 mL      FLUIDS:  3500 mL and albumin 250 mL     URINE OUTPUT: 210 mL            Magui Ribeiro MD MSc, FACOG, FACS

## 2023-08-21 ENCOUNTER — TRANSITIONAL CARE MANAGEMENT TELEPHONE ENCOUNTER (OUTPATIENT)
Dept: CALL CENTER | Facility: HOSPITAL | Age: 68
End: 2023-08-21
Payer: MEDICARE

## 2023-08-21 NOTE — OUTREACH NOTE
Call Center TCM Note      Flowsheet Row Responses   [facility] patient discharged from? [place]   Does the patient have one of the following disease processes/diagnoses(primary or secondary)? General Surgery   TCM attempt successful? Yes   Call start time 1608   Call end time 1610   Discharge diagnosis Uterovaginal prolapse,   Laparoscopic paravaginal repair   Meds reviewed with patient/caregiver? Yes   Is the patient having any side effects they believe may be caused by any medication additions or changes? No   Does the patient have all medications related to this admission filled (includes all antibiotics, pain medications, etc.) N/A   Is the patient taking all medications as directed (includes completed medication regime)? Yes   Comments Pt states PCP office stated she will have the hosp dc fu apt during her Medicare Wellness visit scheduled for 9/20/23-will route message to PCP group   Does the patient have an appointment with their PCP within 7-14 days of discharge? Other   Nursing Interventions Routed TCM call to PCP office, PCP office requested to make appointment - message sent   Has home health visited the patient within 72 hours of discharge? N/A   Psychosocial issues? No   Did the patient receive a copy of their discharge instructions? Yes   Nursing interventions Reviewed instructions with patient   What is the patient's perception of their health status since discharge? Improving   Nursing interventions Nurse provided patient education   Is the patient /caregiver able to teach back basic post-op care? Take showers only when approved by MD-sponge bathe until then, Drive as instructed by MD in discharge instructions, No tub bath, swimming, or hot tub until instructed by MD, Keep incision areas clean,dry and protected, Lifting as instructed by MD in discharge instructions, Continue use of incentive spirometry at least 1 week post discharge   Is the patient/caregiver able to teach back signs and  symptoms of incisional infection? Increased redness, swelling or pain at the incisonal site, Increased drainage or bleeding, Incisional warmth, Fever, Pus or odor from incision   Is the patient/caregiver able to teach back steps to recovery at home? Set small, achievable goals for return to baseline health, Rest and rebuild strength, gradually increase activity, Weigh daily, Practice good oral hygiene, Make a list of questions for surgeon's appointment, Eat a well-balance diet   If the patient is a current smoker, are they able to teach back resources for cessation? Not a smoker   Is the patient/caregiver able to teach back the hierarchy of who to call/visit for symptoms/problems? PCP, Specialist, Home health nurse, Urgent Care, ED, 911 Yes   TCM call completed? Yes   Call end time 1610            Sonia Mckenzie RN    8/21/2023, 16:12 EDT

## 2023-08-22 ENCOUNTER — OFFICE VISIT (OUTPATIENT)
Dept: INTERNAL MEDICINE | Age: 68
End: 2023-08-22
Payer: MEDICARE

## 2023-08-22 VITALS
HEIGHT: 67 IN | BODY MASS INDEX: 24.33 KG/M2 | DIASTOLIC BLOOD PRESSURE: 80 MMHG | OXYGEN SATURATION: 99 % | TEMPERATURE: 96.9 F | WEIGHT: 155 LBS | HEART RATE: 93 BPM | SYSTOLIC BLOOD PRESSURE: 130 MMHG

## 2023-08-22 DIAGNOSIS — R23.8 SKIN BULLA: Primary | ICD-10-CM

## 2023-08-22 PROCEDURE — 99213 OFFICE O/P EST LOW 20 MIN: CPT | Performed by: INTERNAL MEDICINE

## 2023-08-22 PROCEDURE — 3075F SYST BP GE 130 - 139MM HG: CPT | Performed by: INTERNAL MEDICINE

## 2023-08-22 PROCEDURE — 3046F HEMOGLOBIN A1C LEVEL >9.0%: CPT | Performed by: INTERNAL MEDICINE

## 2023-08-22 PROCEDURE — 3079F DIAST BP 80-89 MM HG: CPT | Performed by: INTERNAL MEDICINE

## 2023-08-22 RX ORDER — PHENAZOPYRIDINE HYDROCHLORIDE 200 MG/1
1 TABLET, FILM COATED ORAL 3 TIMES DAILY
COMMUNITY
Start: 2023-08-07

## 2023-08-22 NOTE — PROGRESS NOTES
"    I N T E R N A L  M E D I C I N E    J U N O H  K I M,  M D      ENCOUNTER DATE:  08/22/2023    Magui Dumont / 67 y.o. / female    CHIEF COMPLAINT / REASON FOR OFFICE VISIT     Blister (Right arm )      ASSESSMENT & PLAN     1. Skin bulla      No orders of the defined types were placed in this encounter.    No orders of the defined types were placed in this encounter.      SUMMARY/DISCUSSION  Isolated bulla under right upper arm  Advised to monitor for additional lesions or growth/expansion of the bullae in which case she was advised to let me know and to see her dermatologist for further evaluation. DDx may include bullous pemphigoid but due to isolated lesion this is less likely.  Will monitor for now.       Next Appointment with me: 9/20/2023    No follow-ups on file.        VITAL SIGNS     Vitals:    08/22/23 1000   BP: 130/80   Pulse: 93   Temp: 96.9 øF (36.1 øC)   SpO2: 99%   Weight: 70.3 kg (155 lb)   Height: 170.2 cm (67\")       BP Readings from Last 3 Encounters:   08/22/23 130/80   08/18/23 123/66   08/09/23 138/83     Wt Readings from Last 3 Encounters:   08/22/23 70.3 kg (155 lb)   08/17/23 72.2 kg (159 lb 2.8 oz)   08/09/23 72.2 kg (159 lb 3.2 oz)     Body mass index is 24.28 kg/mý.    Blood pressure readings recorded on patient flowsheet:       No data to display                MEDICATIONS AT THE TIME OF OFFICE VISIT     Current Outpatient Medications on File Prior to Visit   Medication Sig    citalopram (CeleXA) 20 MG tablet Take 1 tablet by mouth Daily.    glucose blood test strip OneTouch Verio In Vitro Strip; Patient Sig: CHECK BLOOD GLUCOSE 1 TIME A DAY; E11.65    Insulin Pen Needle (ReliOn Pen Needles) 32G X 4 MM misc 1 each by Other route Daily.    levothyroxine (SYNTHROID, LEVOTHROID) 100 MCG tablet Take 1 tablet by mouth once daily    lisinopril-hydrochlorothiazide (PRINZIDE,ZESTORETIC) 20-12.5 MG per tablet Take 1 tablet by mouth Daily.    metFORMIN (GLUCOPHAGE) 1000 MG tablet TAKE 1 " TABLET BY MOUTH TWICE DAILY WITH MEALS (Patient taking differently: Take 1 tablet by mouth 2 (Two) Times a Day With Meals.)    nystatin 913036 UNIT/GM powder APPLY  POWDER TOPICALLY TO AFFECTED AREA AS DIRECTED TWICE DAILY (Patient taking differently: Apply 1 application  topically to the appropriate area as directed 2 (Two) Times a Day As Needed (TO RED AREA  AS NEEDED).)    Omega-3 Fatty Acids (FISH OIL) 1000 MG capsule delayed-release Take 1 capsule by mouth Daily. HOLD FOR SURGERY    omeprazole (priLOSEC) 20 MG capsule Take 1 capsule by mouth Daily.    ONETOUCH DELICA LANCETS FINE misc     phenazopyridine (PYRIDIUM) 200 MG tablet Take 1 tablet by mouth 3 (Three) Times a Day.    rosuvastatin (CRESTOR) 20 MG tablet Take 1 tablet by mouth Every Night.    Soliqua 100-33 UNT-MCG/ML solution pen-injector injection INJECT 55 UNITS UNDER THE SKIN INTO THE APPROPRIATE AREA AS DIRECTED DAILY    vitamin D (ERGOCALCIFEROL) 1.25 MG (57439 UT) capsule capsule Take 1 capsule by mouth 1 (One) Time Per Week.     No current facility-administered medications on file prior to visit.          HISTORY OF PRESENT ILLNESS     The patient underwent a hysterectomy, bladder prolapse repair and rectocele repair on 08/18/2023 with Dr. Ribeiro. She has been doing well since surgery and denies any infections or complications from her surgery.    On 08/21/2023 she noticed an isolated non erythematous bullous skin lesion under her right upper arm.  She first noticed it when she felt drainage coming from her upper arm.  Denies fever or significant discomfort.  Denies significant pruritus or redness.  Denies any other skin lesions.  Patient denies any injury to the affected area.       REVIEW OF SYSTEMS             PHYSICAL EXAMINATION     Physical Exam  Isolated bullous lesion 1 cm under right upper arm; no significant erythema, warmth or tenderness on exam       REVIEWED DATA     Labs:     Lab Results   Component Value Date     08/09/2023     K 4.2 08/09/2023    CALCIUM 9.7 08/09/2023    AST 25 11/15/2021    ALT 26 11/15/2021    BUN 15 08/09/2023    CREATININE 0.80 08/09/2023    CREATININE 0.65 02/17/2023    CREATININE 0.50 (L) 10/27/2022    EGFRIFNONA 78 11/15/2021    EGFRIFAFRI 90 11/15/2021       Lab Results   Component Value Date    HGBA1C 9.40 (H) 08/18/2023    HGBA1C 7.40 (H) 03/31/2023    HGBA1C 7.5 07/20/2022       Lab Results   Component Value Date    LDL 80 07/20/2022    LDL 68 03/30/2021    LDL 67 10/06/2020    HDL 42 07/20/2022    HDL 47 03/30/2021    TRIG 131 07/20/2022    TRIG 135 03/30/2021       Lab Results   Component Value Date    TSH 1.160 07/20/2022    TSH 2.200 08/11/2021    TSH 0.252 (L) 03/30/2021    FREET4 1.52 07/20/2022    FREET4 1.63 08/11/2021    FREET4 1.95 (H) 10/06/2020       Lab Results   Component Value Date    WBC 7.39 08/09/2023    HGB 10.2 (L) 08/18/2023     08/09/2023       Lab Results   Component Value Date    MALBCRERATIO <9 03/30/2021            Imaging:           Medical Tests:           Summary of old records / correspondence / consultant report:           Request outside records:     Transcribed from ambient dictation for Venkat Brown MD by Rupinder Obando.  08/22/23   11:50 EDT    Patient or patient representative verbalized consent to the visit recording.  I have personally performed the services described in this document as transcribed by the above individual, and it is both accurate and complete.  Venkat Brown MD  8/22/2023  13:01 EDT

## 2023-08-22 NOTE — PATIENT INSTRUCTIONS
** IMPORTANT MESSAGE FROM DR. ZUNIGA **    In our office, your satisfaction is VERY important to us.     You may receive a survey from [facility] by mail or E-mail for you to provide feedback about your visit. This information is invaluable for me to know what we can do to improve our services.     I ask that you please take a few minutes to complete the survey and let us know how we are doing in serving your needs. (You may receive the survey more than once for multiple visits)    Thank You !    Dr. Zuniga    _________________________________________________________________________________________________________________________      ** ADDITIONAL INSTRUCTION / REMINDERS FROM DR. ZUNIGA **

## 2023-09-18 DIAGNOSIS — F32.A DEPRESSION, UNSPECIFIED DEPRESSION TYPE: ICD-10-CM

## 2023-09-18 RX ORDER — CITALOPRAM 20 MG/1
TABLET ORAL
Qty: 90 TABLET | Refills: 1 | Status: SHIPPED | OUTPATIENT
Start: 2023-09-18

## 2023-09-20 ENCOUNTER — OFFICE VISIT (OUTPATIENT)
Dept: INTERNAL MEDICINE | Age: 68
End: 2023-09-20
Payer: MEDICARE

## 2023-09-20 ENCOUNTER — TELEPHONE (OUTPATIENT)
Dept: INTERNAL MEDICINE | Age: 68
End: 2023-09-20

## 2023-09-20 VITALS
HEART RATE: 100 BPM | SYSTOLIC BLOOD PRESSURE: 110 MMHG | TEMPERATURE: 96.8 F | HEIGHT: 67 IN | WEIGHT: 155 LBS | DIASTOLIC BLOOD PRESSURE: 70 MMHG | BODY MASS INDEX: 24.33 KG/M2 | OXYGEN SATURATION: 100 %

## 2023-09-20 DIAGNOSIS — E78.5 DYSLIPIDEMIA: Chronic | ICD-10-CM

## 2023-09-20 DIAGNOSIS — E03.8 HYPOTHYROIDISM DUE TO HASHIMOTO'S THYROIDITIS: Chronic | ICD-10-CM

## 2023-09-20 DIAGNOSIS — E06.3 HYPOTHYROIDISM DUE TO HASHIMOTO'S THYROIDITIS: Chronic | ICD-10-CM

## 2023-09-20 DIAGNOSIS — Z79.4 TYPE 2 DIABETES MELLITUS WITH HYPERGLYCEMIA, WITH LONG-TERM CURRENT USE OF INSULIN: Primary | Chronic | ICD-10-CM

## 2023-09-20 DIAGNOSIS — Z00.00 MEDICARE ANNUAL WELLNESS VISIT, SUBSEQUENT: ICD-10-CM

## 2023-09-20 DIAGNOSIS — E11.65 TYPE 2 DIABETES MELLITUS WITH HYPERGLYCEMIA, WITH LONG-TERM CURRENT USE OF INSULIN: Primary | Chronic | ICD-10-CM

## 2023-09-20 RX ORDER — SEMAGLUTIDE 1.34 MG/ML
1 INJECTION, SOLUTION SUBCUTANEOUS WEEKLY
Qty: 3 ML | Refills: 2 | Status: SHIPPED | OUTPATIENT
Start: 2023-09-20

## 2023-09-20 NOTE — PROGRESS NOTES
I N T E R N A L  M E D I C I N E    J U N O H  K I M,  M D      ENCOUNTER DATE:  09/20/2023    Magui Dumont / 67 y.o. / female    CHIEF COMPLAINT / REASON FOR OFFICE VISIT     Medicare Wellness-subsequent (8/11/22), Diabetes, and Hypothyroidism      ASSESSMENT & PLAN     Problem List Items Addressed This Visit          High    Type 2 diabetes mellitus with hyperglycemia, with long-term current use of insulin - Primary (Chronic)    Overview     * Previously managed by endocrinologist (Bartolo). Will not be seeing endocrinology going forward.          Current Assessment & Plan     Lab Results   Component Value Date    HGBA1C 8.00 (H) 09/13/2023    HGBA1C 9.40 (H) 08/18/2023    HGBA1C 7.40 (H) 03/31/2023      Currently taking 55 units of Soliqua daily.   Will discontinue soliqua and start Ozempic/Tresiba.   Start Ozempic at 1 mg weekly and Tresiba 30 units daily. Will need to titrate Tresiba based on response.          Relevant Medications    glucose blood test strip    metFORMIN (GLUCOPHAGE) 1000 MG tablet    lisinopril-hydrochlorothiazide (PRINZIDE,ZESTORETIC) 20-12.5 MG per tablet    Insulin Pen Needle (ReliOn Pen Needles) 32G X 4 MM misc    vitamin D (ERGOCALCIFEROL) 1.25 MG (43081 UT) capsule capsule    Semaglutide, 1 MG/DOSE, (Ozempic, 1 MG/DOSE,) 4 MG/3ML solution pen-injector    insulin degludec (TRESIBA FLEXTOUCH) 100 UNIT/ML solution pen-injector injection    Other Relevant Orders    Microalbumin / Creatinine Urine Ratio - Urine, Clean Catch    Comprehensive Metabolic Panel       Medium    Dyslipidemia (Chronic)    Relevant Medications    rosuvastatin (CRESTOR) 20 MG tablet    vitamin D (ERGOCALCIFEROL) 1.25 MG (10553 UT) capsule capsule    Other Relevant Orders    Lipid Panel With / Chol / HDL Ratio    Hypothyroidism (Chronic)    Overview     Continue levothyroxine 100 mcg         Relevant Medications    levothyroxine (SYNTHROID, LEVOTHROID) 100 MCG tablet    vitamin D (ERGOCALCIFEROL) 1.25 MG (64092 UT)  "capsule capsule    Other Relevant Orders    TSH+Free T4     Orders Placed This Encounter   Procedures    Pneumococcal Conjugate Vaccine 20-Valent All    Microalbumin / Creatinine Urine Ratio - Urine, Clean Catch    Comprehensive Metabolic Panel    Lipid Panel With / Chol / HDL Ratio    TSH+Free T4     New Medications Ordered This Visit   Medications    Semaglutide, 1 MG/DOSE, (Ozempic, 1 MG/DOSE,) 4 MG/3ML solution pen-injector     Sig: Inject 1 mg under the skin into the appropriate area as directed 1 (One) Time Per Week.     Dispense:  3 mL     Refill:  2    insulin degludec (TRESIBA FLEXTOUCH) 100 UNIT/ML solution pen-injector injection     Sig: Inject 30 Units under the skin into the appropriate area as directed Daily.     Dispense:  9 mL     Refill:  2       SUMMARY/DISCUSSION        Next Appointment with me: Visit date not found    Return in about 4 months (around 1/20/2024) for Reassess chronic medical problems.        VITAL SIGNS     Vitals:    09/20/23 1057   BP: 110/70   Pulse: 100   Temp: 96.8 °F (36 °C)   SpO2: 100%   Weight: 70.3 kg (155 lb)   Height: 170.2 cm (67\")       BP Readings from Last 3 Encounters:   09/20/23 110/70   08/22/23 130/80   08/18/23 123/66     Wt Readings from Last 3 Encounters:   09/20/23 70.3 kg (155 lb)   08/22/23 70.3 kg (155 lb)   08/17/23 72.2 kg (159 lb 2.8 oz)     Body mass index is 24.28 kg/m².    Blood pressure readings recorded on patient flowsheet:       No data to display                MEDICATIONS AT THE TIME OF OFFICE VISIT     Current Outpatient Medications on File Prior to Visit   Medication Sig    citalopram (CeleXA) 20 MG tablet Take 1 tablet by mouth once daily    glucose blood test strip OneTouch Verio In Vitro Strip; Patient Sig: CHECK BLOOD GLUCOSE 1 TIME A DAY; E11.65    Insulin Pen Needle (ReliOn Pen Needles) 32G X 4 MM misc 1 each by Other route Daily.    levothyroxine (SYNTHROID, LEVOTHROID) 100 MCG tablet Take 1 tablet by mouth once daily    " lisinopril-hydrochlorothiazide (PRINZIDE,ZESTORETIC) 20-12.5 MG per tablet Take 1 tablet by mouth Daily.    metFORMIN (GLUCOPHAGE) 1000 MG tablet TAKE 1 TABLET BY MOUTH TWICE DAILY WITH MEALS (Patient taking differently: Take 1 tablet by mouth 2 (Two) Times a Day With Meals.)    nystatin [id number] UNIT/GM powder APPLY  POWDER TOPICALLY TO AFFECTED AREA AS DIRECTED TWICE DAILY (Patient taking differently: Apply 1 application  topically to the appropriate area as directed 2 (Two) Times a Day As Needed (TO RED AREA  AS NEEDED).)    Omega-3 Fatty Acids (FISH OIL) 1000 MG capsule delayed-release Take 1 capsule by mouth Daily. HOLD FOR SURGERY    omeprazole (priLOSEC) 20 MG capsule Take 1 capsule by mouth Daily.    ONETOUCH DELICA LANCETS FINE misc     phenazopyridine (PYRIDIUM) 200 MG tablet Take 1 tablet by mouth 3 (Three) Times a Day.    rosuvastatin (CRESTOR) 20 MG tablet Take 1 tablet by mouth Every Night.    vitamin D (ERGOCALCIFEROL) 1.25 MG (80725 UT) capsule capsule Take 1 capsule by mouth 1 (One) Time Per Week.    [DISCONTINUED] Soliqua 100-33 UNT-MCG/ML solution pen-injector injection INJECT 55 UNITS UNDER THE SKIN INTO THE APPROPRIATE AREA AS DIRECTED DAILY     No current facility-administered medications on file prior to visit.         HISTORY OF PRESENT ILLNESS     The patient's A1c has decreased from 9.4 percent to 8.0 percent. She is currently taking 55 units of Soliqua. She is up-to-date with her diabetic eye exams and dental exams. The patient underwent a hysterectomy, bladder prolapse repair and rectocele repair on 08/18/2023 with Dr. Ribeiro. She recently started exercising again. Her bladder control has improved since her surgery.        Lab Results   Component Value Date    HGBA1C 8.00 (H) 09/13/2023    HGBA1C 9.40 (H) 08/18/2023    HGBA1C 7.40 (H) 03/31/2023    CREATININE 0.80 08/09/2023    LDL 80 07/20/2022    MALBCRERATIO <9 03/30/2021     Blood sugar readings recorded on patient's flowsheet:       No  data to display               70.3 kg (155 lb)    REVIEW OF SYSTEMS           PHYSICAL EXAMINATION     Physical Exam  General: No acute distress  Psych: Normal thought and judgment   Cardiovascular Rate: normal. Rhythm: regular. Heart sounds: normal   Pulm/Chest: Effort normal, breath sounds normal.       REVIEWED DATA     Labs:       Lab Results   Component Value Date     08/09/2023    K 4.2 08/09/2023    CALCIUM 9.7 08/09/2023    AST 25 11/15/2021    ALT 26 11/15/2021    BUN 15 08/09/2023    CREATININE 0.80 08/09/2023    CREATININE 0.65 02/17/2023    CREATININE 0.50 (L) 10/27/2022    EGFRIFNONA 78 11/15/2021    EGFRIFAFRI 90 11/15/2021       Lab Results   Component Value Date    HGBA1C 8.00 (H) 09/13/2023    HGBA1C 9.40 (H) 08/18/2023    HGBA1C 7.40 (H) 03/31/2023       Lab Results   Component Value Date    LDL 80 07/20/2022    LDL 68 03/30/2021    LDL 67 10/06/2020    HDL 42 07/20/2022    HDL 47 03/30/2021    TRIG 131 07/20/2022    TRIG 135 03/30/2021       Lab Results   Component Value Date    TSH 1.160 07/20/2022    TSH 2.200 08/11/2021    TSH 0.252 (L) 03/30/2021    FREET4 1.52 07/20/2022    FREET4 1.63 08/11/2021    FREET4 1.95 (H) 10/06/2020       Lab Results   Component Value Date    WBC 7.39 08/09/2023    HGB 10.2 (L) 08/18/2023     08/09/2023       Lab Results   Component Value Date    MALBCRERATIO <9 03/30/2021        Imaging:           Medical Tests:           Summary of old records / correspondence / consultant report:           Request outside records:     Transcribed from ambient dictation for Venkat Brown MD by Rupinder Obando.  09/20/23   11:57 EDT    Patient or patient representative verbalized consent to the visit recording.  I have personally performed the services described in this document as transcribed by the above individual, and it is both accurate and complete.  Venkat Brown MD  9/20/2023  12:14 EDT

## 2023-09-20 NOTE — PROGRESS NOTES
I N T E R N A L  M E D I C I N E    J U N O H  K I M,  M D      ENCOUNTER DATE:  09/20/2023    Magui Dumont / 67 y.o. / female    MEDICARE ANNUAL WELLNESS VISIT       Chief Complaint:    Chief Complaint   Patient presents with    Medicare Wellness-subsequent     8/11/22    Diabetes    Hypothyroidism       Patient's general assessment of her health since a year ago:     - Compared to one year ago, she feels her physical health is:   [x] About the same  [] Better  [] Little worse  [] Significantly worse  []     - Compared to one year ago, she feels her mental health is   [x] About the same  [] Better  [] Little worse  [] Significantly worse  []     HPI for other active medical problems:           HISTORY       Recent Hospitalizations:    Recent hospitalization?:     If YES, location, date, and diagnoses:     Location:   Date:   Principle Discharge Dx:   Secondary Dx:       Patient Care Team:    Patient Care Team:  Venkat Brown MD as PCP - General (Internal Medicine)  Harsh Velasco MD as Consulting Physician (Obstetrics and Gynecology)      Allergies:  Patient has no known allergies.    Medications:  Current Outpatient Medications on File Prior to Visit   Medication Sig Dispense Refill    citalopram (CeleXA) 20 MG tablet Take 1 tablet by mouth once daily 90 tablet 1    glucose blood test strip OneTouch Verio In Vitro Strip; Patient Sig: CHECK BLOOD GLUCOSE 1 TIME A DAY; E11.65 100 each 3    Insulin Pen Needle (ReliOn Pen Needles) 32G X 4 MM misc 1 each by Other route Daily. 100 each 3    levothyroxine (SYNTHROID, LEVOTHROID) 100 MCG tablet Take 1 tablet by mouth once daily 90 tablet 1    lisinopril-hydrochlorothiazide (PRINZIDE,ZESTORETIC) 20-12.5 MG per tablet Take 1 tablet by mouth Daily.      metFORMIN (GLUCOPHAGE) 1000 MG tablet TAKE 1 TABLET BY MOUTH TWICE DAILY WITH MEALS (Patient taking differently: Take 1 tablet by mouth 2 (Two) Times a Day With Meals.) 180 tablet 3    nystatin 382611 UNIT/GM  powder APPLY  POWDER TOPICALLY TO AFFECTED AREA AS DIRECTED TWICE DAILY (Patient taking differently: Apply 1 application  topically to the appropriate area as directed 2 (Two) Times a Day As Needed (TO RED AREA  AS NEEDED).) 60 g 2    Omega-3 Fatty Acids (FISH OIL) 1000 MG capsule delayed-release Take 1 capsule by mouth Daily. HOLD FOR SURGERY      omeprazole (priLOSEC) 20 MG capsule Take 1 capsule by mouth Daily.      ONETOUCH DELICA LANCETS FINE misc       phenazopyridine (PYRIDIUM) 200 MG tablet Take 1 tablet by mouth 3 (Three) Times a Day.      rosuvastatin (CRESTOR) 20 MG tablet Take 1 tablet by mouth Every Night. 90 tablet 3    vitamin D (ERGOCALCIFEROL) 1.25 MG (71152 UT) capsule capsule Take 1 capsule by mouth 1 (One) Time Per Week. 4 capsule 3    [DISCONTINUED] Soliqua 100-33 UNT-MCG/ML solution pen-injector injection INJECT 55 UNITS UNDER THE SKIN INTO THE APPROPRIATE AREA AS DIRECTED DAILY 15 mL 5     No current facility-administered medications on file prior to visit.        PFSH:     The following portions of the patient's history were reviewed and updated as appropriate: Allergies / Current Medications / Past Medical History / Surgical History / Social History / Family History    Problem List:  Patient Active Problem List   Diagnosis    Benign essential hypertension    Depression    Type 2 diabetes mellitus with hyperglycemia, with long-term current use of insulin    Dyslipidemia    Hypothyroidism    Vitamin D deficiency    Gross hematuria    Postmenopausal bleeding    Abnormal radiographic examination    Thickened endometrium    Uterovaginal prolapse, incomplete    Cystocele, midline    Cystocele, lateral    Vaginal enterocele    Rectocele    Female stress incontinence       Past Medical History:  Past Medical History:   Diagnosis Date    Hyperlipidemia     Hypertension     Hypothyroidism     Incontinence     PONV (postoperative nausea and vomiting)     Seasonal allergic rhinitis     Type 2 diabetes  mellitus     Uterovaginal prolapse     CYTOCELE, VAGINAL ENTEROCELE AND RECTOCELE    Vitamin D deficiency        Past Surgical History:  Past Surgical History:   Procedure Laterality Date    CHOLECYSTECTOMY  2013    COLONOSCOPY N/A 9/15/2017    Procedure: COLONOSCOPY;  Surgeon: Brian Puckett MD;  Location: [facility] ENDOSCOPY;  Service:     D & C HYSTEROSCOPY N/A 2023    Procedure: HYSTEROSCOPY with fractional DILATATION AND CURETTAGE pap smear cystourethroscopy;  Surgeon: Magui Ribeiro MD;  Location: [facility] OR;  Service: Gynecology;  Laterality: N/A;    EYE SURGERY Left     cataract     TONSILLECTOMY      TOTAL LAPAROSCOPIC HYSTERECTOMY, SACROCOLPOPEXY N/A 2023    Procedure: Laparoscopic uterosacral ligament colpopexy sacral colpopexy Laparoscopic paravaginal repair Laparoscopic hysterectomy with bilateral salpingectomy Laparoscopic abdominal enterocele repair Pubovaginal sling Posterior colporrhaphy  Insertion of vaginal grafts Cystourethroscopy;  Surgeon: Magui Ribeiro MD;  Location: [facility] OR;  Service: Gynecology;  Laterality: N/A;       Social History:  Social History     Socioeconomic History    Marital status:      Spouse name: Dallas*    Number of children: 2   Tobacco Use    Smoking status: Never    Smokeless tobacco: Never   Vaping Use    Vaping Use: Never used   Substance and Sexual Activity    Alcohol use: No    Drug use: No    Sexual activity: Yes     Partners: Male       Family History:  Family History   Problem Relation Age of Onset    Lung cancer Mother         smoker    Diabetes type II Father          at 82    Coronary artery disease Father     No Known Problems Sister     No Known Problems Sister     Cancer Brother         cholangiocarcinoma ( 54)    Diabetes type II Brother     Lung cancer Maternal Aunt         Adenocarcinoma (6 sisters)    Colon cancer Neg Hx     Malig Hyperthermia Neg Hx          PATIENT ASSESSMENT     Vitals:  Vitals:    23 1057  "  BP: 110/70   Pulse: 100   Temp: 96.8 °F (36 °C)   SpO2: 100%   Weight: 70.3 kg (155 lb)   Height: 170.2 cm (67\")       BP Readings from Last 3 Encounters:   09/20/23 110/70   08/22/23 130/80   08/18/23 123/66     Wt Readings from Last 3 Encounters:   09/20/23 70.3 kg (155 lb)   08/22/23 70.3 kg (155 lb)   08/17/23 72.2 kg (159 lb 2.8 oz)      Body mass index is 24.28 kg/m².    Blood pressure readings recorded on patient flowsheet:       No data to display                    Review of Systems:    Review of Systems      Physical Exam:    Physical Exam      Reviewed Data:    Labs:   Lab Results   Component Value Date     08/09/2023    K 4.2 08/09/2023    CALCIUM 9.7 08/09/2023    AST 25 11/15/2021    ALT 26 11/15/2021    BUN 15 08/09/2023    CREATININE 0.80 08/09/2023    CREATININE 0.65 02/17/2023    CREATININE 0.50 (L) 10/27/2022    EGFRIFNONA 78 11/15/2021    EGFRIFAFRI 90 11/15/2021       Lab Results   Component Value Date    HGBA1C 8.00 (H) 09/13/2023    HGBA1C 9.40 (H) 08/18/2023    HGBA1C 7.40 (H) 03/31/2023    MICROALBUR <3.0 03/30/2021       Lab Results   Component Value Date    LDL 80 07/20/2022    LDL 68 03/30/2021    LDL 67 10/06/2020    HDL 42 07/20/2022    TRIG 131 07/20/2022    CHOLHDLRATIO 3.5 07/20/2022       Lab Results   Component Value Date    TSH 1.160 07/20/2022    FREET4 1.52 07/20/2022          Lab Results   Component Value Date    WBC 7.39 08/09/2023    HGB 10.2 (L) 08/18/2023    HGB 14.7 08/09/2023    HGB 14.7 02/17/2023     08/09/2023                 No results found for: PSA    Imaging:          Medical Tests:          Screening for Glaucoma:  Previous screening for glaucoma?:   [] YES  [] NO  []     Hearing Loss Screen:  Finger Rub Hearing Test (right ear):   [] PASSED  [] FAILED  [] BORDERLINE  [] HAS HEARING AID  [] USING HEARING AID  [] NOT USING HEARING AID  []     Finger Rub Hearing Test (left ear):   [] PASSED  [] FAILED  [] BORDERLINE  [] HAS HEARING AID  [] USING " HEARING AID  [] NOT USING HEARING AID  []     Urinary Incontinence Screen:  Episodes of urinary incontinence? :  [] YES  [] NO  [] N/A  [] WILL NEED FOLLOWUP  []       Depression Screen:      9/20/2023    11:07 AM   PHQ-2/PHQ-9 Depression Screening   Little Interest or Pleasure in Doing Things 0-->not at all   Feeling Down, Depressed or Hopeless 0-->not at all   PHQ-9: Brief Depression Severity Measure Score 0        PHQ-2:   [] 0 -      NOT DEPRESSED  [] 1 -      NOT DEPRESSED  [] 2 -      NOT DEPRESSED  [] 3-6 -  DEPRESSED (PROCEED TO PHQ-9)  [] N/A - HAS DEPRESSION AND IS ON TREATMENT  [] N/A - HAS DEPRESSION AND IS NOT ON TREATMENT    PHQ-9:   [] 0         NEGATIVE SCREENING FOR DEPRESSION  [] 1-4      MINIMAL DEPRESSION  [] 5-9      MILD DEPRESSIONNOT DEPRESSED  [] 10-14  MODERATE DEPRESSION  [] 15-19  MODERATELY SEVERE DEPRESSION  [] 20-27  SEVERE DEPRESSION    FUNCTIONAL, FALL RISK, & COGNITIVE SCREENING (Components below):    DATA:        9/20/2023    11:07 AM   Functional & Cognitive Status   Do you have difficulty preparing food and eating? No   Do you have difficulty bathing yourself, getting dressed or grooming yourself? No   Do you have difficulty using the toilet? No   Do you have difficulty moving around from place to place? No   Do you have trouble with steps or getting out of a bed or a chair? No   Current Diet Well Balanced Diet   Dental Exam Up to date   Eye Exam Up to date   Exercise (times per week) 5 times per week   Current Exercises Include Walking   Do you need help using the phone?  No   Are you deaf or do you have serious difficulty hearing?  No   Do you need help to go to places out of walking distance? No   Do you need help shopping? No   Do you need help preparing meals?  No   Do you need help with housework?  No   Do you need help with laundry? No   Do you need help taking your medications? No   Do you need help managing money? No   Do you ever drive or ride in a car without wearing a  seat belt? No   Do you have difficulty concentrating, remembering or making decisions? No         A) Assessment of Functional Ability:  (Assessment of ability to perform ADL's (showering/bathing, using toilet, dressing, feeding self, moving self around) and IADL's (use telephone, shop, prepare food, housekeep, do laundry, transport independently, take medications independently, and handle finances)    Degree of functional impairment: (based on assessment noted above)  [] NONE  [] MILD  [] MODERATE  [] SEVERE  [] VERY SEVERE  []     B) Assessment of Fall Risk:  [] LOW  [] MODERATE  [] HIGH  [] VERY HIGH     Need for further evaluation of gait, strength, and balance? :  [] YES  [] NO    Timed Up and Go (TUG):   (>= 12 seconds indicates high risk for falling)    Observable abnormalities included:  [] NORMAL GAIT   [] SLOW CAUTIOUS PACE  [] IMPAIRED BALANCE  [] SHORT STRIDES  [] MINIMAL ARM SWING  [] UNSTEADY (MILD)  [] UNSTEADY (MODERATE)  [] UNSTEADY (SEVERE)  [] SHUFFLING GAIT  [] USES ASSISTIVE DEVICE (CANE) PROPERLY  [] USES ASSISTIVE DEVICE (WALKER) PROPERLY  [] USES ASSISTIVE DEVICE (CANE) PROPERLY  [] DOES NOT USE ASSISTIVE DEVICE PROPERLY  [] IN WHEELCHAIR   [] NOT ABLE TO BE TESTED DUE TO SAFETY CONCERNS  []     C. Assessment of Cognitive Function:    Mini-Cog Test:     1) Registration (3 objects):     [] YES  [] NO   [] N/A HAS DOCUMENTED DEMENTIA     2) Number of objects recalled:   [] 3  [] 2  [] 1  [] 0     3) Clock Draw: Passed? :   [] YES  [] NO   [] N/A  [] N/A HAS DOCUMENTED DEMENTIA     Further evaluation required? :   [] YES  [] NO   [] CLOSE OBSERVATION NEEDED   [] SCHEDULE APPOINTMENT TO EVALUATE FURTHER   [] CONTINUE REGULAR FOLLOWUP AND MANAGEMENT   []     COUNSELING       A. Identification of Health Risk Factors:    Risk factors include: cardiovascular risk factors      B. Age-Appropriate Screening Schedule:  (Refer to the list below for future screening recommendations based on patient's age,  sex and/or medical conditions. Orders for these recommended tests are listed in the plan section. The patient has been provided with a written plan)    Health Maintenance Topics  Health Maintenance   Topic Date Due    DXA SCAN  Never done    ZOSTER VACCINE (1 of 2) Never done    Pneumococcal Vaccine 65+ (2 - PCV) 03/14/2020    COVID-19 Vaccine (4 - Pfizer series) 11/21/2021    URINE MICROALBUMIN  03/30/2022    LIPID PANEL  07/20/2023    INFLUENZA VACCINE  10/01/2023    PAP SMEAR  02/01/2024    HEMOGLOBIN A1C  03/13/2024    DIABETIC FOOT EXAM  03/31/2024    DIABETIC EYE EXAM  06/07/2024    ANNUAL WELLNESS VISIT  09/20/2024    MAMMOGRAM  02/07/2025    TDAP/TD VACCINES (2 - Td or Tdap) 05/26/2027    COLORECTAL CANCER SCREENING  09/15/2027    HEPATITIS C SCREENING  Completed       Health Maintenance Topics Due or Over-Due  Health Maintenance Due   Topic Date Due    DXA SCAN  Never done    ZOSTER VACCINE (1 of 2) Never done    Pneumococcal Vaccine 65+ (2 - PCV) 03/14/2020    COVID-19 Vaccine (4 - Pfizer series) 11/21/2021    URINE MICROALBUMIN  03/30/2022    LIPID PANEL  07/20/2023         C. Advanced Care Planning:    Advance Care Planning   ACP discussion was held with the patient during this visit. Patient has an advance directive (not in EMR), copy requested.       D. Patient Self-Management and Personalized Health Advice:    She has been provided with personalized counseling/information (including brochures/handouts) about:     -- optimizing diet/nutrition plans and reducing risk for cardiovascular disease (heart, stroke, vascular)      She has been recommended for the following preventative services which has been performed today, will be ordered today or ordered/performed on upcoming follow-up visit:     -- NUTRITION counseling provided, CARDIOVASCULAR disease risk reduction counseling performed, FALL RISK assessment / plan of care completed, URINARY incontinence assessment done, OSTEOPOROSIS screening DISCUSSED,  BREAST CANCER screening DISCUSSED, CERVICAL CANCER screening DISCUSSED , vaccination for INFLUENZA administered/ (or recommended), vaccination for SHINGRIX administered  (or recommended), COVID-19 vaccination (and/or booster) recommendations provided, RSV vaccination recommended at pharmacy       E. Miscellaneous Items: [id number]    -Aspirin use counseling: Does not need ASA (and currently is not on it)    -Discussed BMI with her. The BMI is in the acceptable range    -Reviewed use of high risk medication in the elderly: YES    -Reviewed for potential of harmful drug interactions in the elderly: YES        WRAP UP       Assessment & Plan:    1) MEDICARE ANNUAL WELLNESS VISIT    2) OTHER MEDICAL CONDITIONS ADDRESSED TODAY:            Problem List Items Addressed This Visit          High    Type 2 diabetes mellitus with hyperglycemia, with long-term current use of insulin - Primary (Chronic)    Overview     * Previously managed by endocrinologist ([facility]). Will not be seeing endocrinology going forward.          Current Assessment & Plan     Lab Results   Component Value Date    HGBA1C 8.00 (H) 09/13/2023    HGBA1C 9.40 (H) 08/18/2023    HGBA1C 7.40 (H) 03/31/2023      Currently taking 55 units of Soliqua daily.   Will discontinue soliqua and start Ozempic/Tresiba.   Start Ozempic at 1 mg weekly and Tresiba 30 units daily. Will need to titrate Tresiba based on response.          Relevant Medications    glucose blood test strip    metFORMIN (GLUCOPHAGE) 1000 MG tablet    lisinopril-hydrochlorothiazide (PRINZIDE,ZESTORETIC) 20-12.5 MG per tablet    Insulin Pen Needle (ReliOn Pen Needles) 32G X 4 MM misc    vitamin D (ERGOCALCIFEROL) 1.25 MG (33322 UT) capsule capsule    Semaglutide, 1 MG/DOSE, (Ozempic, 1 MG/DOSE,) 4 MG/3ML solution pen-injector    insulin degludec (TRESIBA FLEXTOUCH) 100 UNIT/ML solution pen-injector injection    Other Relevant Orders    Microalbumin / Creatinine Urine Ratio - Urine, Clean Catch     Comprehensive Metabolic Panel       Medium    Dyslipidemia (Chronic)    Relevant Medications    rosuvastatin (CRESTOR) 20 MG tablet    vitamin D (ERGOCALCIFEROL) 1.25 MG (90659 UT) capsule capsule    Other Relevant Orders    Lipid Panel With / Chol / HDL Ratio    Hypothyroidism (Chronic)    Overview     Continue levothyroxine 100 mcg         Relevant Medications    levothyroxine (SYNTHROID, LEVOTHROID) 100 MCG tablet    vitamin D (ERGOCALCIFEROL) 1.25 MG (60463 UT) capsule capsule    Other Relevant Orders    TSH+Free T4                 Orders Placed This Encounter   Procedures    Pneumococcal Conjugate Vaccine 20-Valent All    Microalbumin / Creatinine Urine Ratio - Urine, Clean Catch    Comprehensive Metabolic Panel    Lipid Panel With / Chol / HDL Ratio    TSH+Free T4       Discussion / Summary:        Medications as of TODAY:              Current Outpatient Medications   Medication Sig Dispense Refill    citalopram (CeleXA) 20 MG tablet Take 1 tablet by mouth once daily 90 tablet 1    glucose blood test strip OneTouch Verio In Vitro Strip; Patient Sig: CHECK BLOOD GLUCOSE 1 TIME A DAY; E11.65 100 each 3    Insulin Pen Needle (ReliOn Pen Needles) 32G X 4 MM misc 1 each by Other route Daily. 100 each 3    levothyroxine (SYNTHROID, LEVOTHROID) 100 MCG tablet Take 1 tablet by mouth once daily 90 tablet 1    lisinopril-hydrochlorothiazide (PRINZIDE,ZESTORETIC) 20-12.5 MG per tablet Take 1 tablet by mouth Daily.      metFORMIN (GLUCOPHAGE) 1000 MG tablet TAKE 1 TABLET BY MOUTH TWICE DAILY WITH MEALS (Patient taking differently: Take 1 tablet by mouth 2 (Two) Times a Day With Meals.) 180 tablet 3    nystatin [id number] UNIT/GM powder APPLY  POWDER TOPICALLY TO AFFECTED AREA AS DIRECTED TWICE DAILY (Patient taking differently: Apply 1 application  topically to the appropriate area as directed 2 (Two) Times a Day As Needed (TO RED AREA  AS NEEDED).) 60 g 2    Omega-3 Fatty Acids (FISH OIL) 1000 MG capsule delayed-release Take  1 capsule by mouth Daily. HOLD FOR SURGERY      omeprazole (priLOSEC) 20 MG capsule Take 1 capsule by mouth Daily.      ONETOUCH DELICA LANCETS FINE misc       phenazopyridine (PYRIDIUM) 200 MG tablet Take 1 tablet by mouth 3 (Three) Times a Day.      rosuvastatin (CRESTOR) 20 MG tablet Take 1 tablet by mouth Every Night. 90 tablet 3    vitamin D (ERGOCALCIFEROL) 1.25 MG (81727 UT) capsule capsule Take 1 capsule by mouth 1 (One) Time Per Week. 4 capsule 3    insulin degludec (TRESIBA FLEXTOUCH) 100 UNIT/ML solution pen-injector injection Inject 30 Units under the skin into the appropriate area as directed Daily. 9 mL 2    Semaglutide, 1 MG/DOSE, (Ozempic, 1 MG/DOSE,) 4 MG/3ML solution pen-injector Inject 1 mg under the skin into the appropriate area as directed 1 (One) Time Per Week. 3 mL 2     No current facility-administered medications for this visit.         FOLLOW-UP:            Return in about 4 months (around 1/20/2024) for Reassess chronic medical problems.              Future Appointments   Date Time Provider Department Center   1/24/2024  1:00 PM Venkat Brown MD [facility]           After Visit Summary (AVS) including the Personalized Prevention  Plan Services (PPPS) was either printed and given to the patient at check-out today and/or sent to [facility] for review.

## 2023-09-20 NOTE — TELEPHONE ENCOUNTER
----- Message from Venkat Brown MD sent at 9/20/2023 12:11 PM EDT -----  Regarding: CALL PATIENT REGARDING CHANGE IN MEDS  Call patient:    I have sent prescriptions for Ozempic 1 mg SC weekly and Tresiba insulin 30 units daily. Send blood sugars in to us in 2 weeks.

## 2023-09-20 NOTE — ASSESSMENT & PLAN NOTE
Lab Results   Component Value Date    HGBA1C 8.00 (H) 09/13/2023    HGBA1C 9.40 (H) 08/18/2023    HGBA1C 7.40 (H) 03/31/2023      Currently taking 55 units of Soliqua daily.   Will discontinue soliqua and start Ozempic/Tresiba.   Start Ozempic at 1 mg weekly and Tresiba 30 units daily. Will need to titrate Tresiba based on response.

## 2023-09-27 DIAGNOSIS — Z79.4 TYPE 2 DIABETES MELLITUS WITH HYPERGLYCEMIA, WITH LONG-TERM CURRENT USE OF INSULIN: Primary | ICD-10-CM

## 2023-09-27 DIAGNOSIS — E11.65 TYPE 2 DIABETES MELLITUS WITH HYPERGLYCEMIA, WITH LONG-TERM CURRENT USE OF INSULIN: Primary | ICD-10-CM

## 2023-09-27 NOTE — TELEPHONE ENCOUNTER
----- Message from Magui Dumont sent at 9/26/2023  7:52 PM EDT -----  Regarding: Medication  Contact: [phone]  That sounds perfect ! Thank you !

## 2023-12-09 DIAGNOSIS — Z79.4 TYPE 2 DIABETES MELLITUS WITH HYPERGLYCEMIA, WITH LONG-TERM CURRENT USE OF INSULIN: Chronic | ICD-10-CM

## 2023-12-09 DIAGNOSIS — E11.65 TYPE 2 DIABETES MELLITUS WITH HYPERGLYCEMIA, WITH LONG-TERM CURRENT USE OF INSULIN: Chronic | ICD-10-CM

## 2023-12-11 RX ORDER — INSULIN DEGLUDEC INJECTION 100 U/ML
INJECTION, SOLUTION SUBCUTANEOUS
Qty: 9 ML | Refills: 0 | Status: SHIPPED | OUTPATIENT
Start: 2023-12-11

## 2023-12-12 ENCOUNTER — PRE-ADMISSION TESTING (OUTPATIENT)
Dept: PREADMISSION TESTING | Facility: HOSPITAL | Age: 68
End: 2023-12-12
Payer: MEDICARE

## 2023-12-12 VITALS
WEIGHT: 162.6 LBS | SYSTOLIC BLOOD PRESSURE: 125 MMHG | RESPIRATION RATE: 14 BRPM | DIASTOLIC BLOOD PRESSURE: 78 MMHG | BODY MASS INDEX: 26.13 KG/M2 | HEIGHT: 66 IN | HEART RATE: 79 BPM | TEMPERATURE: 97.8 F | OXYGEN SATURATION: 98 %

## 2023-12-12 LAB
ANION GAP SERPL CALCULATED.3IONS-SCNC: 11.7 MMOL/L (ref 5–15)
BUN SERPL-MCNC: 9 MG/DL (ref 8–23)
BUN/CREAT SERPL: 14.8 (ref 7–25)
CALCIUM SPEC-SCNC: 9.5 MG/DL (ref 8.6–10.5)
CHLORIDE SERPL-SCNC: 99 MMOL/L (ref 98–107)
CO2 SERPL-SCNC: 27.3 MMOL/L (ref 22–29)
CREAT SERPL-MCNC: 0.61 MG/DL (ref 0.57–1)
DEPRECATED RDW RBC AUTO: 41.1 FL (ref 37–54)
EGFRCR SERPLBLD CKD-EPI 2021: 96.9 ML/MIN/1.73
ERYTHROCYTE [DISTWIDTH] IN BLOOD BY AUTOMATED COUNT: 13.1 % (ref 12.3–15.4)
GLUCOSE SERPL-MCNC: 167 MG/DL (ref 65–99)
HCT VFR BLD AUTO: 38.9 % (ref 34–46.6)
HGB BLD-MCNC: 13 G/DL (ref 12–15.9)
MCH RBC QN AUTO: 29 PG (ref 26.6–33)
MCHC RBC AUTO-ENTMCNC: 33.4 G/DL (ref 31.5–35.7)
MCV RBC AUTO: 86.6 FL (ref 79–97)
PLATELET # BLD AUTO: 165 10*3/MM3 (ref 140–450)
PMV BLD AUTO: 8.6 FL (ref 6–12)
POTASSIUM SERPL-SCNC: 3.7 MMOL/L (ref 3.5–5.2)
RBC # BLD AUTO: 4.49 10*6/MM3 (ref 3.77–5.28)
SODIUM SERPL-SCNC: 138 MMOL/L (ref 136–145)
WBC NRBC COR # BLD AUTO: 7.37 10*3/MM3 (ref 3.4–10.8)

## 2023-12-12 PROCEDURE — 85027 COMPLETE CBC AUTOMATED: CPT

## 2023-12-12 PROCEDURE — 80048 BASIC METABOLIC PNL TOTAL CA: CPT

## 2023-12-12 PROCEDURE — 36415 COLL VENOUS BLD VENIPUNCTURE: CPT

## 2023-12-12 NOTE — DISCHARGE INSTRUCTIONS
Take the following medications the morning of surgery: CELEXA, LEVOTHYROXINE, OMEPRAZOLE    TIME OF ARRIVAL: 5:30AM    If you are on prescription narcotic pain medication to control your pain you may also take that medication the morning of surgery.    General Instructions:  Do not eat solid food after midnight the night before surgery.  You may drink clear liquids day of surgery but must stop at least one hour before your hospital arrival time.  It is beneficial for you to have a clear drink that contains carbohydrates the day of surgery.  We suggest a 12 to 20 ounce bottle of Gatorade or Powerade for non-diabetic patients or a 12 to 20 ounce bottle of G2 or Powerade Zero for diabetic patients. (Pediatric patients, are not advised to drink a 12 to 20 ounce carbohydrate drink)    Clear liquids are liquids you can see through.  Nothing red in color.     Plain water                               Sports drinks  Sodas                                   Gelatin (Jell-O)  Fruit juices without pulp such as white grape juice and apple juice  Popsicles that contain no fruit or yogurt  Tea or coffee (no cream or milk added)  Gatorade / Powerade  G2 / Powerade Zero      Patients who avoid smoking, chewing tobacco and alcohol for 4 weeks prior to surgery have a reduced risk of post-operative complications.  Quit smoking as many days before surgery as you can.  Do not smoke, use chewing tobacco or drink alcohol the day of surgery.   If applicable bring your C-PAP/ BI-PAP machine in with you to preop day of surgery.  Bring any papers given to you in the doctor’s office.  Wear clean comfortable clothes.  Do not wear contact lenses, false eyelashes or make-up.  Bring a case for your glasses.   Bring crutches or walker if applicable.  Remove all piercings.  Leave jewelry and any other valuables at home.  Hair extensions with metal clips must be removed prior to surgery.  The Pre-Admission Testing nurse will instruct you to bring  medications if unable to obtain an accurate list in Pre-Admission Testing.        Preventing a Surgical Site Infection:  For 2 to 3 days before surgery, avoid shaving with a razor because the razor can irritate skin and make it easier to develop an infection.    Any areas of open skin can increase the risk of a post-operative wound infection by allowing bacteria to enter and travel throughout the body.  Notify your surgeon if you have any skin wounds / rashes even if it is not near the expected surgical site.  The area will need assessed to determine if surgery should be delayed until it is healed.  The night prior to surgery shower using a fresh bar of anti-bacterial soap (such as Dial) and clean washcloth.  Sleep in a clean bed with clean clothing.  Do not allow pets to sleep with you.  Shower on the morning of surgery using a fresh bar of anti-bacterial soap (such as Dial) and clean washcloth.  Dry with a clean towel and dress in clean clothing.  Ask your surgeon if you will be receiving antibiotics prior to surgery.  Make sure you, your family, and all healthcare providers clean their hands with soap and water or an alcohol based hand  before caring for you or your wound.  CHLORHEXIDINE CLOTH INSTRUCTIONS  The morning of surgery follow these instructions using the Chlorhexidine cloths you've been given.  These steps reduce bacteria on the body.  Do not use the cloths near your eyes, ears mouth, genitalia or on open wounds.  Throw the cloths away after use but do not try to flush them down a toilet.      Open and remove one cloth at a time from the package.    Leave the cloth unfolded and begin the bathing.  Massage the skin with the cloths using gentle pressure to remove bacteria.  Do not scrub harshly.   Follow the steps below with one 2% CHG cloth per area (6 total cloths).  One cloth for neck, shoulders and chest.  One cloth for both arms, hands, fingers and underarms (do underarms last).  One cloth  for the abdomen followed by groin.  One cloth for right leg and foot including between the toes.  One cloth for left leg and foot including between the toes.  The last cloth is to be used for the back of the neck, back and buttocks.    Allow the CHG to air dry 3 minutes on the skin which will give it time to work and decrease the chance of irritation.  The skin may feel sticky until it is dry.  Do not rinse with water or any other liquid or you will lose the beneficial effects of the CHG.  If mild skin irritation occurs, do rinse the skin to remove the CHG.  Report this to the nurse at time of admission.  Do not apply lotions, creams, ointments, deodorants or perfumes after using the clothes. Dress in clean clothes before coming to the hospital.    Day of surgery:  Your arrival time is approximately two hours before your scheduled surgery time.  Upon arrival, a Pre-op nurse and Anesthesiologist will review your health history, obtain vital signs, and answer questions you may have.  The only belongings needed at this time will be a list of your home medications and if applicable your C-PAP/BI-PAP machine.  A Pre-op nurse will start an IV and you may receive medication in preparation for surgery, including something to help you relax.     Please be aware that surgery does come with discomfort.  We want to make every effort to control your discomfort so please discuss any uncontrolled symptoms with your nurse.   Your doctor will most likely have prescribed pain medications.      If you are going home after surgery you will receive individualized written care instructions before being discharged.  A responsible adult must drive you to and from the hospital on the day of your surgery and stay with you for 24 hours.  Discharge prescriptions can be filled by the hospital pharmacy during regular pharmacy hours.  If you are having surgery late in the day/evening your prescription may be e-prescribed to your pharmacy.  Please  verify your pharmacy hours or chose a 24 hour pharmacy to avoid not having access to your prescription because your pharmacy has closed for the day.    If you are staying overnight following surgery, you will be transported to your hospital room following the recovery period.  [facility] has all private rooms.    If you have any questions please call Pre-Admission Testing at [phone].  Deductibles and co-payments are collected on the day of service. Please be prepared to pay the required co-pay, deductible or deposit on the day of service as defined by your plan.    Call your surgeon immediately if you experience any of the following symptoms:  Sore Throat  Shortness of Breath or difficulty breathing  Cough  Chills  Body soreness or muscle pain  Headache  Fever  New loss of taste or smell  Do not arrive for your surgery ill.  Your procedure will need to be rescheduled to another time.  You will need to call your physician before the day of surgery to avoid any unnecessary exposure to hospital staff as well as other patients.

## 2023-12-18 PROBLEM — N89.8 VAGINAL SCAR: Status: ACTIVE | Noted: 2023-12-18

## 2023-12-18 PROBLEM — N90.89 LABIAL ADHESIONS: Status: ACTIVE | Noted: 2023-12-18

## 2023-12-19 ENCOUNTER — HOSPITAL ENCOUNTER (OUTPATIENT)
Facility: HOSPITAL | Age: 68
Setting detail: HOSPITAL OUTPATIENT SURGERY
Discharge: HOME | End: 2023-12-19
Attending: OBSTETRICS & GYNECOLOGY | Admitting: OBSTETRICS & GYNECOLOGY
Payer: MEDICARE

## 2023-12-19 ENCOUNTER — ANESTHESIA EVENT (OUTPATIENT)
Dept: PERIOP | Facility: HOSPITAL | Age: 68
End: 2023-12-19
Payer: MEDICARE

## 2023-12-19 ENCOUNTER — ANESTHESIA (OUTPATIENT)
Dept: PERIOP | Facility: HOSPITAL | Age: 68
End: 2023-12-19
Payer: MEDICARE

## 2023-12-19 VITALS
OXYGEN SATURATION: 97 % | RESPIRATION RATE: 16 BRPM | TEMPERATURE: 98.2 F | SYSTOLIC BLOOD PRESSURE: 125 MMHG | HEART RATE: 96 BPM | DIASTOLIC BLOOD PRESSURE: 75 MMHG

## 2023-12-19 LAB
GLUCOSE BLDC GLUCOMTR-MCNC: 228 MG/DL (ref 70–130)
GLUCOSE BLDC GLUCOMTR-MCNC: 275 MG/DL (ref 70–130)

## 2023-12-19 PROCEDURE — 25010000002 MIDAZOLAM PER 1 MG: Performed by: ANESTHESIOLOGY

## 2023-12-19 PROCEDURE — 25010000002 SUGAMMADEX 200 MG/2ML SOLUTION: Performed by: NURSE ANESTHETIST, CERTIFIED REGISTERED

## 2023-12-19 PROCEDURE — 25010000002 FENTANYL CITRATE (PF) 50 MCG/ML SOLUTION: Performed by: NURSE ANESTHETIST, CERTIFIED REGISTERED

## 2023-12-19 PROCEDURE — 25810000003 LACTATED RINGERS PER 1000 ML: Performed by: ANESTHESIOLOGY

## 2023-12-19 PROCEDURE — 25010000002 CEFAZOLIN IN DEXTROSE 2-4 GM/100ML-% SOLUTION: Performed by: OBSTETRICS & GYNECOLOGY

## 2023-12-19 PROCEDURE — 25010000002 ONDANSETRON PER 1 MG: Performed by: NURSE ANESTHETIST, CERTIFIED REGISTERED

## 2023-12-19 PROCEDURE — 82948 REAGENT STRIP/BLOOD GLUCOSE: CPT

## 2023-12-19 PROCEDURE — 25810000003 LACTATED RINGERS PER 1000 ML: Performed by: NURSE ANESTHETIST, CERTIFIED REGISTERED

## 2023-12-19 PROCEDURE — 25010000002 LABETALOL 5 MG/ML SOLUTION: Performed by: NURSE ANESTHETIST, CERTIFIED REGISTERED

## 2023-12-19 PROCEDURE — 25010000002 PROPOFOL 200 MG/20ML EMULSION: Performed by: NURSE ANESTHETIST, CERTIFIED REGISTERED

## 2023-12-19 PROCEDURE — 25010000002 DEXAMETHASONE SODIUM PHOSPHATE 20 MG/5ML SOLUTION: Performed by: NURSE ANESTHETIST, CERTIFIED REGISTERED

## 2023-12-19 DEVICE — FLOSEAL WITH RECOTHROM - 10ML.
Type: IMPLANTABLE DEVICE | Site: VAGINA | Status: FUNCTIONAL
Brand: FLOSEAL HEMOSTATIC MATRIX

## 2023-12-19 RX ORDER — MIDAZOLAM HYDROCHLORIDE 1 MG/ML
0.5 INJECTION INTRAMUSCULAR; INTRAVENOUS
Status: DISCONTINUED | OUTPATIENT
Start: 2023-12-19 | End: 2023-12-19 | Stop reason: HOSPADM

## 2023-12-19 RX ORDER — SODIUM CHLORIDE, SODIUM LACTATE, POTASSIUM CHLORIDE, CALCIUM CHLORIDE 600; 310; 30; 20 MG/100ML; MG/100ML; MG/100ML; MG/100ML
INJECTION, SOLUTION INTRAVENOUS CONTINUOUS PRN
Status: DISCONTINUED | OUTPATIENT
Start: 2023-12-19 | End: 2023-12-19 | Stop reason: SURG

## 2023-12-19 RX ORDER — SODIUM CHLORIDE 0.9 % (FLUSH) 0.9 %
10 SYRINGE (ML) INJECTION EVERY 12 HOURS SCHEDULED
Status: DISCONTINUED | OUTPATIENT
Start: 2023-12-19 | End: 2023-12-19 | Stop reason: HOSPADM

## 2023-12-19 RX ORDER — HYDROMORPHONE HYDROCHLORIDE 1 MG/ML
0.25 INJECTION, SOLUTION INTRAMUSCULAR; INTRAVENOUS; SUBCUTANEOUS
Status: DISCONTINUED | OUTPATIENT
Start: 2023-12-19 | End: 2023-12-19 | Stop reason: HOSPADM

## 2023-12-19 RX ORDER — LIDOCAINE HYDROCHLORIDE 20 MG/ML
INJECTION, SOLUTION INFILTRATION; PERINEURAL AS NEEDED
Status: DISCONTINUED | OUTPATIENT
Start: 2023-12-19 | End: 2023-12-19 | Stop reason: SURG

## 2023-12-19 RX ORDER — MAGNESIUM HYDROXIDE 1200 MG/15ML
LIQUID ORAL AS NEEDED
Status: DISCONTINUED | OUTPATIENT
Start: 2023-12-19 | End: 2023-12-19 | Stop reason: HOSPADM

## 2023-12-19 RX ORDER — HYDRALAZINE HYDROCHLORIDE 20 MG/ML
5 INJECTION INTRAMUSCULAR; INTRAVENOUS
Status: DISCONTINUED | OUTPATIENT
Start: 2023-12-19 | End: 2023-12-19 | Stop reason: HOSPADM

## 2023-12-19 RX ORDER — FAMOTIDINE 10 MG/ML
20 INJECTION, SOLUTION INTRAVENOUS ONCE
Status: COMPLETED | OUTPATIENT
Start: 2023-12-19 | End: 2023-12-19

## 2023-12-19 RX ORDER — ESTRADIOL 0.1 MG/G
CREAM VAGINAL AS NEEDED
Status: DISCONTINUED | OUTPATIENT
Start: 2023-12-19 | End: 2023-12-19 | Stop reason: HOSPADM

## 2023-12-19 RX ORDER — SODIUM CHLORIDE 9 MG/ML
40 INJECTION, SOLUTION INTRAVENOUS AS NEEDED
Status: DISCONTINUED | OUTPATIENT
Start: 2023-12-19 | End: 2023-12-19 | Stop reason: HOSPADM

## 2023-12-19 RX ORDER — DROPERIDOL 2.5 MG/ML
0.62 INJECTION, SOLUTION INTRAMUSCULAR; INTRAVENOUS
Status: DISCONTINUED | OUTPATIENT
Start: 2023-12-19 | End: 2023-12-19 | Stop reason: HOSPADM

## 2023-12-19 RX ORDER — SODIUM CHLORIDE 0.9 % (FLUSH) 0.9 %
10 SYRINGE (ML) INJECTION AS NEEDED
Status: DISCONTINUED | OUTPATIENT
Start: 2023-12-19 | End: 2023-12-19 | Stop reason: HOSPADM

## 2023-12-19 RX ORDER — LIDOCAINE HYDROCHLORIDE 10 MG/ML
0.5 INJECTION, SOLUTION INFILTRATION; PERINEURAL ONCE AS NEEDED
Status: DISCONTINUED | OUTPATIENT
Start: 2023-12-19 | End: 2023-12-19 | Stop reason: HOSPADM

## 2023-12-19 RX ORDER — BUPIVACAINE HYDROCHLORIDE AND EPINEPHRINE 2.5; 5 MG/ML; UG/ML
INJECTION, SOLUTION INFILTRATION; PERINEURAL AS NEEDED
Status: DISCONTINUED | OUTPATIENT
Start: 2023-12-19 | End: 2023-12-19 | Stop reason: HOSPADM

## 2023-12-19 RX ORDER — EPHEDRINE SULFATE 50 MG/ML
5 INJECTION, SOLUTION INTRAVENOUS ONCE AS NEEDED
Status: DISCONTINUED | OUTPATIENT
Start: 2023-12-19 | End: 2023-12-19 | Stop reason: HOSPADM

## 2023-12-19 RX ORDER — IPRATROPIUM BROMIDE AND ALBUTEROL SULFATE 2.5; .5 MG/3ML; MG/3ML
3 SOLUTION RESPIRATORY (INHALATION) ONCE AS NEEDED
Status: DISCONTINUED | OUTPATIENT
Start: 2023-12-19 | End: 2023-12-19 | Stop reason: HOSPADM

## 2023-12-19 RX ORDER — NALOXONE HCL 0.4 MG/ML
0.2 VIAL (ML) INJECTION AS NEEDED
Status: DISCONTINUED | OUTPATIENT
Start: 2023-12-19 | End: 2023-12-19 | Stop reason: HOSPADM

## 2023-12-19 RX ORDER — PROMETHAZINE HYDROCHLORIDE 25 MG/1
25 TABLET ORAL ONCE AS NEEDED
Status: DISCONTINUED | OUTPATIENT
Start: 2023-12-19 | End: 2023-12-19 | Stop reason: HOSPADM

## 2023-12-19 RX ORDER — PROPOFOL 10 MG/ML
INJECTION, EMULSION INTRAVENOUS AS NEEDED
Status: DISCONTINUED | OUTPATIENT
Start: 2023-12-19 | End: 2023-12-19 | Stop reason: SURG

## 2023-12-19 RX ORDER — PROMETHAZINE HYDROCHLORIDE 25 MG/1
25 SUPPOSITORY RECTAL ONCE AS NEEDED
Status: DISCONTINUED | OUTPATIENT
Start: 2023-12-19 | End: 2023-12-19 | Stop reason: HOSPADM

## 2023-12-19 RX ORDER — DIPHENHYDRAMINE HYDROCHLORIDE 50 MG/ML
12.5 INJECTION INTRAMUSCULAR; INTRAVENOUS
Status: DISCONTINUED | OUTPATIENT
Start: 2023-12-19 | End: 2023-12-19 | Stop reason: HOSPADM

## 2023-12-19 RX ORDER — LABETALOL HYDROCHLORIDE 5 MG/ML
5 INJECTION, SOLUTION INTRAVENOUS
Status: DISCONTINUED | OUTPATIENT
Start: 2023-12-19 | End: 2023-12-19 | Stop reason: HOSPADM

## 2023-12-19 RX ORDER — FLUMAZENIL 0.1 MG/ML
0.2 INJECTION INTRAVENOUS AS NEEDED
Status: DISCONTINUED | OUTPATIENT
Start: 2023-12-19 | End: 2023-12-19 | Stop reason: HOSPADM

## 2023-12-19 RX ORDER — DEXAMETHASONE SODIUM PHOSPHATE 4 MG/ML
INJECTION, SOLUTION INTRA-ARTICULAR; INTRALESIONAL; INTRAMUSCULAR; INTRAVENOUS; SOFT TISSUE AS NEEDED
Status: DISCONTINUED | OUTPATIENT
Start: 2023-12-19 | End: 2023-12-19 | Stop reason: SURG

## 2023-12-19 RX ORDER — PHENAZOPYRIDINE HYDROCHLORIDE 200 MG/1
200 TABLET, FILM COATED ORAL ONCE
Status: COMPLETED | OUTPATIENT
Start: 2023-12-19 | End: 2023-12-19

## 2023-12-19 RX ORDER — HYDROCODONE BITARTRATE AND ACETAMINOPHEN 5; 325 MG/1; MG/1
1 TABLET ORAL ONCE AS NEEDED
Status: DISCONTINUED | OUTPATIENT
Start: 2023-12-19 | End: 2023-12-19 | Stop reason: HOSPADM

## 2023-12-19 RX ORDER — CEFAZOLIN SODIUM 2 G/100ML
2000 INJECTION, SOLUTION INTRAVENOUS ONCE
Status: COMPLETED | OUTPATIENT
Start: 2023-12-19 | End: 2023-12-19

## 2023-12-19 RX ORDER — FENTANYL CITRATE 50 UG/ML
50 INJECTION, SOLUTION INTRAMUSCULAR; INTRAVENOUS ONCE AS NEEDED
Status: DISCONTINUED | OUTPATIENT
Start: 2023-12-19 | End: 2023-12-19 | Stop reason: HOSPADM

## 2023-12-19 RX ORDER — LABETALOL HYDROCHLORIDE 5 MG/ML
INJECTION, SOLUTION INTRAVENOUS AS NEEDED
Status: DISCONTINUED | OUTPATIENT
Start: 2023-12-19 | End: 2023-12-19 | Stop reason: SURG

## 2023-12-19 RX ORDER — CLOBETASOL PROPIONATE 0.5 MG/G
OINTMENT TOPICAL AS NEEDED
Status: DISCONTINUED | OUTPATIENT
Start: 2023-12-19 | End: 2023-12-19 | Stop reason: HOSPADM

## 2023-12-19 RX ORDER — ONDANSETRON 2 MG/ML
INJECTION INTRAMUSCULAR; INTRAVENOUS AS NEEDED
Status: DISCONTINUED | OUTPATIENT
Start: 2023-12-19 | End: 2023-12-19 | Stop reason: SURG

## 2023-12-19 RX ORDER — SODIUM CHLORIDE 0.9 % (FLUSH) 0.9 %
3-10 SYRINGE (ML) INJECTION AS NEEDED
Status: DISCONTINUED | OUTPATIENT
Start: 2023-12-19 | End: 2023-12-19 | Stop reason: HOSPADM

## 2023-12-19 RX ORDER — SODIUM CHLORIDE, SODIUM LACTATE, POTASSIUM CHLORIDE, CALCIUM CHLORIDE 600; 310; 30; 20 MG/100ML; MG/100ML; MG/100ML; MG/100ML
9 INJECTION, SOLUTION INTRAVENOUS CONTINUOUS
Status: DISCONTINUED | OUTPATIENT
Start: 2023-12-19 | End: 2023-12-19 | Stop reason: HOSPADM

## 2023-12-19 RX ORDER — GLYCOPYRROLATE 0.2 MG/ML
INJECTION INTRAMUSCULAR; INTRAVENOUS AS NEEDED
Status: DISCONTINUED | OUTPATIENT
Start: 2023-12-19 | End: 2023-12-19 | Stop reason: SURG

## 2023-12-19 RX ORDER — FENTANYL CITRATE 50 UG/ML
INJECTION, SOLUTION INTRAMUSCULAR; INTRAVENOUS AS NEEDED
Status: DISCONTINUED | OUTPATIENT
Start: 2023-12-19 | End: 2023-12-19 | Stop reason: SURG

## 2023-12-19 RX ORDER — HYDROCODONE BITARTRATE AND ACETAMINOPHEN 7.5; 325 MG/1; MG/1
1 TABLET ORAL EVERY 4 HOURS PRN
Status: DISCONTINUED | OUTPATIENT
Start: 2023-12-19 | End: 2023-12-19 | Stop reason: HOSPADM

## 2023-12-19 RX ORDER — SODIUM CHLORIDE 0.9 % (FLUSH) 0.9 %
3 SYRINGE (ML) INJECTION EVERY 12 HOURS SCHEDULED
Status: DISCONTINUED | OUTPATIENT
Start: 2023-12-19 | End: 2023-12-19 | Stop reason: HOSPADM

## 2023-12-19 RX ORDER — ONDANSETRON 2 MG/ML
4 INJECTION INTRAMUSCULAR; INTRAVENOUS ONCE AS NEEDED
Status: DISCONTINUED | OUTPATIENT
Start: 2023-12-19 | End: 2023-12-19 | Stop reason: HOSPADM

## 2023-12-19 RX ORDER — FENTANYL CITRATE 50 UG/ML
25 INJECTION, SOLUTION INTRAMUSCULAR; INTRAVENOUS
Status: DISCONTINUED | OUTPATIENT
Start: 2023-12-19 | End: 2023-12-19 | Stop reason: HOSPADM

## 2023-12-19 RX ORDER — ROCURONIUM BROMIDE 10 MG/ML
INJECTION, SOLUTION INTRAVENOUS AS NEEDED
Status: DISCONTINUED | OUTPATIENT
Start: 2023-12-19 | End: 2023-12-19 | Stop reason: SURG

## 2023-12-19 RX ADMIN — FENTANYL CITRATE 50 MCG: 50 INJECTION, SOLUTION INTRAMUSCULAR; INTRAVENOUS at 10:38

## 2023-12-19 RX ADMIN — FENTANYL CITRATE 25 MCG: 50 INJECTION, SOLUTION INTRAMUSCULAR; INTRAVENOUS at 07:37

## 2023-12-19 RX ADMIN — SUGAMMADEX 200 MG: 100 INJECTION, SOLUTION INTRAVENOUS at 10:38

## 2023-12-19 RX ADMIN — DEXAMETHASONE SODIUM PHOSPHATE 4 MG: 4 INJECTION, SOLUTION INTRAMUSCULAR; INTRAVENOUS at 07:46

## 2023-12-19 RX ADMIN — FENTANYL CITRATE 25 MCG: 50 INJECTION, SOLUTION INTRAMUSCULAR; INTRAVENOUS at 09:41

## 2023-12-19 RX ADMIN — ROCURONIUM BROMIDE 50 MG: 10 INJECTION, SOLUTION INTRAVENOUS at 07:37

## 2023-12-19 RX ADMIN — ROCURONIUM BROMIDE 20 MG: 10 INJECTION, SOLUTION INTRAVENOUS at 08:58

## 2023-12-19 RX ADMIN — GLYCOPYRROLATE 0.1 MG: 0.2 INJECTION INTRAMUSCULAR; INTRAVENOUS at 07:46

## 2023-12-19 RX ADMIN — FENTANYL CITRATE 25 MCG: 50 INJECTION, SOLUTION INTRAMUSCULAR; INTRAVENOUS at 08:27

## 2023-12-19 RX ADMIN — FENTANYL CITRATE 25 MCG: 50 INJECTION, SOLUTION INTRAMUSCULAR; INTRAVENOUS at 08:26

## 2023-12-19 RX ADMIN — ONDANSETRON 4 MG: 2 INJECTION INTRAMUSCULAR; INTRAVENOUS at 10:38

## 2023-12-19 RX ADMIN — FAMOTIDINE 20 MG: 10 INJECTION INTRAVENOUS at 07:02

## 2023-12-19 RX ADMIN — ROCURONIUM BROMIDE 10 MG: 10 INJECTION, SOLUTION INTRAVENOUS at 09:34

## 2023-12-19 RX ADMIN — MIDAZOLAM HYDROCHLORIDE 0.5 MG: 1 INJECTION, SOLUTION INTRAMUSCULAR; INTRAVENOUS at 07:02

## 2023-12-19 RX ADMIN — FENTANYL CITRATE 25 MCG: 50 INJECTION, SOLUTION INTRAMUSCULAR; INTRAVENOUS at 08:58

## 2023-12-19 RX ADMIN — FENTANYL CITRATE 25 MCG: 50 INJECTION, SOLUTION INTRAMUSCULAR; INTRAVENOUS at 09:24

## 2023-12-19 RX ADMIN — CEFAZOLIN SODIUM 2000 MG: 2 INJECTION, SOLUTION INTRAVENOUS at 07:23

## 2023-12-19 RX ADMIN — LIDOCAINE HYDROCHLORIDE 100 MG: 20 INJECTION, SOLUTION INFILTRATION; PERINEURAL at 07:37

## 2023-12-19 RX ADMIN — LABETALOL HYDROCHLORIDE 5 MG: 5 INJECTION, SOLUTION INTRAVENOUS at 09:46

## 2023-12-19 RX ADMIN — PROPOFOL 120 MG: 10 INJECTION, EMULSION INTRAVENOUS at 07:37

## 2023-12-19 RX ADMIN — SODIUM CHLORIDE, SODIUM LACTATE, POTASSIUM CHLORIDE, AND CALCIUM CHLORIDE: 600; 310; 30; 20 INJECTION, SOLUTION INTRAVENOUS at 07:27

## 2023-12-19 RX ADMIN — SODIUM CHLORIDE, POTASSIUM CHLORIDE, SODIUM LACTATE AND CALCIUM CHLORIDE 9 ML/HR: 600; 310; 30; 20 INJECTION, SOLUTION INTRAVENOUS at 07:01

## 2023-12-19 RX ADMIN — PHENAZOPYRIDINE 200 MG: 200 TABLET ORAL at 07:01

## 2023-12-19 NOTE — ADDENDUM NOTE
Addendum  created 12/19/23 1554 by Amina Patel MD    Attestation recorded in Intraprocedure, Intraprocedure Attestations filed

## 2023-12-19 NOTE — NURSING NOTE
Pt given discharge instructions for post anesthesia and 1 medication used in surgery.  Pt has discharge instructions at home that Dr. Ribeiro gave her in Dr. Ribeiro's office.

## 2023-12-19 NOTE — H&P
Female Pelvic Medicine and Reconstructive Surgery   History & Physical    Patient Identification:  Name: Magui Dumont Age: 68 y.o. Sex: female :  1955 MRN: Female Pelvic Medicine and Reconstructive Surgery   History & Physical    Patient Identification:  Name: Magui Dumont Age: 68 y.o. Sex: female :  1955 MRN: [id number]                            Problem List:    Labial adhesions    Vaginal scar    Past Medical History:  Past Medical History:   Diagnosis Date    Depression     History of COVID-19     Hyperlipidemia     Hypertension     Hypothyroidism     Incontinence     HISTORY    PONV (postoperative nausea and vomiting)     RBBB     Seasonal allergic rhinitis     Type 2 diabetes mellitus     Uterovaginal prolapse     CYTOCELE, VAGINAL ENTEROCELE AND RECTOCELE    Vaginal adhesions     Vitamin D deficiency      Past Surgical History:  Past Surgical History:   Procedure Laterality Date    CHOLECYSTECTOMY      COLONOSCOPY N/A 9/15/2017    Procedure: COLONOSCOPY;  Surgeon: Brian Puckett MD;  Location: [facility] ENDOSCOPY;  Service:     D & C HYSTEROSCOPY N/A 2023    Procedure: HYSTEROSCOPY with fractional DILATATION AND CURETTAGE pap smear cystourethroscopy;  Surgeon: Magui Ribeiro MD;  Location: [facility];  Service: Gynecology;  Laterality: N/A;    EYE SURGERY Left     cataract     TONSILLECTOMY      TOTAL LAPAROSCOPIC HYSTERECTOMY, SACROCOLPOPEXY N/A 2023    Procedure: Laparoscopic uterosacral ligament colpopexy sacral colpopexy Laparoscopic paravaginal repair Laparoscopic hysterectomy with bilateral salpingectomy Laparoscopic abdominal enterocele repair Pubovaginal sling Posterior colporrhaphy  Insertion of vaginal grafts Cystourethroscopy;  Surgeon: Magui Ribeiro MD;  Location: [facility] OR;  Service: Gynecology;  Laterality: N/A;      Home Meds:  Medications Prior to Admission   Medication Sig Dispense Refill Last Dose    citalopram (CeleXA) 20 MG tablet Take 1  tablet by mouth once daily (Patient taking differently: Take 1 tablet by mouth Daily.) 90 tablet 1 12/19/2023 at 0430    insulin degludec (Tresiba FlexTouch) 100 UNIT/ML solution pen-injector injection INJECT 30 UNITS SUBCUTANEOUSLY ONCE DAILY AS DIRECTED (Patient taking differently: Inject 30 Units under the skin into the appropriate area as directed Daily.) 9 mL 0 12/18/2023    levothyroxine (SYNTHROID, LEVOTHROID) 100 MCG tablet Take 1 tablet by mouth once daily (Patient taking differently: Take 1 tablet by mouth Daily.) 90 tablet 1 12/19/2023 at 0430    lisinopril-hydrochlorothiazide (PRINZIDE,ZESTORETIC) 20-12.5 MG per tablet Take 1 tablet by mouth Daily.   12/19/2023 at 0430    metFORMIN (GLUCOPHAGE) 1000 MG tablet TAKE 1 TABLET BY MOUTH TWICE DAILY WITH MEALS (Patient taking differently: Take 1 tablet by mouth 2 (Two) Times a Day With Meals.) 180 tablet 3 12/18/2023    omeprazole (priLOSEC) 20 MG capsule Take 1 capsule by mouth Daily.   12/19/2023 at 0430    rosuvastatin (CRESTOR) 20 MG tablet Take 1 tablet by mouth Every Night. 90 tablet 3 12/18/2023    SITagliptin (Januvia) 100 MG tablet Take 1 tablet by mouth Every Morning. 30 tablet 3 12/18/2023 at 0800    glucose blood test strip OneTouch Verio In Vitro Strip; Patient Sig: CHECK BLOOD GLUCOSE 1 TIME A DAY; E11.65 100 each 3     Insulin Pen Needle (ReliOn Pen Needles) 32G X 4 MM misc 1 each by Other route Daily. 100 each 3     nystatin 571671 UNIT/GM powder APPLY  POWDER TOPICALLY TO AFFECTED AREA AS DIRECTED TWICE DAILY (Patient taking differently: Apply 1 application  topically to the appropriate area as directed 2 (Two) Times a Day As Needed (TO RED AREA  AS NEEDED).) 60 g 2 Unknown    Omega-3 Fatty Acids (FISH OIL) 1000 MG capsule delayed-release Take 1 capsule by mouth Daily. HOLD ONE WEEK FOR SURGERY   12/12/2023    ONETOUCH DELICA LANCETS FINE misc        vitamin D (ERGOCALCIFEROL) 1.25 MG (33233 UT) capsule capsule Take 1 capsule by mouth 1 (One)  Time Per Week. 4 capsule 3 12/15/2023     Current Meds:     Current Facility-Administered Medications:     ceFAZolin in dextrose (ANCEF) IVPB solution 2,000 mg, 2,000 mg, Intravenous, Once, Magui Ribeiro MD    fentaNYL citrate (PF) (SUBLIMAZE) injection 50 mcg, 50 mcg, Intravenous, Once PRN, Amina Patel MD    lactated ringers infusion, 9 mL/hr, Intravenous, Continuous, Amina Patel MD, Last Rate: 9 mL/hr at 23 0701, 9 mL/hr at 23 0701    lidocaine (XYLOCAINE) 1 % injection 0.5 mL, 0.5 mL, Intradermal, Once PRN, Amina Patel MD    midazolam (VERSED) injection 0.5 mg, 0.5 mg, Intravenous, Q5 Min PRN, Amina Patel MD, 0.5 mg at 23 0702    sodium chloride 0.9 % flush 10 mL, 10 mL, Intravenous, Q12H, Magui Ribeiro MD    sodium chloride 0.9 % flush 10 mL, 10 mL, Intravenous, PRN, Magui Ribeiro MD    sodium chloride 0.9 % flush 3 mL, 3 mL, Intravenous, Q12H, Amina Patel MD    sodium chloride 0.9 % flush 3-10 mL, 3-10 mL, Intravenous, PRN, Amina Patel MD    sodium chloride 0.9 % infusion 40 mL, 40 mL, Intravenous, PRCHANDNI, Magui Ribeiro MD  Allergies:  No Known Allergies  Immunizations:  Immunization History   Administered Date(s) Administered    COVID-19 (PFIZER) Purple Cap Monovalent 2021, 2021, 2021    Flu Vaccine Quad PF 6-35MO 2018    Fluad Quad 65+ 10/15/2022    Fluzone High Dose =>65 Years (Vaxcare ONLY) 10/02/2021    PPD Test 2017    Pneumococcal Polysaccharide (PPSV23) 2019    Tdap 2017     Social History:   Social History     Tobacco Use    Smoking status: Never    Smokeless tobacco: Never   Substance Use Topics    Alcohol use: No      Family History:  Family History   Problem Relation Age of Onset    Lung cancer Mother         smoker    Diabetes type II Father          at 82    Coronary artery disease Father     No Known Problems Sister     No Known Problems Sister     Cancer Brother         cholangiocarcinoma ( 54)     Diabetes type II Brother     Lung cancer Maternal Aunt         Adenocarcinoma (6 sisters)    Colon cancer Neg Hx     Malig Hyperthermia Neg Hx         Review of Systems  Constitutional:  Denies fever or chills   Eyes:  Denies change in visual acuity   HEENT:  Denies nasal congestion or sore throat   Respiratory:  Denies cough or shortness of breath   Cardiovascular:  Denies chest pain or edema   GI:  Denies abdominal pain, nausea, vomiting, bloody stools or diarrhea   :  Denies dysuria   Musculoskeletal:  Denies back pain or joint pain   Integument:  Denies rash   Neurologic:  Denies headache, focal weakness or sensory changes   Endocrine:  Denies polyuria or polydipsia   Lymphatic:  Denies swollen glands   Psychiatric:  Denies depression or anxiety       Objective:  tMax 24 hrs: Temp (24hrs), Av.5 °F (36.9 °C), Min:98.5 °F (36.9 °C), Max:98.5 °F (36.9 °C)    Vitals Ranges:   Temp:  [98.5 °F (36.9 °C)] 98.5 °F (36.9 °C)  Heart Rate:  [103] 103  Resp:  [18] 18  BP: (128)/(77) 128/77    Exam:  Vital Signs: /77 (BP Location: Right arm, Patient Position: Lying)   Pulse 103   Temp 98.5 °F (36.9 °C) (Oral)   Resp 18   SpO2 94%   Constitutional:  Well developed, well nourished, no acute distress, non-toxic appearance    GI:  Soft, nondistended, normal bowel sounds, nontender, no organomegaly, no mass, no rebound, no guarding   :  No costovertebral angle tenderness   Musculoskeletal:  No edema, no tenderness, no deformities. Back- no tenderness  Integument:  Well hydrated, no rash   Lymphatic:  No lymphadenopathy noted   Neurologic:  Alert & oriented x 3,  Pelvic:     Assessment:    Labial adhesions    Vaginal scar     Plan:  LYSIS OF LABIAL ADHESIONS  REMOVAL OF VAGINAL SCAR BAND  CYSTOURETHROSCOPY    Magui Ribeiro MD  2023

## 2023-12-19 NOTE — ANESTHESIA PROCEDURE NOTES
Airway  Urgency: elective    Date/Time: 12/19/2023 7:41 AM  Airway not difficult    General Information and Staff    Patient location during procedure: OR    Indications and Patient Condition  Indications for airway management: airway protection    Preoxygenated: yes  Mask difficulty assessment: 2 - vent by mask + OA or adjuvant +/- NMBA    Final Airway Details  Final airway type: endotracheal airway      Successful airway: ETT  Cuffed: yes   Successful intubation technique: direct laryngoscopy and video laryngoscopy  Facilitating devices/methods: intubating stylet  Endotracheal tube insertion site: oral  Blade: CMAC  Blade size: D  ETT size (mm): 7.0  Cormack-Lehane Classification: grade IIb - view of arytenoids or posterior of glottis only  Placement verified by: chest auscultation and capnometry   Measured from: lips  ETT/EBT  to lips (cm): 21  Number of attempts at approach: 1  Assessment: lips, teeth, and gum same as pre-op and atraumatic intubation    Additional Comments  Recommend [facility]

## 2023-12-19 NOTE — H&P
Female Pelvic Medicine and Reconstructive Surgery   History & Physical    Patient Identification:  Name: Magui Dumont Age: 68 y.o. Sex: female :  1955 MRN: Female Pelvic Medicine and Reconstructive Surgery   History & Physical    Patient Identification:  Name: Magui Dumont Age: 68 y.o. Sex: female :  1955 MRN: [id number]                            Problem List:  Vaginal scar  Labial adhesions    Past Medical History:  Past Medical History:   Diagnosis Date    Depression     History of COVID-19     Hyperlipidemia     Hypertension     Hypothyroidism     Incontinence     HISTORY    PONV (postoperative nausea and vomiting)     RBBB     Seasonal allergic rhinitis     Type 2 diabetes mellitus     Uterovaginal prolapse     CYTOCELE, VAGINAL ENTEROCELE AND RECTOCELE    Vaginal adhesions     Vitamin D deficiency      Past Surgical History:  Past Surgical History:   Procedure Laterality Date    CHOLECYSTECTOMY      COLONOSCOPY N/A 9/15/2017    Procedure: COLONOSCOPY;  Surgeon: Brian Puckett MD;  Location: [facility] ENDOSCOPY;  Service:     D & C HYSTEROSCOPY N/A 2023    Procedure: HYSTEROSCOPY with fractional DILATATION AND CURETTAGE pap smear cystourethroscopy;  Surgeon: Magui Ribeiro MD;  Location: [facility];  Service: Gynecology;  Laterality: N/A;    EYE SURGERY Left     cataract     TONSILLECTOMY      TOTAL LAPAROSCOPIC HYSTERECTOMY, SACROCOLPOPEXY N/A 2023    Procedure: Laparoscopic uterosacral ligament colpopexy sacral colpopexy Laparoscopic paravaginal repair Laparoscopic hysterectomy with bilateral salpingectomy Laparoscopic abdominal enterocele repair Pubovaginal sling Posterior colporrhaphy  Insertion of vaginal grafts Cystourethroscopy;  Surgeon: Magui Ribeiro MD;  Location: [facility] OR;  Service: Gynecology;  Laterality: N/A;      Home Meds:  No medications prior to admission.     Current Meds:   No current facility-administered medications for this  encounter.    Current Outpatient Medications:     citalopram (CeleXA) 20 MG tablet, Take 1 tablet by mouth once daily (Patient taking differently: Take 1 tablet by mouth Daily.), Disp: 90 tablet, Rfl: 1    glucose blood test strip, OneTouch Verio In Vitro Strip; Patient Sig: CHECK BLOOD GLUCOSE 1 TIME A DAY; E11.65, Disp: 100 each, Rfl: 3    insulin degludec (Tresiba FlexTouch) 100 UNIT/ML solution pen-injector injection, INJECT 30 UNITS SUBCUTANEOUSLY ONCE DAILY AS DIRECTED (Patient taking differently: Inject 30 Units under the skin into the appropriate area as directed Daily.), Disp: 9 mL, Rfl: 0    Insulin Pen Needle (ReliOn Pen Needles) 32G X 4 MM misc, 1 each by Other route Daily., Disp: 100 each, Rfl: 3    levothyroxine (SYNTHROID, LEVOTHROID) 100 MCG tablet, Take 1 tablet by mouth once daily (Patient taking differently: Take 1 tablet by mouth Daily.), Disp: 90 tablet, Rfl: 1    lisinopril-hydrochlorothiazide (PRINZIDE,ZESTORETIC) 20-12.5 MG per tablet, Take 1 tablet by mouth Daily., Disp: , Rfl:     metFORMIN (GLUCOPHAGE) 1000 MG tablet, TAKE 1 TABLET BY MOUTH TWICE DAILY WITH MEALS (Patient taking differently: Take 1 tablet by mouth 2 (Two) Times a Day With Meals.), Disp: 180 tablet, Rfl: 3    nystatin [id number] UNIT/GM powder, APPLY  POWDER TOPICALLY TO AFFECTED AREA AS DIRECTED TWICE DAILY (Patient taking differently: Apply 1 application  topically to the appropriate area as directed 2 (Two) Times a Day As Needed (TO RED AREA  AS NEEDED).), Disp: 60 g, Rfl: 2    Omega-3 Fatty Acids (FISH OIL) 1000 MG capsule delayed-release, Take 1 capsule by mouth Daily. HOLD ONE WEEK FOR SURGERY, Disp: , Rfl:     omeprazole (priLOSEC) 20 MG capsule, Take 1 capsule by mouth Daily., Disp: , Rfl:     ONETOUCH DELICA LANCETS FINE misc, , Disp: , Rfl:     rosuvastatin (CRESTOR) 20 MG tablet, Take 1 tablet by mouth Every Night., Disp: 90 tablet, Rfl: 3    SITagliptin (Januvia) 100 MG tablet, Take 1 tablet by mouth Every Morning.,  Disp: 30 tablet, Rfl: 3    vitamin D (ERGOCALCIFEROL) 1.25 MG (98306 UT) capsule capsule, Take 1 capsule by mouth 1 (One) Time Per Week., Disp: 4 capsule, Rfl: 3  Allergies:  No Known Allergies  Immunizations:  Immunization History   Administered Date(s) Administered    COVID-19 (PFIZER) Purple Cap Monovalent 2021, 2021, 2021    Flu Vaccine Quad PF 6-35MO 2018    Fluad Quad 65+ 10/15/2022    Fluzone High Dose =>65 Years (Vaxcare ONLY) 10/02/2021    PPD Test 2017    Pneumococcal Polysaccharide (PPSV23) 2019    Tdap 2017     Social History:   Social History     Tobacco Use    Smoking status: Never    Smokeless tobacco: Never   Substance Use Topics    Alcohol use: No      Family History:  Family History   Problem Relation Age of Onset    Lung cancer Mother         smoker    Diabetes type II Father          at 82    Coronary artery disease Father     No Known Problems Sister     No Known Problems Sister     Cancer Brother         cholangiocarcinoma ( 54)    Diabetes type II Brother     Lung cancer Maternal Aunt         Adenocarcinoma (6 sisters)    Colon cancer Neg Hx     Malig Hyperthermia Neg Hx         Review of Systems  Constitutional:  Denies fever or chills   Eyes:  Denies change in visual acuity   HEENT:  Denies nasal congestion or sore throat   Respiratory:  Denies cough or shortness of breath   Cardiovascular:  Denies chest pain or edema   GI:  Denies abdominal pain, nausea, vomiting, bloody stools or diarrhea   :  Denies dysuria   Musculoskeletal:  Denies back pain or joint pain   Integument:  Denies rash   Neurologic:  Denies headache, focal weakness or sensory changes   Endocrine:  Denies polyuria or polydipsia   Lymphatic:  Denies swollen glands   Psychiatric:  Denies depression or anxiety       Objective:  tMax 24 hrs: No data recorded.    Vitals Ranges:        Exam:  Vital Signs: There were no vitals taken for this visit.  Constitutional:  Well  developed, well nourished, no acute distress, non-toxic appearance    GI:  Soft, nondistended, normal bowel sounds, nontender, no organomegaly, no mass, no rebound, no guarding   :  No costovertebral angle tenderness   Musculoskeletal:  No edema, no tenderness, no deformities. Back- no tenderness  Integument:  Well hydrated, no rash   Lymphatic:  No lymphadenopathy noted   Neurologic:  Alert & oriented x 3,  Pelvic:     POPQ  +2  +2  -4    3    2    8  -2   -2        Assessment:    Vaginal scar band  Labial adhesions        Plan:  LYSIS OF LABIAL ADHESIONS , REMOVAL OF VAGINAL SCARBAND, CYSTOURETHROSCOPY     Magui Ribeiro MD  12/18/2023

## 2023-12-19 NOTE — ANESTHESIA POSTPROCEDURE EVALUATION
Patient: Magui Dumont    Procedure Summary       Date: 12/19/23 Room / Location: [facility] OR [address] [place] [address] MAIN OR    Anesthesia Start: 0727 Anesthesia Stop: 1056    Procedure: LYSIS OF LABIAL ADHESIONS , REMOVAL OF VAGINAL SCARBAND, perineorrhaphy, perineoplasty, plastic repair of introitus CYSTOURETHROSCOPY (Labia) Diagnosis:     Surgeons: Magui Ribeiro MD Provider: Amina Patel MD    Anesthesia Type: general ASA Status: 3            Anesthesia Type: general    Vitals  Vitals Value Taken Time   /68 12/19/23 1300   Temp 36.8 °C (98.2 °F) 12/19/23 1052   Pulse 98 12/19/23 1313   Resp 16 12/19/23 1300   SpO2 96 % 12/19/23 1313   Vitals shown include unfiled device data.        Post Anesthesia Care and Evaluation    Patient location during evaluation: bedside  Patient participation: complete - patient participated  Level of consciousness: awake and alert  Pain score: 0  Pain management: adequate    Airway patency: patent  Anesthetic complications: No anesthetic complications    Cardiovascular status: acceptable  Respiratory status: acceptable  Hydration status: acceptable    Comments: /68   Pulse 92   Temp 36.8 °C (98.2 °F) (Oral)   Resp 16   SpO2 94%

## 2023-12-20 DIAGNOSIS — Z79.4 TYPE 2 DIABETES MELLITUS WITH HYPERGLYCEMIA, WITH LONG-TERM CURRENT USE OF INSULIN: ICD-10-CM

## 2023-12-20 DIAGNOSIS — E11.65 TYPE 2 DIABETES MELLITUS WITH HYPERGLYCEMIA, WITH LONG-TERM CURRENT USE OF INSULIN: ICD-10-CM

## 2023-12-20 PROBLEM — N94.11 INTROITAL DYSPAREUNIA: Status: ACTIVE | Noted: 2023-12-20

## 2023-12-20 PROBLEM — N89.5 VAGINAL STENOSIS: Status: ACTIVE | Noted: 2023-12-20

## 2023-12-20 NOTE — OP NOTE
OPERATIVE REPORT     [facility] MAIN OR     Patient:    Magui Dumont         :   1955     Date of Operation:  2023     Pre-operative Diagnosis:  Labial adhesions N90.89  Vaginal stenosis N89.5  Vaginal scar N89.8  Introital dyspareunia N94.11     Post-operative Diagnosis:  same       Surgeon:  Magui Ribeiro MD          Scrub: Katie Vargas CST         Name of Operation/Procedure:     Perineorrhaphy, 70762  Plastic repair of introitus, 90689  Lysis of labial adhesions, 58909   Cystourethroscopy     Findings: On cystourethroscopy there was bilateral efflux of urine from both the right and the left ureteral orifices. There were no lesions or foreign material of the bladder or the urethra.   Dilator #5    Specimens:  none     Description of procedure: The patient was taken to the Operating Room with a peripheral IV in place. She underwent the induction of general endotracheal anesthesia, was prepped and draped in the dorsal lithotomy position in [facility]. Cystourethroscopy showed no lesions or foreign material of the bladder or the urethra. There is bilateral efflux of urine from both the right and the left ureteral orifices. A Gustafson catheter was placed.       A posterior vaginal incision was made and the vaginal scar band released. The epithelial edges were released and the underlying tissues were dissected and reconstructed anterior to posterior repairing the vaginal stenosis and reconstructing the perineum to provide strength to the pelvic floor. The posterior vaginal epithelium was closed with 2-0 vicryl. Lysis of labial adhesions bilaterally was done with fine tip cautery releasing the bands of the labia minora.  The Gustafson catheter was removed. Estrogen cream and clobetasol ointment were placed on the vulva.  The patient was taken out of the dorsal lithotomy position, awakened from general anesthesia, and taken to the Recovery Room in good condition. There were no complications to the  procedures. All final needle, sponge, and instrument counts were reported as correct.          Urine output: 200 mL    Estimated blood loss: 50 mL    Fluids: 1500 mL          Magui Ribeiro MD, MSc, FACOG, FACS

## 2024-01-10 DIAGNOSIS — E11.65 TYPE 2 DIABETES MELLITUS WITH HYPERGLYCEMIA, WITH LONG-TERM CURRENT USE OF INSULIN: Chronic | ICD-10-CM

## 2024-01-10 DIAGNOSIS — Z79.4 TYPE 2 DIABETES MELLITUS WITH HYPERGLYCEMIA, WITH LONG-TERM CURRENT USE OF INSULIN: Chronic | ICD-10-CM

## 2024-01-10 RX ORDER — INSULIN DEGLUDEC INJECTION 100 U/ML
INJECTION, SOLUTION SUBCUTANEOUS
Qty: 9 ML | Refills: 3 | Status: SHIPPED | OUTPATIENT
Start: 2024-01-10

## 2024-01-13 DIAGNOSIS — Z79.4 TYPE 2 DIABETES MELLITUS WITH HYPERGLYCEMIA, WITH LONG-TERM CURRENT USE OF INSULIN: ICD-10-CM

## 2024-01-13 DIAGNOSIS — E11.65 TYPE 2 DIABETES MELLITUS WITH HYPERGLYCEMIA, WITH LONG-TERM CURRENT USE OF INSULIN: ICD-10-CM

## 2024-01-15 RX ORDER — SITAGLIPTIN 100 MG/1
100 TABLET, FILM COATED ORAL EVERY MORNING
Qty: 30 TABLET | Refills: 5 | Status: SHIPPED | OUTPATIENT
Start: 2024-01-15

## 2024-02-07 DIAGNOSIS — E03.9 HYPOTHYROIDISM, UNSPECIFIED TYPE: ICD-10-CM

## 2024-02-07 RX ORDER — LEVOTHYROXINE SODIUM 0.1 MG/1
TABLET ORAL
Qty: 90 TABLET | Refills: 1 | Status: SHIPPED | OUTPATIENT
Start: 2024-02-07

## 2024-02-12 ENCOUNTER — APPOINTMENT (OUTPATIENT)
Dept: WOMENS IMAGING | Facility: HOSPITAL | Age: 69
End: 2024-02-12
Payer: MEDICARE

## 2024-02-12 PROCEDURE — 77063 BREAST TOMOSYNTHESIS BI: CPT | Performed by: RADIOLOGY

## 2024-02-12 PROCEDURE — 77067 SCR MAMMO BI INCL CAD: CPT | Performed by: RADIOLOGY

## 2024-02-21 DIAGNOSIS — E11.9 TYPE 2 DIABETES MELLITUS WITHOUT COMPLICATION, WITH LONG-TERM CURRENT USE OF INSULIN: ICD-10-CM

## 2024-02-21 DIAGNOSIS — E55.9 VITAMIN D DEFICIENCY: ICD-10-CM

## 2024-02-21 DIAGNOSIS — E03.9 HYPOTHYROIDISM, UNSPECIFIED TYPE: ICD-10-CM

## 2024-02-21 DIAGNOSIS — E78.5 DYSLIPIDEMIA: Chronic | ICD-10-CM

## 2024-02-21 DIAGNOSIS — Z79.4 TYPE 2 DIABETES MELLITUS WITHOUT COMPLICATION, WITH LONG-TERM CURRENT USE OF INSULIN: ICD-10-CM

## 2024-02-21 DIAGNOSIS — I10 BENIGN ESSENTIAL HYPERTENSION: Chronic | ICD-10-CM

## 2024-02-21 RX ORDER — ERGOCALCIFEROL 1.25 MG/1
50000 CAPSULE ORAL WEEKLY
Qty: 13 CAPSULE | Refills: 0 | Status: SHIPPED | OUTPATIENT
Start: 2024-02-21

## 2024-03-16 DIAGNOSIS — F32.A DEPRESSION, UNSPECIFIED DEPRESSION TYPE: ICD-10-CM

## 2024-03-18 RX ORDER — CITALOPRAM 20 MG/1
TABLET ORAL
Qty: 30 TABLET | Refills: 0 | Status: SHIPPED | OUTPATIENT
Start: 2024-03-18

## 2024-03-27 DIAGNOSIS — E78.5 DYSLIPIDEMIA: ICD-10-CM

## 2024-03-27 RX ORDER — ROSUVASTATIN CALCIUM 20 MG/1
20 TABLET, COATED ORAL NIGHTLY
Qty: 90 TABLET | Refills: 1 | OUTPATIENT
Start: 2024-03-27

## 2024-04-01 RX ORDER — ROSUVASTATIN CALCIUM 20 MG/1
20 TABLET, COATED ORAL NIGHTLY
Qty: 90 TABLET | Refills: 1 | OUTPATIENT
Start: 2024-04-01

## 2024-04-09 ENCOUNTER — OFFICE VISIT (OUTPATIENT)
Dept: INTERNAL MEDICINE | Age: 69
End: 2024-04-09
Payer: MEDICARE

## 2024-04-09 VITALS
HEART RATE: 114 BPM | HEIGHT: 66 IN | TEMPERATURE: 96.9 F | SYSTOLIC BLOOD PRESSURE: 120 MMHG | DIASTOLIC BLOOD PRESSURE: 70 MMHG | WEIGHT: 157 LBS | OXYGEN SATURATION: 98 % | BODY MASS INDEX: 25.23 KG/M2

## 2024-04-09 DIAGNOSIS — Z79.4 TYPE 2 DIABETES MELLITUS WITH HYPERGLYCEMIA, WITH LONG-TERM CURRENT USE OF INSULIN: Primary | Chronic | ICD-10-CM

## 2024-04-09 DIAGNOSIS — I10 BENIGN ESSENTIAL HYPERTENSION: Chronic | ICD-10-CM

## 2024-04-09 DIAGNOSIS — E78.5 DYSLIPIDEMIA: Chronic | ICD-10-CM

## 2024-04-09 DIAGNOSIS — E03.8 HYPOTHYROIDISM DUE TO HASHIMOTO'S THYROIDITIS: Chronic | ICD-10-CM

## 2024-04-09 DIAGNOSIS — E11.65 TYPE 2 DIABETES MELLITUS WITH HYPERGLYCEMIA, WITH LONG-TERM CURRENT USE OF INSULIN: Primary | Chronic | ICD-10-CM

## 2024-04-09 DIAGNOSIS — E06.3 HYPOTHYROIDISM DUE TO HASHIMOTO'S THYROIDITIS: Chronic | ICD-10-CM

## 2024-04-09 LAB
CHOLEST SERPL-MCNC: 150 MG/DL (ref 0–200)
CHOLEST/HDLC SERPL: 3.26 {RATIO}
HBA1C MFR BLD: 8.6 % (ref 4.8–5.6)
HDLC SERPL-MCNC: 46 MG/DL (ref 40–60)
LDLC SERPL CALC-MCNC: 79 MG/DL (ref 0–100)
TRIGL SERPL-MCNC: 140 MG/DL (ref 0–150)
VLDLC SERPL CALC-MCNC: 25 MG/DL (ref 5–40)

## 2024-04-09 PROCEDURE — 3078F DIAST BP <80 MM HG: CPT | Performed by: INTERNAL MEDICINE

## 2024-04-09 PROCEDURE — 3074F SYST BP LT 130 MM HG: CPT | Performed by: INTERNAL MEDICINE

## 2024-04-09 PROCEDURE — 99214 OFFICE O/P EST MOD 30 MIN: CPT | Performed by: INTERNAL MEDICINE

## 2024-04-09 PROCEDURE — G2211 COMPLEX E/M VISIT ADD ON: HCPCS | Performed by: INTERNAL MEDICINE

## 2024-04-09 RX ORDER — BLOOD-GLUCOSE SENSOR
1 EACH MISCELLANEOUS
Qty: 2 EACH | Refills: 5 | Status: SHIPPED | OUTPATIENT
Start: 2024-04-09

## 2024-04-09 NOTE — PROGRESS NOTES
I N T E R N A L  M E D I C I N E    J U N O H  K I M,  M D      ENCOUNTER DATE:  04/09/2024    Magui Dumont / 68 y.o. / female    CHIEF COMPLAINT / REASON FOR OFFICE VISIT     Diabetes, dyslipidmia, and Hypothyroidism      ASSESSMENT & PLAN     Problem List Items Addressed This Visit          High    Type 2 diabetes mellitus with hyperglycemia, with long-term current use of insulin - Primary (Chronic)    Overview     * Previously managed by endocrinologist (Bartolo).         Current Assessment & Plan     Currently taking Januvia 100 mg, metformin 1000 mg BID, Tresiba 30 units daily, and Mounjaro 2.5 mg weekly (prescribed by an outside provider during insurance issue with [employer]).     Continue Mounjaro 2.5 mg for 2 more weekly. Thereafter we can titrate the dose to 5 mg weekly if desired. At that time she will need to stop Januvia. She expressed understanding and agreed to follow above instructions.     Check A1c today.     Lab Results   Component Value Date    HGBA1C 8.00 (H) 09/13/2023    HGBA1C 9.40 (H) 08/18/2023    HGBA1C 7.40 (H) 03/31/2023             Relevant Medications    glucose blood test strip    lisinopril-hydrochlorothiazide (PRINZIDE,ZESTORETIC) 20-12.5 MG per tablet    Insulin Pen Needle (ReliOn Pen Needles) 32G X 4 MM misc    metFORMIN (GLUCOPHAGE) 1000 MG tablet    Tresiba FlexTouch 100 UNIT/ML solution pen-injector injection    Januvia 100 MG tablet    vitamin D (ERGOCALCIFEROL) 1.25 MG (57246 UT) capsule capsule    Continuous Blood Gluc Sensor (FreeStyle Milena 3 Sensor) misc    Other Relevant Orders    Hemoglobin A1c       Medium    Benign essential hypertension (Chronic)    Overview     Continue lisinopril-hctz 20/12.5 mg qd.          Relevant Medications    lisinopril-hydrochlorothiazide (PRINZIDE,ZESTORETIC) 20-12.5 MG per tablet    vitamin D (ERGOCALCIFEROL) 1.25 MG (22391 UT) capsule capsule    Dyslipidemia (Chronic)    Overview     Continue rosuvastatin 20 mg nightly.          Relevant  "Medications    vitamin D (ERGOCALCIFEROL) 1.25 MG (12969 UT) capsule capsule    rosuvastatin (CRESTOR) 20 MG tablet    Other Relevant Orders    Lipid Panel With / Chol / HDL Ratio    Hypothyroidism (Chronic)    Overview     Continue levothyroxine 100 mcg         Relevant Medications    levothyroxine (SYNTHROID, LEVOTHROID) 100 MCG tablet    vitamin D (ERGOCALCIFEROL) 1.25 MG (94868 UT) capsule capsule     Orders Placed This Encounter   Procedures    Hemoglobin A1c    Lipid Panel With / Chol / HDL Ratio     New Medications Ordered This Visit   Medications    Continuous Blood Gluc Sensor (FreeStyle Milena 3 Sensor) misc     Sig: Use 1 Units Every 14 (Fourteen) Days.     Dispense:  2 each     Refill:  5       SUMMARY/DISCUSSION      Next Appointment with me: Visit date not found    Return in about 5 months (around 9/9/2024) for Reassess chronic medical problems.      VITAL SIGNS     Vitals:    04/09/24 1419   BP: 120/70   Pulse: 114   Temp: 96.9 °F (36.1 °C)   SpO2: 98%   Weight: 71.2 kg (157 lb)   Height: 166.4 cm (65.5\")       BP Readings from Last 3 Encounters:   04/09/24 120/70   12/19/23 125/75   12/12/23 125/78     Wt Readings from Last 3 Encounters:   04/09/24 71.2 kg (157 lb)   12/12/23 73.8 kg (162 lb 9.6 oz)   09/20/23 70.3 kg (155 lb)     Body mass index is 25.73 kg/m².    Blood pressure readings recorded on patient flowsheet:       No data to display                  MEDICATIONS AT THE TIME OF OFFICE VISIT     Current Outpatient Medications on File Prior to Visit   Medication Sig    citalopram (CeleXA) 20 MG tablet Take 1 tablet by mouth once daily    glucose blood test strip OneTouch Verio In Vitro Strip; Patient Sig: CHECK BLOOD GLUCOSE 1 TIME A DAY; E11.65    Insulin Pen Needle (ReliOn Pen Needles) 32G X 4 MM misc 1 each by Other route Daily.    Januvia 100 MG tablet TAKE 1 TABLET BY MOUTH ONCE DAILY IN THE MORNING    levothyroxine (SYNTHROID, LEVOTHROID) 100 MCG tablet Take 1 tablet by mouth once daily "    lisinopril-hydrochlorothiazide (PRINZIDE,ZESTORETIC) 20-12.5 MG per tablet Take 1 tablet by mouth Daily.    metFORMIN (GLUCOPHAGE) 1000 MG tablet TAKE 1 TABLET BY MOUTH TWICE DAILY WITH MEALS    nystatin 185572 UNIT/GM powder APPLY  POWDER TOPICALLY TO AFFECTED AREA AS DIRECTED TWICE DAILY (Patient taking differently: Apply 1 Application topically to the appropriate area as directed 2 (Two) Times a Day As Needed (TO RED AREA  AS NEEDED).)    Omega-3 Fatty Acids (FISH OIL) 1000 MG capsule delayed-release Take 1 capsule by mouth Daily. HOLD ONE WEEK FOR SURGERY    omeprazole (priLOSEC) 20 MG capsule Take 1 capsule by mouth Daily.    ONETOUCH DELICA LANCETS FINE misc     rosuvastatin (CRESTOR) 20 MG tablet Take 1 tablet by mouth Every Night.    Tresiba FlexTouch 100 UNIT/ML solution pen-injector injection INJECT 30 UNITS SUBCUTANEOUSLY ONCE DAILY AS DIRECTED    vitamin D (ERGOCALCIFEROL) 1.25 MG (90570 UT) capsule capsule Take 1 capsule by mouth once a week     No current facility-administered medications on file prior to visit.          HISTORY OF PRESENT ILLNESS     During the insurance issue with [employer] she saw an outside provider who switched Ozempic to Mounjaro 2.5 mg dose. She is still taking Januvia 100 mg, metformin 1000 mg BID, and Tresiba 30 units daily. She is off Ozempic. Denies significant side effects from medications. She is now on Milena 3 for CGM and likes it. Needs refill for the sensors.     Hypertension, hyperlipidemia and hypothyroidism remain controlled with medications.      Patient Care Team:  Venkat Brown MD as PCP - General (Internal Medicine)  Harsh Velasco MD as Consulting Physician (Obstetrics and Gynecology)    REVIEW OF SYSTEMS     Review of Systems       PHYSICAL EXAMINATION     Physical Exam  Alert with normal thought and judgment.   Mild weight loss noted  Cardiovascular: Normal rate, regular rhythm.   Pulm/Chest: Effort normal, breath sounds normal.       REVIEWED DATA      Labs:     Lab Results   Component Value Date     12/12/2023    K 3.7 12/12/2023    CALCIUM 9.5 12/12/2023    AST 25 11/15/2021    ALT 26 11/15/2021    BUN 9 12/12/2023    CREATININE 0.61 12/12/2023    CREATININE 0.80 08/09/2023    CREATININE 0.65 02/17/2023    EGFRRESULT 84 05/16/2022       Lab Results   Component Value Date    HGBA1C 8.00 (H) 09/13/2023    HGBA1C 9.40 (H) 08/18/2023    HGBA1C 7.40 (H) 03/31/2023       Lab Results   Component Value Date    LDL 80 07/20/2022    LDL 68 03/30/2021    LDL 67 10/06/2020    HDL 42 07/20/2022    HDL 47 03/30/2021    TRIG 131 07/20/2022    TRIG 135 03/30/2021       Lab Results   Component Value Date    TSH 1.160 07/20/2022    TSH 2.200 08/11/2021    TSH 0.252 (L) 03/30/2021    FREET4 1.52 07/20/2022    FREET4 1.63 08/11/2021    FREET4 1.95 (H) 10/06/2020       Lab Results   Component Value Date    WBC 7.37 12/12/2023    HGB 13.0 12/12/2023     12/12/2023       Lab Results   Component Value Date    MALBCRERATIO <9 03/30/2021           Imaging:           Medical Tests:           Summary of old records / correspondence / consultant report:           Request outside records:

## 2024-04-09 NOTE — ASSESSMENT & PLAN NOTE
Currently taking Januvia 100 mg, metformin 1000 mg BID, Tresiba 30 units daily, and Mounjaro 2.5 mg weekly (prescribed by an outside provider [facility]).     Continue Mounjaro 2.5 mg for 2 more weekly. Thereafter we can titrate the dose to 5 mg weekly if desired. At that time she will need to stop Januvia. She expressed understanding and agreed to follow above instructions.     Check A1c today.     Lab Results   Component Value Date    HGBA1C 8.00 (H) 09/13/2023    HGBA1C 9.40 (H) 08/18/2023    HGBA1C 7.40 (H) 03/31/2023

## 2024-04-10 DIAGNOSIS — E11.65 TYPE 2 DIABETES MELLITUS WITH HYPERGLYCEMIA, WITH LONG-TERM CURRENT USE OF INSULIN: Primary | ICD-10-CM

## 2024-04-10 DIAGNOSIS — Z79.4 TYPE 2 DIABETES MELLITUS WITH HYPERGLYCEMIA, WITH LONG-TERM CURRENT USE OF INSULIN: Primary | ICD-10-CM

## 2024-04-10 NOTE — PROGRESS NOTES
CALL PATIENT with results:    1) A1c level for blood sugar average is higher at 8.6. See if she can get the prior A1c lab result from the outsider provider she was seeing if possible as that would be useful.     It could be higher due to transitioning from Ozempic to Mounjaro. Verify dose of Mounjaro she is currently taking. Since she reported no significant side effects from Mounjaro, if she is presently taking 2.5 mg dose, lets increase it to 5 mg dose at this time.  Send order to me for authorization if she is agreeable.  Also remind her to discontinue Januvia when she starts the 5 mg dose of Mounjaro.   - Change her follow up to 3 months. Check A1c prior to followup.     2) Cholesterol is overall stable.

## 2024-04-13 DIAGNOSIS — F32.A DEPRESSION, UNSPECIFIED DEPRESSION TYPE: ICD-10-CM

## 2024-04-15 RX ORDER — CITALOPRAM 20 MG/1
TABLET ORAL
Qty: 30 TABLET | Refills: 5 | Status: SHIPPED | OUTPATIENT
Start: 2024-04-15

## 2024-04-28 DIAGNOSIS — E11.65 TYPE 2 DIABETES MELLITUS WITH HYPERGLYCEMIA, WITH LONG-TERM CURRENT USE OF INSULIN: Chronic | ICD-10-CM

## 2024-04-28 DIAGNOSIS — Z79.4 TYPE 2 DIABETES MELLITUS WITH HYPERGLYCEMIA, WITH LONG-TERM CURRENT USE OF INSULIN: Chronic | ICD-10-CM

## 2024-04-29 RX ORDER — INSULIN DEGLUDEC 100 U/ML
INJECTION, SOLUTION SUBCUTANEOUS
Qty: 9 ML | Refills: 1 | Status: SHIPPED | OUTPATIENT
Start: 2024-04-29

## 2024-05-06 DIAGNOSIS — E11.65 TYPE 2 DIABETES MELLITUS WITH HYPERGLYCEMIA, WITH LONG-TERM CURRENT USE OF INSULIN: ICD-10-CM

## 2024-05-06 DIAGNOSIS — Z79.4 TYPE 2 DIABETES MELLITUS WITH HYPERGLYCEMIA, WITH LONG-TERM CURRENT USE OF INSULIN: ICD-10-CM

## 2024-05-13 DIAGNOSIS — Z79.4 TYPE 2 DIABETES MELLITUS WITH HYPERGLYCEMIA, WITH LONG-TERM CURRENT USE OF INSULIN: ICD-10-CM

## 2024-05-13 DIAGNOSIS — E11.65 TYPE 2 DIABETES MELLITUS WITH HYPERGLYCEMIA, WITH LONG-TERM CURRENT USE OF INSULIN: ICD-10-CM

## 2024-05-14 RX ORDER — TIRZEPATIDE 5 MG/.5ML
INJECTION, SOLUTION SUBCUTANEOUS
Qty: 2 ML | Refills: 2 | Status: SHIPPED | OUTPATIENT
Start: 2024-05-14

## 2024-05-20 DIAGNOSIS — E78.5 DYSLIPIDEMIA: Chronic | ICD-10-CM

## 2024-05-20 DIAGNOSIS — E55.9 VITAMIN D DEFICIENCY: ICD-10-CM

## 2024-05-20 DIAGNOSIS — Z79.4 TYPE 2 DIABETES MELLITUS WITHOUT COMPLICATION, WITH LONG-TERM CURRENT USE OF INSULIN: ICD-10-CM

## 2024-05-20 DIAGNOSIS — E03.9 HYPOTHYROIDISM, UNSPECIFIED TYPE: ICD-10-CM

## 2024-05-20 DIAGNOSIS — E11.9 TYPE 2 DIABETES MELLITUS WITHOUT COMPLICATION, WITH LONG-TERM CURRENT USE OF INSULIN: ICD-10-CM

## 2024-05-20 DIAGNOSIS — I10 BENIGN ESSENTIAL HYPERTENSION: Chronic | ICD-10-CM

## 2024-05-20 RX ORDER — ERGOCALCIFEROL 1.25 MG/1
50000 CAPSULE ORAL WEEKLY
Qty: 13 CAPSULE | Refills: 0 | Status: SHIPPED | OUTPATIENT
Start: 2024-05-20

## 2024-06-12 DIAGNOSIS — E11.65 TYPE 2 DIABETES MELLITUS WITH HYPERGLYCEMIA, WITH LONG-TERM CURRENT USE OF INSULIN: ICD-10-CM

## 2024-06-12 DIAGNOSIS — Z79.4 TYPE 2 DIABETES MELLITUS WITH HYPERGLYCEMIA, WITH LONG-TERM CURRENT USE OF INSULIN: ICD-10-CM

## 2024-06-21 DIAGNOSIS — Z79.4 TYPE 2 DIABETES MELLITUS WITH HYPERGLYCEMIA, WITH LONG-TERM CURRENT USE OF INSULIN: Chronic | ICD-10-CM

## 2024-06-21 DIAGNOSIS — E11.65 TYPE 2 DIABETES MELLITUS WITH HYPERGLYCEMIA, WITH LONG-TERM CURRENT USE OF INSULIN: Chronic | ICD-10-CM

## 2024-06-21 RX ORDER — INSULIN DEGLUDEC 100 U/ML
INJECTION, SOLUTION SUBCUTANEOUS
Qty: 9 ML | Refills: 1 | Status: SHIPPED | OUTPATIENT
Start: 2024-06-21

## 2024-07-10 ENCOUNTER — OFFICE VISIT (OUTPATIENT)
Dept: INTERNAL MEDICINE | Age: 69
End: 2024-07-10
Payer: MEDICARE

## 2024-07-10 VITALS
TEMPERATURE: 96.8 F | DIASTOLIC BLOOD PRESSURE: 70 MMHG | SYSTOLIC BLOOD PRESSURE: 110 MMHG | BODY MASS INDEX: 24.27 KG/M2 | OXYGEN SATURATION: 96 % | HEART RATE: 96 BPM | HEIGHT: 66 IN | WEIGHT: 151 LBS

## 2024-07-10 DIAGNOSIS — Z78.0 POSTMENOPAUSAL STATE: ICD-10-CM

## 2024-07-10 DIAGNOSIS — Z13.820 ENCOUNTER FOR SCREENING FOR OSTEOPOROSIS: ICD-10-CM

## 2024-07-10 DIAGNOSIS — E03.8 HYPOTHYROIDISM DUE TO HASHIMOTO'S THYROIDITIS: Chronic | ICD-10-CM

## 2024-07-10 DIAGNOSIS — F32.A DEPRESSION, UNSPECIFIED DEPRESSION TYPE: Chronic | ICD-10-CM

## 2024-07-10 DIAGNOSIS — K64.9 HEMORRHOIDS, UNSPECIFIED HEMORRHOID TYPE: ICD-10-CM

## 2024-07-10 DIAGNOSIS — E78.5 DYSLIPIDEMIA: Chronic | ICD-10-CM

## 2024-07-10 DIAGNOSIS — I10 BENIGN ESSENTIAL HYPERTENSION: Chronic | ICD-10-CM

## 2024-07-10 DIAGNOSIS — Z79.4 TYPE 2 DIABETES MELLITUS WITH HYPERGLYCEMIA, WITH LONG-TERM CURRENT USE OF INSULIN: Primary | Chronic | ICD-10-CM

## 2024-07-10 DIAGNOSIS — E11.65 TYPE 2 DIABETES MELLITUS WITH HYPERGLYCEMIA, WITH LONG-TERM CURRENT USE OF INSULIN: Primary | Chronic | ICD-10-CM

## 2024-07-10 DIAGNOSIS — E06.3 HYPOTHYROIDISM DUE TO HASHIMOTO'S THYROIDITIS: Chronic | ICD-10-CM

## 2024-07-10 PROCEDURE — 1126F AMNT PAIN NOTED NONE PRSNT: CPT | Performed by: INTERNAL MEDICINE

## 2024-07-10 PROCEDURE — 1160F RVW MEDS BY RX/DR IN RCRD: CPT | Performed by: INTERNAL MEDICINE

## 2024-07-10 PROCEDURE — 3074F SYST BP LT 130 MM HG: CPT | Performed by: INTERNAL MEDICINE

## 2024-07-10 PROCEDURE — G2211 COMPLEX E/M VISIT ADD ON: HCPCS | Performed by: INTERNAL MEDICINE

## 2024-07-10 PROCEDURE — 3078F DIAST BP <80 MM HG: CPT | Performed by: INTERNAL MEDICINE

## 2024-07-10 PROCEDURE — 1159F MED LIST DOCD IN RCRD: CPT | Performed by: INTERNAL MEDICINE

## 2024-07-10 PROCEDURE — 99214 OFFICE O/P EST MOD 30 MIN: CPT | Performed by: INTERNAL MEDICINE

## 2024-07-10 PROCEDURE — 3051F HG A1C>EQUAL 7.0%<8.0%: CPT | Performed by: INTERNAL MEDICINE

## 2024-07-10 RX ORDER — HYDROCORTISONE 25 MG/G
CREAM TOPICAL 2 TIMES DAILY
Qty: 28 G | Refills: 2 | Status: SHIPPED | OUTPATIENT
Start: 2024-07-10

## 2024-07-10 RX ORDER — DOCUSATE SODIUM 100 MG/1
1 CAPSULE, LIQUID FILLED ORAL EVERY 12 HOURS SCHEDULED
COMMUNITY
Start: 2024-06-25

## 2024-07-10 RX ORDER — LISINOPRIL 30 MG/1
30 TABLET ORAL DAILY
COMMUNITY
Start: 2024-07-10

## 2024-07-10 RX ORDER — LISINOPRIL 30 MG/1
1 TABLET ORAL DAILY
COMMUNITY
Start: 2024-03-25 | End: 2024-07-10 | Stop reason: SDUPTHER

## 2024-07-10 NOTE — ASSESSMENT & PLAN NOTE
BP Readings from Last 3 Encounters:   07/10/24 110/70   04/09/24 120/70   12/19/23 125/75      Continue lisinopril 30 mg daily

## 2024-07-10 NOTE — ASSESSMENT & PLAN NOTE
Lab Results   Component Value Date    HGBA1C 7.00 (H) 07/08/2024    HGBA1C 8.60 (H) 04/09/2024    HGBA1C 8.00 (H) 09/13/2023    CREATININE 0.61 12/12/2023    LDL 79 04/09/2024    MALBCRERATIO <9 03/30/2021      Significantly improvement in A1c on Mounjaro 5 mg. Continue same.

## 2024-07-10 NOTE — PROGRESS NOTES
I N T E R N A L  M E D I C I N E    J U N O H  K I M,  M D      ENCOUNTER DATE:  07/10/2024    Magui Dumont / 68 y.o. / female    CHIEF COMPLAINT / REASON FOR OFFICE VISIT     Diabetes, Hypertension, Dyslipidemia   <9>, and Hypothyroidism      ASSESSMENT & PLAN     Problem List Items Addressed This Visit          High    Type 2 diabetes mellitus with hyperglycemia, with long-term current use of insulin - Primary (Chronic)    Overview     * Previously managed by endocrinologist (Bartolo).         Current Assessment & Plan     Lab Results   Component Value Date    HGBA1C 7.00 (H) 07/08/2024    HGBA1C 8.60 (H) 04/09/2024    HGBA1C 8.00 (H) 09/13/2023    CREATININE 0.61 12/12/2023    LDL 79 04/09/2024    MALBCRERATIO <9 03/30/2021      Significantly improvement in A1c on Mounjaro 5 mg. Continue same.          Relevant Medications    glucose blood test strip    Insulin Pen Needle (ReliOn Pen Needles) 32G X 4 MM misc    Continuous Blood Gluc Sensor (FreeStyle Milena 3 Sensor) misc    Tirzepatide (Mounjaro) 5 MG/0.5ML solution pen-injector pen    vitamin D (ERGOCALCIFEROL) 1.25 MG (91658 UT) capsule capsule    metFORMIN (GLUCOPHAGE) 1000 MG tablet    Tresiba FlexTouch 100 UNIT/ML solution pen-injector injection    lisinopril (PRINIVIL,ZESTRIL) 30 MG tablet       Medium    Benign essential hypertension (Chronic)    Current Assessment & Plan     BP Readings from Last 3 Encounters:   07/10/24 110/70   04/09/24 120/70   12/19/23 125/75      Continue lisinopril 30 mg daily          Relevant Medications    vitamin D (ERGOCALCIFEROL) 1.25 MG (00778 UT) capsule capsule    lisinopril (PRINIVIL,ZESTRIL) 30 MG tablet    Dyslipidemia (Chronic)    Overview     Continue rosuvastatin 20 mg nightly.          Relevant Medications    rosuvastatin (CRESTOR) 20 MG tablet    vitamin D (ERGOCALCIFEROL) 1.25 MG (23654 UT) capsule capsule    Hypothyroidism (Chronic)    Overview     Continue levothyroxine 100 mcg         Relevant Medications     "levothyroxine (SYNTHROID, LEVOTHROID) 100 MCG tablet    vitamin D (ERGOCALCIFEROL) 1.25 MG (78667 UT) capsule capsule       Low    Depression (Chronic)    Overview     Continue citalopram 20 mg qd.          Relevant Medications    citalopram (CeleXA) 20 MG tablet     Other Visit Diagnoses       Hemorrhoids, unspecified hemorrhoid type        Relevant Medications    Hydrocortisone, Perianal, (Proctosol HC) 2.5 % rectal cream    Encounter for screening for osteoporosis        Relevant Orders    DEXA Bone Density Axial    Postmenopausal state        Relevant Orders    DEXA Bone Density Axial          Orders Placed This Encounter   Procedures    DEXA Bone Density Axial     New Medications Ordered This Visit   Medications    Hydrocortisone, Perianal, (Proctosol HC) 2.5 % rectal cream     Sig: Insert  into the rectum 2 (Two) Times a Day.     Dispense:  28 g     Refill:  2       SUMMARY/DISCUSSION        Next Appointment with me: Visit date not found    Return in about 4 months (around 11/10/2024) for **SCHEDULE COMBINED AWV AND MEDICAL F/U**.        VITAL SIGNS     Vitals:    07/10/24 1101   BP: 110/70   Pulse: 96   Temp: 96.8 °F (36 °C)   SpO2: 96%   Weight: 68.5 kg (151 lb)   Height: 166.4 cm (65.5\")       BP Readings from Last 3 Encounters:   07/10/24 110/70   04/09/24 120/70   12/19/23 125/75     Wt Readings from Last 3 Encounters:   07/10/24 68.5 kg (151 lb)   04/09/24 71.2 kg (157 lb)   12/12/23 73.8 kg (162 lb 9.6 oz)     Body mass index is 24.75 kg/m².    Blood pressure readings recorded on patient flowsheet:       No data to display                MEDICATIONS AT THE TIME OF OFFICE VISIT     Current Outpatient Medications on File Prior to Visit   Medication Sig    citalopram (CeleXA) 20 MG tablet Take 1 tablet by mouth once daily    Continuous Blood Gluc Sensor (FreeStyle Milena 3 Sensor) misc Use 1 Units Every 14 (Fourteen) Days.    docusate sodium (COLACE) 100 MG capsule Take 1 capsule by mouth Every 12 (Twelve) " Hours.    glucose blood test strip OneTouch Verio In Vitro Strip; Patient Sig: CHECK BLOOD GLUCOSE 1 TIME A DAY; E11.65    Insulin Pen Needle (ReliOn Pen Needles) 32G X 4 MM misc 1 each by Other route Daily.    levothyroxine (SYNTHROID, LEVOTHROID) 100 MCG tablet Take 1 tablet by mouth once daily    lisinopril (PRINIVIL,ZESTRIL) 30 MG tablet Take 1 tablet by mouth Daily.    metFORMIN (GLUCOPHAGE) 1000 MG tablet TAKE 1 TABLET BY MOUTH TWICE DAILY WITH MEALS    nystatin 620452 UNIT/GM powder APPLY  POWDER TOPICALLY TO AFFECTED AREA AS DIRECTED TWICE DAILY (Patient taking differently: Apply 1 Application topically to the appropriate area as directed 2 (Two) Times a Day As Needed (TO RED AREA  AS NEEDED).)    Omega-3 Fatty Acids (FISH OIL) 1000 MG capsule delayed-release Take 1 capsule by mouth Daily. HOLD ONE WEEK FOR SURGERY    omeprazole (priLOSEC) 20 MG capsule Take 1 capsule by mouth Daily.    ONETOUCH DELICA LANCETS FINE misc     rosuvastatin (CRESTOR) 20 MG tablet Take 1 tablet by mouth Every Night.    Tirzepatide (Mounjaro) 5 MG/0.5ML solution pen-injector pen INJECT 1/2 (ONE-HALF) ML SUBCUTANEOUSLY ONCE A WEEK AS DIRECTED    Tresiba FlexTouch 100 UNIT/ML solution pen-injector injection INJECT 30 UNITS SUBCUTANEOUSLY ONCE DAILY AS DIRECTED    vitamin D (ERGOCALCIFEROL) 1.25 MG (98465 UT) capsule capsule Take 1 capsule by mouth 1 (One) Time Per Week.    [DISCONTINUED] lisinopril (PRINIVIL,ZESTRIL) 30 MG tablet Take 1 tablet by mouth Daily.    [DISCONTINUED] lisinopril-hydrochlorothiazide (PRINZIDE,ZESTORETIC) 20-12.5 MG per tablet Take 1 tablet by mouth Daily. (Patient not taking: Reported on 7/10/2024)     No current facility-administered medications on file prior to visit.         HISTORY OF PRESENT ILLNESS     A1c is significantly improved at 7.0 from 8.6 after switching to Mounjaro currently 5 mg weekly with only mild nausea side effect.  Denies hypoglycemia problems.  She is also on Tresiba 30 units daily.   She also is on metformin 1000 mg twice daily but has discontinued Januvia.  Hypertension and hyperlipidemia remained stable on current medications without problems.  Depression remains stable on citalopram.    Lab Results   Component Value Date    HGBA1C 7.00 (H) 07/08/2024    HGBA1C 8.60 (H) 04/09/2024    HGBA1C 8.00 (H) 09/13/2023    CREATININE 0.61 12/12/2023    LDL 79 04/09/2024    MALBCRERATIO <9 03/30/2021     Blood sugar readings recorded on patient's flowsheet:       No data to display               68.5 kg (151 lb)    Patient Care Team:  Venkat Brown MD as PCP - General (Internal Medicine)  Harsh Velasco MD as Consulting Physician (Obstetrics and Gynecology)    REVIEW OF SYSTEMS           PHYSICAL EXAMINATION     Physical Exam  Feet:      Comments: Diabetic Foot Exam Performed and Monofilament Test Performed    Monofilament test is normal.       General: No acute distress  Psych: Normal thought and judgment   Cardiovascular Rate: normal. Rhythm: regular. Heart sounds: normal       REVIEWED DATA     Labs:       Lab Results   Component Value Date     12/12/2023    K 3.7 12/12/2023    CALCIUM 9.5 12/12/2023    AST 25 11/15/2021    ALT 26 11/15/2021    BUN 9 12/12/2023    CREATININE 0.61 12/12/2023    CREATININE 0.80 08/09/2023    CREATININE 0.65 02/17/2023    EGFRRESULT 84 05/16/2022       Lab Results   Component Value Date    HGBA1C 7.00 (H) 07/08/2024    HGBA1C 8.60 (H) 04/09/2024    HGBA1C 8.00 (H) 09/13/2023       Lab Results   Component Value Date    LDL 79 04/09/2024    LDL 80 07/20/2022    LDL 68 03/30/2021    HDL 46 04/09/2024    HDL 42 07/20/2022    TRIG 140 04/09/2024    TRIG 131 07/20/2022       Lab Results   Component Value Date    TSH 1.160 07/20/2022    TSH 2.200 08/11/2021    TSH 0.252 (L) 03/30/2021    FREET4 1.52 07/20/2022    FREET4 1.63 08/11/2021    FREET4 1.95 (H) 10/06/2020       Lab Results   Component Value Date    WBC 7.37 12/12/2023    HGB 13.0 12/12/2023      12/12/2023       Lab Results   Component Value Date    MALBCRERATIO <9 03/30/2021           Imaging:           Medical Tests:           Summary of old records / correspondence / consultant report:           Request outside records:

## 2024-07-14 DIAGNOSIS — E78.5 DYSLIPIDEMIA: ICD-10-CM

## 2024-07-15 RX ORDER — ROSUVASTATIN CALCIUM 20 MG/1
20 TABLET, COATED ORAL NIGHTLY
Qty: 90 TABLET | Refills: 1 | Status: SHIPPED | OUTPATIENT
Start: 2024-07-15

## 2024-07-28 DIAGNOSIS — E03.9 HYPOTHYROIDISM, UNSPECIFIED TYPE: ICD-10-CM

## 2024-07-30 RX ORDER — LEVOTHYROXINE SODIUM 0.1 MG/1
TABLET ORAL
Qty: 90 TABLET | Refills: 1 | Status: SHIPPED | OUTPATIENT
Start: 2024-07-30

## 2024-08-09 ENCOUNTER — HOSPITAL ENCOUNTER (OUTPATIENT)
Dept: BONE DENSITY | Facility: HOSPITAL | Age: 69
Discharge: HOME | End: 2024-08-09
Payer: MEDICARE

## 2024-08-09 PROCEDURE — 77080 DXA BONE DENSITY AXIAL: CPT

## 2024-08-14 DIAGNOSIS — E78.5 DYSLIPIDEMIA: Chronic | ICD-10-CM

## 2024-08-14 DIAGNOSIS — E03.9 HYPOTHYROIDISM, UNSPECIFIED TYPE: ICD-10-CM

## 2024-08-14 DIAGNOSIS — E11.9 TYPE 2 DIABETES MELLITUS WITHOUT COMPLICATION, WITH LONG-TERM CURRENT USE OF INSULIN: ICD-10-CM

## 2024-08-14 DIAGNOSIS — I10 BENIGN ESSENTIAL HYPERTENSION: Chronic | ICD-10-CM

## 2024-08-14 DIAGNOSIS — Z79.4 TYPE 2 DIABETES MELLITUS WITHOUT COMPLICATION, WITH LONG-TERM CURRENT USE OF INSULIN: ICD-10-CM

## 2024-08-14 DIAGNOSIS — E55.9 VITAMIN D DEFICIENCY: ICD-10-CM

## 2024-08-14 RX ORDER — ERGOCALCIFEROL 1.25 MG/1
50000 CAPSULE ORAL WEEKLY
Qty: 13 CAPSULE | Refills: 0 | Status: SHIPPED | OUTPATIENT
Start: 2024-08-14

## 2024-08-15 DIAGNOSIS — E11.65 TYPE 2 DIABETES MELLITUS WITH HYPERGLYCEMIA, WITH LONG-TERM CURRENT USE OF INSULIN: Chronic | ICD-10-CM

## 2024-08-15 DIAGNOSIS — Z79.4 TYPE 2 DIABETES MELLITUS WITH HYPERGLYCEMIA, WITH LONG-TERM CURRENT USE OF INSULIN: Chronic | ICD-10-CM

## 2024-08-16 RX ORDER — INSULIN DEGLUDEC 100 U/ML
INJECTION, SOLUTION SUBCUTANEOUS
Qty: 9 ML | Refills: 1 | Status: SHIPPED | OUTPATIENT
Start: 2024-08-16

## 2024-08-20 DIAGNOSIS — I10 BENIGN ESSENTIAL HYPERTENSION: Chronic | ICD-10-CM

## 2024-08-20 DIAGNOSIS — Z79.4 TYPE 2 DIABETES MELLITUS WITH HYPERGLYCEMIA, WITH LONG-TERM CURRENT USE OF INSULIN: Chronic | ICD-10-CM

## 2024-08-20 DIAGNOSIS — E11.65 TYPE 2 DIABETES MELLITUS WITH HYPERGLYCEMIA, WITH LONG-TERM CURRENT USE OF INSULIN: Chronic | ICD-10-CM

## 2024-08-20 RX ORDER — LISINOPRIL 30 MG/1
30 TABLET ORAL DAILY
Qty: 90 TABLET | Refills: 1 | Status: SHIPPED | OUTPATIENT
Start: 2024-08-20

## 2024-09-07 DIAGNOSIS — Z79.4 TYPE 2 DIABETES MELLITUS WITHOUT COMPLICATION, WITH LONG-TERM CURRENT USE OF INSULIN: ICD-10-CM

## 2024-09-07 DIAGNOSIS — E11.9 TYPE 2 DIABETES MELLITUS WITHOUT COMPLICATION, WITH LONG-TERM CURRENT USE OF INSULIN: ICD-10-CM

## 2024-09-09 RX ORDER — PEN NEEDLE, DIABETIC 32GX 5/32"
NEEDLE, DISPOSABLE MISCELLANEOUS
Qty: 100 EACH | Refills: 0 | Status: SHIPPED | OUTPATIENT
Start: 2024-09-09

## 2024-10-10 DIAGNOSIS — E11.65 TYPE 2 DIABETES MELLITUS WITH HYPERGLYCEMIA, WITH LONG-TERM CURRENT USE OF INSULIN: Chronic | ICD-10-CM

## 2024-10-10 DIAGNOSIS — Z79.4 TYPE 2 DIABETES MELLITUS WITH HYPERGLYCEMIA, WITH LONG-TERM CURRENT USE OF INSULIN: Chronic | ICD-10-CM

## 2024-10-10 RX ORDER — BLOOD-GLUCOSE SENSOR
1 EACH MISCELLANEOUS
Qty: 2 EACH | Refills: 5 | Status: SHIPPED | OUTPATIENT
Start: 2024-10-10

## 2024-10-10 NOTE — TELEPHONE ENCOUNTER
Caller: Magui Dumont [email or web address]    Relationship: Self    Best call back number: [phone]     Requested Prescriptions:   Requested Prescriptions     Pending Prescriptions Disp Refills    Continuous Glucose Sensor (FreeStyle Milena 3 Sensor) misc 2 each 5     Sig: Use 1 Units Every 14 (Fourteen) Days.        Pharmacy where request should be sent: [address] [phone] [facility] [phone]      Last office visit with prescribing clinician: 7/10/2024   Last telemedicine visit with prescribing clinician: Visit date not found   Next office visit with prescribing clinician: 11/13/2024     Additional details provided by patient: PATIENT HAS BEEN WITHOUT THIS FOR 2 WEEKS    Does the patient have less than a 3 day supply:  [x] Yes  [] No    Would you like a call back once the refill request has been completed: [] Yes [] No    If the office needs to give you a call back, can they leave a voicemail: [] Yes [] No    Octavia Zavala Rep   10/10/24 10:39 EDT

## 2024-10-25 DIAGNOSIS — Z79.4 TYPE 2 DIABETES MELLITUS WITH HYPERGLYCEMIA, WITH LONG-TERM CURRENT USE OF INSULIN: ICD-10-CM

## 2024-10-25 DIAGNOSIS — E11.65 TYPE 2 DIABETES MELLITUS WITH HYPERGLYCEMIA, WITH LONG-TERM CURRENT USE OF INSULIN: ICD-10-CM

## 2024-10-25 RX ORDER — TIRZEPATIDE 5 MG/.5ML
INJECTION, SOLUTION SUBCUTANEOUS
Qty: 4 ML | Refills: 0 | Status: SHIPPED | OUTPATIENT
Start: 2024-10-25

## 2024-11-10 DIAGNOSIS — E03.9 HYPOTHYROIDISM, UNSPECIFIED TYPE: ICD-10-CM

## 2024-11-10 DIAGNOSIS — E55.9 VITAMIN D DEFICIENCY: ICD-10-CM

## 2024-11-10 DIAGNOSIS — I10 BENIGN ESSENTIAL HYPERTENSION: Chronic | ICD-10-CM

## 2024-11-10 DIAGNOSIS — E78.5 DYSLIPIDEMIA: Chronic | ICD-10-CM

## 2024-11-10 DIAGNOSIS — Z79.4 TYPE 2 DIABETES MELLITUS WITHOUT COMPLICATION, WITH LONG-TERM CURRENT USE OF INSULIN: ICD-10-CM

## 2024-11-10 DIAGNOSIS — E11.9 TYPE 2 DIABETES MELLITUS WITHOUT COMPLICATION, WITH LONG-TERM CURRENT USE OF INSULIN: ICD-10-CM

## 2024-11-11 RX ORDER — ERGOCALCIFEROL 1.25 MG/1
50000 CAPSULE, LIQUID FILLED ORAL WEEKLY
Qty: 13 CAPSULE | Refills: 0 | Status: SHIPPED | OUTPATIENT
Start: 2024-11-11

## 2024-11-13 ENCOUNTER — OFFICE VISIT (OUTPATIENT)
Dept: INTERNAL MEDICINE | Age: 69
End: 2024-11-13
Payer: MEDICARE

## 2024-11-13 VITALS
DIASTOLIC BLOOD PRESSURE: 60 MMHG | SYSTOLIC BLOOD PRESSURE: 110 MMHG | TEMPERATURE: 97 F | WEIGHT: 151 LBS | HEART RATE: 88 BPM | OXYGEN SATURATION: 98 % | HEIGHT: 66 IN | BODY MASS INDEX: 24.27 KG/M2

## 2024-11-13 DIAGNOSIS — F32.A DEPRESSION, UNSPECIFIED DEPRESSION TYPE: Chronic | ICD-10-CM

## 2024-11-13 DIAGNOSIS — Z79.4 TYPE 2 DIABETES MELLITUS WITH HYPERGLYCEMIA, WITH LONG-TERM CURRENT USE OF INSULIN: Primary | Chronic | ICD-10-CM

## 2024-11-13 DIAGNOSIS — E78.5 DYSLIPIDEMIA: Chronic | ICD-10-CM

## 2024-11-13 DIAGNOSIS — E11.65 TYPE 2 DIABETES MELLITUS WITH HYPERGLYCEMIA, WITH LONG-TERM CURRENT USE OF INSULIN: Primary | Chronic | ICD-10-CM

## 2024-11-13 DIAGNOSIS — E06.3 HYPOTHYROIDISM DUE TO HASHIMOTO THYROIDITIS: Chronic | ICD-10-CM

## 2024-11-13 DIAGNOSIS — I10 BENIGN ESSENTIAL HYPERTENSION: Chronic | ICD-10-CM

## 2024-11-13 LAB
ALBUMIN SERPL-MCNC: 4.3 G/DL (ref 3.5–5.2)
ALBUMIN/GLOB SERPL: 2 G/DL
ALP SERPL-CCNC: 73 U/L (ref 39–117)
ALT SERPL-CCNC: 20 U/L (ref 1–33)
AST SERPL-CCNC: 21 U/L (ref 1–32)
BILIRUB SERPL-MCNC: 0.5 MG/DL (ref 0–1.2)
BUN SERPL-MCNC: 10 MG/DL (ref 8–23)
BUN/CREAT SERPL: 16.9 (ref 7–25)
CALCIUM SERPL-MCNC: 9.2 MG/DL (ref 8.6–10.5)
CHLORIDE SERPL-SCNC: 103 MMOL/L (ref 98–107)
CO2 SERPL-SCNC: 26.8 MMOL/L (ref 22–29)
CREAT SERPL-MCNC: 0.59 MG/DL (ref 0.57–1)
EGFRCR SERPLBLD CKD-EPI 2021: 97.1 ML/MIN/1.73
GLOBULIN SER CALC-MCNC: 2.1 GM/DL
GLUCOSE SERPL-MCNC: 93 MG/DL (ref 65–99)
HBA1C MFR BLD: 6.9 % (ref 4.8–5.6)
POTASSIUM SERPL-SCNC: 4.3 MMOL/L (ref 3.5–5.2)
PROT SERPL-MCNC: 6.4 G/DL (ref 6–8.5)
SODIUM SERPL-SCNC: 138 MMOL/L (ref 136–145)
T4 FREE SERPL-MCNC: 1.37 NG/DL (ref 0.92–1.68)
TSH SERPL DL<=0.005 MIU/L-ACNC: 2.54 UIU/ML (ref 0.27–4.2)

## 2024-11-13 PROCEDURE — 99214 OFFICE O/P EST MOD 30 MIN: CPT | Performed by: INTERNAL MEDICINE

## 2024-11-13 PROCEDURE — G2211 COMPLEX E/M VISIT ADD ON: HCPCS | Performed by: INTERNAL MEDICINE

## 2024-11-13 PROCEDURE — 3051F HG A1C>EQUAL 7.0%<8.0%: CPT | Performed by: INTERNAL MEDICINE

## 2024-11-13 PROCEDURE — 1126F AMNT PAIN NOTED NONE PRSNT: CPT | Performed by: INTERNAL MEDICINE

## 2024-11-13 PROCEDURE — 3074F SYST BP LT 130 MM HG: CPT | Performed by: INTERNAL MEDICINE

## 2024-11-13 PROCEDURE — 3078F DIAST BP <80 MM HG: CPT | Performed by: INTERNAL MEDICINE

## 2024-11-13 NOTE — ASSESSMENT & PLAN NOTE
Lab Results   Component Value Date    HGBA1C 7.00 (H) 07/08/2024    HGBA1C 8.60 (H) 04/09/2024    HGBA1C 8.00 (H) 09/13/2023    CREATININE 0.61 12/12/2023    LDL 79 04/09/2024    MALBCRERATIO <9 03/30/2021      Continue Mounjaro 5 mg weekly, Tresiba 30 units and metformin 1000 mg BID   Prescription for Milena 2 system (Milena 3 sensors are not available at pharmacy)

## 2024-11-13 NOTE — PROGRESS NOTES
J  U  N  O  H    K  I  M ,   M  D                  I  N  T  E  R  N  A  L    M  E  D  I  C  I  N  E         ENCOUNTER DATE:  11/13/2024    Magui Dumont / 69 y.o. / female    OFFICE VISIT ENCOUNTER       CHIEF COMPLAINT / REASON FOR OFFICE VISIT     Diabetes and Hypertension      ASSESSMENT & PLAN     Problem List Items Addressed This Visit          High    Type 2 diabetes mellitus with hyperglycemia, with long-term current use of insulin - Primary (Chronic)    Overview     * Previously managed by endocrinologist (Bartolo).         Current Assessment & Plan     Lab Results   Component Value Date    HGBA1C 7.00 (H) 07/08/2024    HGBA1C 8.60 (H) 04/09/2024    HGBA1C 8.00 (H) 09/13/2023    CREATININE 0.61 12/12/2023    LDL 79 04/09/2024    MALBCRERATIO <9 03/30/2021      Continue Mounjaro 5 mg weekly, Tresiba 30 units and metformin 1000 mg BID   Prescription for Milena 2 system (Milena 3 sensors are not available at pharmacy)         Relevant Medications    glucose blood test strip    metFORMIN (GLUCOPHAGE) 1000 MG tablet    Tresiba FlexTouch 100 UNIT/ML solution pen-injector injection    lisinopril (PRINIVIL,ZESTRIL) 30 MG tablet    ReliOn Pen Needles 32G X 4 MM misc    Continuous Glucose Sensor (FreeStyle Milena 3 Sensor) misc    Mounjaro 5 MG/0.5ML solution pen-injector pen    vitamin D (ERGOCALCIFEROL) 1.25 MG (31243 UT) capsule capsule    Continuous Glucose Sensor (FreeStyle Milena 2 Sensor) misc    Continuous Glucose  (FreeStyle Milena 2 [place]) device    Other Relevant Orders    Hemoglobin A1c    Comprehensive Metabolic Panel       Medium    Benign essential hypertension (Chronic)    Overview     Continue lisinopril 30 mg daily         Relevant Medications    lisinopril (PRINIVIL,ZESTRIL) 30 MG tablet    vitamin D (ERGOCALCIFEROL) 1.25 MG (54893 UT) capsule capsule    Dyslipidemia (Chronic)    Overview     Continue rosuvastatin 20 mg nightly.          Current Assessment & Plan      Lab Results   Component Value Date    LDL 79 04/09/2024    LDL 80 07/20/2022    LDL 68 03/30/2021    TRIG 140 04/09/2024    CHOLHDLRATIO 3.26 04/09/2024      Continue rosuvastatin 20 mg.          Relevant Medications    rosuvastatin (CRESTOR) 20 MG tablet    vitamin D (ERGOCALCIFEROL) 1.25 MG (42063 UT) capsule capsule    Hypothyroidism (Chronic)    Overview     Continue levothyroxine 100 mcg         Current Assessment & Plan     Lab Results   Component Value Date    TSH 1.160 07/20/2022    TSH 2.200 08/11/2021    TSH 0.252 (L) 03/30/2021    FREET4 1.52 07/20/2022    FREET4 1.63 08/11/2021    FREET4 1.95 (H) 10/06/2020      Continue levothyroxine 100 mcg. Check lab today.          Relevant Medications    levothyroxine (SYNTHROID, LEVOTHROID) 100 MCG tablet    vitamin D (ERGOCALCIFEROL) 1.25 MG (42722 UT) capsule capsule    Other Relevant Orders    TSH+Free T4       Low    Depression (Chronic)    Overview     Continue citalopram 20 mg qd.          Relevant Medications    citalopram (CeleXA) 20 MG tablet     Orders Placed This Encounter   Procedures    TSH+Free T4     Order Specific Question:   Release to patient     Answer:   Routine Release [[id number]]    Hemoglobin A1c     Order Specific Question:   Release to patient     Answer:   Routine Release [[id number]]    Comprehensive Metabolic Panel     Order Specific Question:   Release to patient     Answer:   Routine Release [[id number]]     New Medications Ordered This Visit   Medications    Continuous Glucose Sensor (FreeStyle Milena 2 Sensor) misc     Sig: Apply 1 Device topically Every 14 (Fourteen) Days.     Dispense:  2 each     Refill:  5    Continuous Glucose  (FreeStyle Milena 2 [place]) device     Sig: Use 1 Device As Needed (for glucose check).     Dispense:  1 each     Refill:  0       SUMMARY/DISCUSSION        TOTAL TIME OF ENCOUNTER:      Next Appointment with me: Visit date not found    Return in about 6 months (around 5/13/2025) for Reassess  "chronic medical problems.      VITAL SIGNS     Vitals:    11/13/24 1139   BP: 110/60   Pulse: 88   Temp: 97 °F (36.1 °C)   SpO2: 98%   Weight: 68.5 kg (151 lb)   Height: 166.4 cm (65.5\")       BP Readings from Last 3 Encounters:   11/13/24 110/60   07/10/24 110/70   04/09/24 120/70     Wt Readings from Last 3 Encounters:   11/13/24 68.5 kg (151 lb)   07/10/24 68.5 kg (151 lb)   04/09/24 71.2 kg (157 lb)     Body mass index is 24.75 kg/m².    Blood pressure readings recorded on patient flowsheet:       No data to display                MEDICATIONS AT THE TIME OF OFFICE VISIT     Current Outpatient Medications on File Prior to Visit   Medication Sig    citalopram (CeleXA) 20 MG tablet Take 1 tablet by mouth once daily    Continuous Glucose Sensor (FreeStyle Milena 3 Sensor) misc Use 1 Units Every 14 (Fourteen) Days.    docusate sodium (COLACE) 100 MG capsule Take 1 capsule by mouth Every 12 (Twelve) Hours.    glucose blood test strip OneTouch Verio In Vitro Strip; Patient Sig: CHECK BLOOD GLUCOSE 1 TIME A DAY; E11.65    Hydrocortisone, Perianal, (Proctosol HC) 2.5 % rectal cream Insert  into the rectum 2 (Two) Times a Day.    levothyroxine (SYNTHROID, LEVOTHROID) 100 MCG tablet Take 1 tablet by mouth once daily    lisinopril (PRINIVIL,ZESTRIL) 30 MG tablet Take 1 tablet by mouth Daily.    metFORMIN (GLUCOPHAGE) 1000 MG tablet TAKE 1 TABLET BY MOUTH TWICE DAILY WITH MEALS    Mounjaro 5 MG/0.5ML solution pen-injector pen INJECT 1/2 (ONE-HALF) ML SUBCUTANEOUSLY  ONCE A WEEK AS DIRECTED    nystatin [id number] UNIT/GM powder APPLY  POWDER TOPICALLY TO AFFECTED AREA AS DIRECTED TWICE DAILY (Patient taking differently: Apply 1 Application topically to the appropriate area as directed 2 (Two) Times a Day As Needed (TO RED AREA  AS NEEDED).)    Omega-3 Fatty Acids (FISH OIL) 1000 MG capsule delayed-release Take 1 capsule by mouth Daily. HOLD ONE WEEK FOR SURGERY    omeprazole (priLOSEC) 20 MG capsule Take 1 capsule by mouth Daily. "    ONETOUCH DELICA LANCETS FINE misc     ReliOn Pen Needles 32G X 4 MM misc USE 1 TO INJECT ONCE DAILY    rosuvastatin (CRESTOR) 20 MG tablet TAKE 1 TABLET BY MOUTH ONCE DAILY AT NIGHT    Tresiba FlexTouch 100 UNIT/ML solution pen-injector injection INJECT 30 UNITS SUBCUTANEOUSLY ONCE DAILY AS DIRECTED    vitamin D (ERGOCALCIFEROL) 1.25 MG (12198 UT) capsule capsule Take 1 capsule by mouth once a week     No current facility-administered medications on file prior to visit.         HISTORY OF PRESENT ILLNESS     Patient denies any significant changes or problems.  Patient complains of difficulty obtaining freestyle julia 3 sensors at her pharmacy and would like to switch to julia 3 system.  She has not been able to check her blood sugar recently.  Most recent A1c in July improved to 7.0.  She is currently on metformin 1000 mg twice daily, Mounjaro 5 mg weekly along with Tresiba insulin 30 units daily.  She denies symptoms of hypoglycemia.  Her weight remains stable.  Hypertension is controlled on lisinopril 20 mg daily.  She is on levothyroxine 100 mcg for hypothyroidism.  She is on rosuvastatin 20 mg for hyperlipidemia without significant side effects.  Most recent LDL was 79.    Lab Results   Component Value Date    HGBA1C 7.00 (H) 07/08/2024    HGBA1C 8.60 (H) 04/09/2024    HGBA1C 8.00 (H) 09/13/2023    CREATININE 0.61 12/12/2023    LDL 79 04/09/2024    MALBCRERATIO <9 03/30/2021     Blood sugar readings recorded on patient's flowsheet:       No data to display               68.5 kg (151 lb)    Patient Care Team:  Venkat Brown MD as PCP - General (Internal Medicine)  Harsh Velasco MD as Consulting Physician (Obstetrics and Gynecology)    REVIEW OF SYSTEMS           PHYSICAL EXAMINATION     Physical Exam  General: No acute distress.   Psych: Normal thought and judgment.   Cardiovascular Rate: normal. Rhythm: regular. Heart sounds: normal.    Pulm/Chest: Effort normal, breath sounds normal.       REVIEWED  DATA     Labs:       Lab Results   Component Value Date     12/12/2023    K 3.7 12/12/2023    CALCIUM 9.5 12/12/2023    AST 25 11/15/2021    ALT 26 11/15/2021    BUN 9 12/12/2023    CREATININE 0.61 12/12/2023    CREATININE 0.80 08/09/2023    CREATININE 0.65 02/17/2023    EGFRRESULT 84 05/16/2022     Lab Results   Component Value Date    HGBA1C 7.00 (H) 07/08/2024    HGBA1C 8.60 (H) 04/09/2024    HGBA1C 8.00 (H) 09/13/2023     Lab Results   Component Value Date    LDL 79 04/09/2024    LDL 80 07/20/2022    LDL 68 03/30/2021    HDL 46 04/09/2024    HDL 42 07/20/2022    TRIG 140 04/09/2024    TRIG 131 07/20/2022     Lab Results   Component Value Date    TSH 1.160 07/20/2022    TSH 2.200 08/11/2021    TSH 0.252 (L) 03/30/2021    FREET4 1.52 07/20/2022    FREET4 1.63 08/11/2021    FREET4 1.95 (H) 10/06/2020     Lab Results   Component Value Date    WBC 7.37 12/12/2023    HGB 13.0 12/12/2023     12/12/2023     Lab Results   Component Value Date    MALBCRERATIO <9 03/30/2021         Imaging:           Medical Tests:           Summary of old records / correspondence / consultant report:           Request outside records:

## 2024-11-13 NOTE — ASSESSMENT & PLAN NOTE
Lab Results   Component Value Date    TSH 1.160 07/20/2022    TSH 2.200 08/11/2021    TSH 0.252 (L) 03/30/2021    FREET4 1.52 07/20/2022    FREET4 1.63 08/11/2021    FREET4 1.95 (H) 10/06/2020      Continue levothyroxine 100 mcg. Check lab today.

## 2024-11-13 NOTE — ASSESSMENT & PLAN NOTE
Lab Results   Component Value Date    LDL 79 04/09/2024    LDL 80 07/20/2022    LDL 68 03/30/2021    TRIG 140 04/09/2024    CHOLHDLRATIO 3.26 04/09/2024      Continue rosuvastatin 20 mg.

## 2024-11-14 NOTE — PROGRESS NOTES
A1c level for blood sugar average is continuing to improve.    Labs for kidney, liver and electrolytes are stable (i.e.  normal, stable, improved or without clinically significant worsening)     Thyroid level is stable (or normal).

## 2024-11-20 DIAGNOSIS — E11.65 TYPE 2 DIABETES MELLITUS WITH HYPERGLYCEMIA, WITH LONG-TERM CURRENT USE OF INSULIN: ICD-10-CM

## 2024-11-20 DIAGNOSIS — Z79.4 TYPE 2 DIABETES MELLITUS WITH HYPERGLYCEMIA, WITH LONG-TERM CURRENT USE OF INSULIN: ICD-10-CM

## 2024-11-20 RX ORDER — TIRZEPATIDE 5 MG/.5ML
INJECTION, SOLUTION SUBCUTANEOUS
Qty: 4 ML | Refills: 0 | Status: SHIPPED | OUTPATIENT
Start: 2024-11-20

## 2024-12-03 DIAGNOSIS — E11.65 TYPE 2 DIABETES MELLITUS WITH HYPERGLYCEMIA, WITH LONG-TERM CURRENT USE OF INSULIN: ICD-10-CM

## 2024-12-03 DIAGNOSIS — Z79.4 TYPE 2 DIABETES MELLITUS WITH HYPERGLYCEMIA, WITH LONG-TERM CURRENT USE OF INSULIN: ICD-10-CM

## 2024-12-15 DIAGNOSIS — Z79.4 TYPE 2 DIABETES MELLITUS WITH HYPERGLYCEMIA, WITH LONG-TERM CURRENT USE OF INSULIN: ICD-10-CM

## 2024-12-15 DIAGNOSIS — E11.65 TYPE 2 DIABETES MELLITUS WITH HYPERGLYCEMIA, WITH LONG-TERM CURRENT USE OF INSULIN: ICD-10-CM

## 2024-12-16 DIAGNOSIS — Z79.4 TYPE 2 DIABETES MELLITUS WITHOUT COMPLICATION, WITH LONG-TERM CURRENT USE OF INSULIN: ICD-10-CM

## 2024-12-16 DIAGNOSIS — E11.9 TYPE 2 DIABETES MELLITUS WITHOUT COMPLICATION, WITH LONG-TERM CURRENT USE OF INSULIN: ICD-10-CM

## 2024-12-16 RX ORDER — TIRZEPATIDE 5 MG/.5ML
INJECTION, SOLUTION SUBCUTANEOUS
Qty: 6 ML | Refills: 0 | Status: SHIPPED | OUTPATIENT
Start: 2024-12-16

## 2024-12-16 RX ORDER — PEN NEEDLE, DIABETIC 32GX 5/32"
NEEDLE, DISPOSABLE MISCELLANEOUS
Qty: 100 EACH | Refills: 2 | Status: SHIPPED | OUTPATIENT
Start: 2024-12-16

## 2025-01-03 DIAGNOSIS — E78.5 DYSLIPIDEMIA: ICD-10-CM

## 2025-01-09 RX ORDER — ROSUVASTATIN CALCIUM 20 MG/1
20 TABLET, COATED ORAL NIGHTLY
Qty: 90 TABLET | Refills: 1 | Status: SHIPPED | OUTPATIENT
Start: 2025-01-09

## 2025-01-21 DIAGNOSIS — E03.9 HYPOTHYROIDISM, UNSPECIFIED TYPE: ICD-10-CM

## 2025-01-21 RX ORDER — LEVOTHYROXINE SODIUM 100 UG/1
TABLET ORAL
Qty: 90 TABLET | Refills: 1 | Status: SHIPPED | OUTPATIENT
Start: 2025-01-21

## 2025-02-06 DIAGNOSIS — E78.5 DYSLIPIDEMIA: Chronic | ICD-10-CM

## 2025-02-06 DIAGNOSIS — I10 BENIGN ESSENTIAL HYPERTENSION: Chronic | ICD-10-CM

## 2025-02-06 DIAGNOSIS — Z79.4 TYPE 2 DIABETES MELLITUS WITHOUT COMPLICATION, WITH LONG-TERM CURRENT USE OF INSULIN: ICD-10-CM

## 2025-02-06 DIAGNOSIS — E03.9 HYPOTHYROIDISM, UNSPECIFIED TYPE: ICD-10-CM

## 2025-02-06 DIAGNOSIS — E55.9 VITAMIN D DEFICIENCY: ICD-10-CM

## 2025-02-06 DIAGNOSIS — E11.9 TYPE 2 DIABETES MELLITUS WITHOUT COMPLICATION, WITH LONG-TERM CURRENT USE OF INSULIN: ICD-10-CM

## 2025-02-06 RX ORDER — ERGOCALCIFEROL 1.25 MG/1
50000 CAPSULE, LIQUID FILLED ORAL WEEKLY
Qty: 13 CAPSULE | Refills: 1 | Status: SHIPPED | OUTPATIENT
Start: 2025-02-06

## 2025-02-11 DIAGNOSIS — Z79.4 TYPE 2 DIABETES MELLITUS WITH HYPERGLYCEMIA, WITH LONG-TERM CURRENT USE OF INSULIN: Chronic | ICD-10-CM

## 2025-02-11 DIAGNOSIS — E11.65 TYPE 2 DIABETES MELLITUS WITH HYPERGLYCEMIA, WITH LONG-TERM CURRENT USE OF INSULIN: Chronic | ICD-10-CM

## 2025-02-11 DIAGNOSIS — I10 BENIGN ESSENTIAL HYPERTENSION: Chronic | ICD-10-CM

## 2025-02-12 RX ORDER — LISINOPRIL 30 MG/1
30 TABLET ORAL DAILY
Qty: 90 TABLET | Refills: 1 | Status: SHIPPED | OUTPATIENT
Start: 2025-02-12

## 2025-02-26 ENCOUNTER — APPOINTMENT (OUTPATIENT)
Dept: WOMENS IMAGING | Facility: HOSPITAL | Age: 70
End: 2025-02-26
Payer: MEDICARE

## 2025-02-26 PROCEDURE — 77063 BREAST TOMOSYNTHESIS BI: CPT | Performed by: RADIOLOGY

## 2025-02-26 PROCEDURE — 77067 SCR MAMMO BI INCL CAD: CPT | Performed by: RADIOLOGY

## 2025-03-05 DIAGNOSIS — Z79.4 TYPE 2 DIABETES MELLITUS WITH HYPERGLYCEMIA, WITH LONG-TERM CURRENT USE OF INSULIN: ICD-10-CM

## 2025-03-05 DIAGNOSIS — E11.65 TYPE 2 DIABETES MELLITUS WITH HYPERGLYCEMIA, WITH LONG-TERM CURRENT USE OF INSULIN: ICD-10-CM

## 2025-03-05 RX ORDER — TIRZEPATIDE 5 MG/.5ML
INJECTION, SOLUTION SUBCUTANEOUS
Qty: 6 ML | Refills: 0 | Status: SHIPPED | OUTPATIENT
Start: 2025-03-05

## 2025-03-27 DIAGNOSIS — F32.A DEPRESSION, UNSPECIFIED DEPRESSION TYPE: ICD-10-CM

## 2025-03-27 RX ORDER — CITALOPRAM HYDROBROMIDE 20 MG/1
20 TABLET ORAL DAILY
Qty: 90 TABLET | Refills: 1 | Status: SHIPPED | OUTPATIENT
Start: 2025-03-27

## 2025-05-18 DIAGNOSIS — Z79.4 TYPE 2 DIABETES MELLITUS WITH HYPERGLYCEMIA, WITH LONG-TERM CURRENT USE OF INSULIN: ICD-10-CM

## 2025-05-18 DIAGNOSIS — E11.65 TYPE 2 DIABETES MELLITUS WITH HYPERGLYCEMIA, WITH LONG-TERM CURRENT USE OF INSULIN: ICD-10-CM

## 2025-05-19 RX ORDER — TIRZEPATIDE 5 MG/.5ML
INJECTION, SOLUTION SUBCUTANEOUS
Qty: 12 ML | Refills: 0 | OUTPATIENT
Start: 2025-05-19

## 2025-05-23 RX ORDER — TIRZEPATIDE 5 MG/.5ML
5 INJECTION, SOLUTION SUBCUTANEOUS WEEKLY
Qty: 6 ML | Refills: 0 | Status: SHIPPED | OUTPATIENT
Start: 2025-05-23

## 2025-05-26 DIAGNOSIS — Z79.4 TYPE 2 DIABETES MELLITUS WITH HYPERGLYCEMIA, WITH LONG-TERM CURRENT USE OF INSULIN: ICD-10-CM

## 2025-05-26 DIAGNOSIS — E11.65 TYPE 2 DIABETES MELLITUS WITH HYPERGLYCEMIA, WITH LONG-TERM CURRENT USE OF INSULIN: ICD-10-CM

## 2025-06-20 ENCOUNTER — OFFICE VISIT (OUTPATIENT)
Dept: INTERNAL MEDICINE | Age: 70
End: 2025-06-20
Payer: MEDICARE

## 2025-06-20 VITALS
OXYGEN SATURATION: 97 % | TEMPERATURE: 96.8 F | BODY MASS INDEX: 23.95 KG/M2 | HEIGHT: 66 IN | WEIGHT: 149 LBS | HEART RATE: 102 BPM | DIASTOLIC BLOOD PRESSURE: 78 MMHG | SYSTOLIC BLOOD PRESSURE: 120 MMHG

## 2025-06-20 DIAGNOSIS — E06.3 HYPOTHYROIDISM DUE TO HASHIMOTO THYROIDITIS: Chronic | ICD-10-CM

## 2025-06-20 DIAGNOSIS — Z79.4 TYPE 2 DIABETES MELLITUS WITH HYPERGLYCEMIA, WITH LONG-TERM CURRENT USE OF INSULIN: Primary | Chronic | ICD-10-CM

## 2025-06-20 DIAGNOSIS — S80.869A TICK BITE OF LOWER LEG, UNSPECIFIED LATERALITY, INITIAL ENCOUNTER: ICD-10-CM

## 2025-06-20 DIAGNOSIS — F32.A DEPRESSION, UNSPECIFIED DEPRESSION TYPE: Chronic | ICD-10-CM

## 2025-06-20 DIAGNOSIS — E78.5 DYSLIPIDEMIA: Chronic | ICD-10-CM

## 2025-06-20 DIAGNOSIS — E11.65 TYPE 2 DIABETES MELLITUS WITH HYPERGLYCEMIA, WITH LONG-TERM CURRENT USE OF INSULIN: Primary | Chronic | ICD-10-CM

## 2025-06-20 DIAGNOSIS — K64.9 HEMORRHOIDS, UNSPECIFIED HEMORRHOID TYPE: ICD-10-CM
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
## 2025-06-20 DIAGNOSIS — I10 BENIGN ESSENTIAL HYPERTENSION: Chronic | ICD-10-CM

## 2025-06-20 RX ORDER — DOXYCYCLINE HYCLATE 100 MG
100 TABLET ORAL 2 TIMES DAILY
Qty: 20 TABLET | Refills: 0 | Status: SHIPPED | OUTPATIENT
Start: 2025-06-20 | End: 2025-06-30

## 2025-06-20 RX ORDER — INSULIN DEGLUDEC 100 U/ML
30 INJECTION, SOLUTION SUBCUTANEOUS DAILY
Qty: 9 ML | Refills: 1 | Status: SHIPPED | OUTPATIENT
Start: 2025-06-20

## 2025-06-20 RX ORDER — TIRZEPATIDE 5 MG/.5ML
5 INJECTION, SOLUTION SUBCUTANEOUS WEEKLY
Qty: 6 ML | Refills: 0 | Status: SHIPPED | OUTPATIENT
Start: 2025-06-20

## 2025-06-20 RX ORDER — HYDROCORTISONE 25 MG/G
CREAM TOPICAL 2 TIMES DAILY
Qty: 28 G | Refills: 2 | Status: SHIPPED | OUTPATIENT
Start: 2025-06-20

## 2025-06-20 RX ORDER — ACYCLOVIR 800 MG/1
1 TABLET ORAL
Qty: 2 EACH | Refills: 5 | Status: SHIPPED | OUTPATIENT
Start: 2025-06-20

## 2025-06-20 NOTE — PROGRESS NOTES
J  U  N  O  H    K  I  M ,   M  D       I  N  T  E  R  N  A  L    M  E  D  I  C  I  N  E         ENCOUNTER DATE:  06/20/2025    Magui Dumont / 69 y.o. / female    OFFICE VISIT ENCOUNTER       CHIEF COMPLAINT / REASON FOR OFFICE VISIT     Diabetes      ASSESSMENT & PLAN     Problem List Items Addressed This Visit          High    Type 2 diabetes mellitus with hyperglycemia, with long-term current use of insulin - Primary (Chronic)    Overview   * Previously managed by endocrinologist (Bartolo).         Current Assessment & Plan   Lab Results   Component Value Date    HGBA1C 6.90 (H) 11/13/2024    HGBA1C 7.00 (H) 07/08/2024    HGBA1C 8.60 (H) 04/09/2024    CREATININE 0.59 11/13/2024    LDL 79 04/09/2024    MALBCRERATIO <9 03/30/2021      Check labs today. Continue same medications.          Relevant Medications    glucose blood test strip    Continuous Glucose  (FreeStyle Milena 2 [place]) device    ReliOn Pen Needles 32G X 4 MM misc    vitamin D (ERGOCALCIFEROL) 1.25 MG (15479 UT) capsule capsule    lisinopril (PRINIVIL,ZESTRIL) 30 MG tablet    metFORMIN (GLUCOPHAGE) 1000 MG tablet    Continuous Glucose Sensor (FreeStyle Milena 3 Sensor) misc    Tirzepatide (Mounjaro) 5 MG/0.5ML solution auto-injector    Tresiba FlexTouch 100 UNIT/ML solution pen-injector injection    Other Relevant Orders    Comprehensive Metabolic Panel    Hemoglobin A1c    Microalbumin / Creatinine Urine Ratio - Urine, Clean Catch       Medium    Benign essential hypertension (Chronic)    Overview   Continue lisinopril 30 mg daily         Relevant Medications    vitamin D (ERGOCALCIFEROL) 1.25 MG (75815 UT) capsule capsule    lisinopril (PRINIVIL,ZESTRIL) 30 MG tablet    Depression (Chronic)    Overview   Continue citalopram 20 mg qd.          Relevant Medications    citalopram (CeleXA) 20 MG tablet    Dyslipidemia (Chronic)    Overview   Continue rosuvastatin 20 mg nightly.          Relevant Medications    rosuvastatin (CRESTOR) 20 MG  tablet    vitamin D (ERGOCALCIFEROL) 1.25 MG (39971 UT) capsule capsule    Other Relevant Orders    Comprehensive Metabolic Panel    Lipid Panel With / Chol / HDL Ratio    Hypothyroidism (Chronic)    Overview   Continue levothyroxine 100 mcg         Relevant Medications    levothyroxine (SYNTHROID, LEVOTHROID) 100 MCG tablet    vitamin D (ERGOCALCIFEROL) 1.25 MG (62772 UT) capsule capsule    Other Relevant Orders    TSH+Free T4     Other Visit Diagnoses         Tick bite of lower leg, unspecified laterality, initial encounter        Relevant Orders    Lyme Disease Total Antibody With Reflex to Immunoassay      Hemorrhoids, unspecified hemorrhoid type        Relevant Medications    Hydrocortisone, Perianal, (Proctosol HC) 2.5 % rectal cream          Orders Placed This Encounter   Procedures    Comprehensive Metabolic Panel     Release to patient:   Routine Release [[id number]]    Lipid Panel With / Chol / HDL Ratio     Release to patient:   Routine Release [[id number]]    Hemoglobin A1c     Release to patient:   Routine Release [[id number]]    Microalbumin / Creatinine Urine Ratio - Urine, Clean Catch     Release to patient:   Routine Release [[id number]]    TSH+Free T4     Release to patient:   Routine Release [[id number]]    Lyme Disease Total Antibody With Reflex to Immunoassay     Release to patient:   Routine Release [[id number]]     New Medications Ordered This Visit   Medications    Continuous Glucose Sensor (FreeStyle Milena 3 Sensor) misc     Sig: Use 1 Units Every 14 (Fourteen) Days.     Dispense:  2 each     Refill:  5    Hydrocortisone, Perianal, (Proctosol HC) 2.5 % rectal cream     Sig: Insert  into the rectum 2 (Two) Times a Day.     Dispense:  28 g     Refill:  2    Tirzepatide (Mounjaro) 5 MG/0.5ML solution auto-injector     Sig: Inject 5 mg under the skin into the appropriate area as directed 1 (One) Time Per Week.     Dispense:  6 mL     Refill:  0    Tresiba FlexTouch 100 UNIT/ML solution  "pen-injector injection     Sig: Inject 30 Units under the skin into the appropriate area as directed Daily.     Dispense:  9 mL     Refill:  1       SUMMARY/DISCUSSION        TOTAL TIME OF ENCOUNTER:      Next Appointment with me: Visit date not found    Return in about 5 months (around 11/12/2025) for **SCHEDULE COMBINED AWV AND MEDICAL F/U**.      VITAL SIGNS     Vitals:    06/20/25 1341   BP: 120/78   Pulse: 102   Temp: 96.8 °F (36 °C)   SpO2: 97%   Weight: 67.6 kg (149 lb)   Height: 166.4 cm (65.5\")       BP Readings from Last 3 Encounters:   06/20/25 120/78   11/13/24 110/60   07/10/24 110/70     Wt Readings from Last 3 Encounters:   06/20/25 67.6 kg (149 lb)   11/13/24 68.5 kg (151 lb)   07/10/24 68.5 kg (151 lb)     Body mass index is 24.42 kg/m².    Blood pressure readings recorded on patient flowsheet:       No data to display                MEDICATIONS AT THE TIME OF OFFICE VISIT     Current Outpatient Medications on File Prior to Visit   Medication Sig    citalopram (CeleXA) 20 MG tablet Take 1 tablet by mouth once daily    Continuous Glucose  (FreeStyle Milena 2 [place]) device Use 1 Device As Needed (for glucose check).    glucose blood test strip OneTouch Verio In Vitro Strip; Patient Sig: CHECK BLOOD GLUCOSE 1 TIME A DAY; E11.65    levothyroxine (SYNTHROID, LEVOTHROID) 100 MCG tablet Take 1 tablet by mouth once daily    lisinopril (PRINIVIL,ZESTRIL) 30 MG tablet Take 1 tablet by mouth once daily    metFORMIN (GLUCOPHAGE) 1000 MG tablet TAKE 1 TABLET BY MOUTH TWICE DAILY WITH MEALS    nystatin [id number] UNIT/GM powder APPLY  POWDER TOPICALLY TO AFFECTED AREA AS DIRECTED TWICE DAILY (Patient taking differently: Apply 1 Application topically to the appropriate area as directed 2 (Two) Times a Day As Needed (TO RED AREA  AS NEEDED).)    Omega-3 Fatty Acids (FISH OIL) 1000 MG capsule delayed-release Take 1 capsule by mouth Daily. HOLD ONE WEEK FOR SURGERY    omeprazole (priLOSEC) 20 MG capsule Take 1 " capsule by mouth Daily.    ONETOUCH DELICA LANCETS FINE misc     ReliOn Pen Needles 32G X 4 MM misc USE 1 TO INJECT ONCE DAILY    rosuvastatin (CRESTOR) 20 MG tablet TAKE 1 TABLET BY MOUTH ONCE DAILY AT NIGHT    vitamin D (ERGOCALCIFEROL) 1.25 MG (47465 UT) capsule capsule Take 1 capsule by mouth once a week    [DISCONTINUED] Continuous Glucose Sensor (FreeStyle Milena 2 Sensor) misc Apply 1 Device topically Every 14 (Fourteen) Days.    [DISCONTINUED] Continuous Glucose Sensor (FreeStyle Milena 3 Sensor) misc Use 1 Units Every 14 (Fourteen) Days.    [DISCONTINUED] docusate sodium (COLACE) 100 MG capsule Take 1 capsule by mouth Every 12 (Twelve) Hours.    [DISCONTINUED] Hydrocortisone, Perianal, (Proctosol HC) 2.5 % rectal cream Insert  into the rectum 2 (Two) Times a Day.    [DISCONTINUED] Tirzepatide (Mounjaro) 5 MG/0.5ML solution auto-injector Inject 5 mg under the skin into the appropriate area as directed 1 (One) Time Per Week.    [DISCONTINUED] Tresiba FlexTouch 100 UNIT/ML solution pen-injector injection INJECT 30 UNITS SUBCUTANEOUSLY ONCE DAILY AS DIRECTED     No current facility-administered medications on file prior to visit.         HISTORY OF PRESENT ILLNESS     Patient denies any acute changes or problems.  She was bitten by a tick about a month ago which she found behind her knee region.  There was a small area of erythema but she did not experience any fever, headache, body aches or rash.  Diabetes has been improving on Mounjaro in addition to metformin and Tresiba insulin 25 units daily.  She denies significant GI side effects.  Hypertension and hyperlipidemia also remains stable on meds.    Lab Results   Component Value Date    HGBA1C 6.90 (H) 11/13/2024    HGBA1C 7.00 (H) 07/08/2024    HGBA1C 8.60 (H) 04/09/2024    CREATININE 0.59 11/13/2024    LDL 79 04/09/2024    MALBCRERATIO <9 03/30/2021     Blood sugar readings recorded on patient's flowsheet:       No data to display               67.6 kg (149  lb)    Patient Care Team:  Venkat Brown MD as PCP - General (Internal Medicine)  Harsh Velasco Sr., MD as Consulting Physician (Obstetrics and Gynecology)    REVIEW OF SYSTEMS           PHYSICAL EXAMINATION     Physical Exam  Alert with normal thought and judgment.   Small area of discoloration at the site of tick bite behind her knee cannot say I am      REVIEWED DATA     Labs:     Lab Results   Component Value Date     11/13/2024    K 4.3 11/13/2024    CALCIUM 9.2 11/13/2024    AST 21 11/13/2024    ALT 20 11/13/2024    BUN 10 11/13/2024    CREATININE 0.59 11/13/2024    CREATININE 0.61 12/12/2023    CREATININE 0.80 08/09/2023    EGFR 97.1 11/13/2024     Lab Results   Component Value Date    HGBA1C 6.90 (H) 11/13/2024    HGBA1C 7.00 (H) 07/08/2024    HGBA1C 8.60 (H) 04/09/2024     Lab Results   Component Value Date    MALBCRERATIO <9 03/30/2021     Lab Results   Component Value Date    LDL 79 04/09/2024    LDL 80 07/20/2022    LDL 68 03/30/2021    HDL 46 04/09/2024    HDL 42 07/20/2022    TRIG 140 04/09/2024    CHOLHDLRATIO 3.26 04/09/2024     Lab Results   Component Value Date    TSH 2.540 11/13/2024    TSH 1.160 07/20/2022    TSH 2.200 08/11/2021    FREET4 1.37 11/13/2024    FREET4 1.52 07/20/2022    FREET4 1.63 08/11/2021     Lab Results   Component Value Date    WBC 7.37 12/12/2023    HGB 13.0 12/12/2023     12/12/2023       Imaging:           Medical Tests:           Summary of old records / correspondence / consultant report:           Request outside records:

## 2025-06-20 NOTE — ASSESSMENT & PLAN NOTE
Lab Results   Component Value Date    HGBA1C 6.90 (H) 11/13/2024    HGBA1C 7.00 (H) 07/08/2024    HGBA1C 8.60 (H) 04/09/2024    CREATININE 0.59 11/13/2024    LDL 79 04/09/2024    MALBCRERATIO <9 03/30/2021      Check labs today. Continue same medications.

## 2025-06-21 LAB
ALBUMIN SERPL-MCNC: 4.6 G/DL (ref 3.5–5.2)
ALBUMIN/CREAT UR: <5 MG/G CREAT (ref 0–29)
ALBUMIN/GLOB SERPL: 2.1 G/DL
ALP SERPL-CCNC: 76 U/L (ref 39–117)
ALT SERPL-CCNC: 21 U/L (ref 1–33)
AST SERPL-CCNC: 20 U/L (ref 1–32)
B BURGDOR IGG+IGM SER QL IA: NEGATIVE
BILIRUB SERPL-MCNC: 0.7 MG/DL (ref 0–1.2)
BUN SERPL-MCNC: 12 MG/DL (ref 8–23)
BUN/CREAT SERPL: 17.4 (ref 7–25)
CALCIUM SERPL-MCNC: 9.8 MG/DL (ref 8.6–10.5)
CHLORIDE SERPL-SCNC: 100 MMOL/L (ref 98–107)
CHOLEST SERPL-MCNC: 133 MG/DL (ref 0–200)
CHOLEST/HDLC SERPL: 2.96 {RATIO}
CO2 SERPL-SCNC: 23.2 MMOL/L (ref 22–29)
CREAT SERPL-MCNC: 0.69 MG/DL (ref 0.57–1)
CREAT UR-MCNC: 66.3 MG/DL
EGFRCR SERPLBLD CKD-EPI 2021: 93.5 ML/MIN/1.73
GLOBULIN SER CALC-MCNC: 2.2 GM/DL
GLUCOSE SERPL-MCNC: 80 MG/DL (ref 65–99)
HBA1C MFR BLD: 6.9 % (ref 4.8–5.6)
HDLC SERPL-MCNC: 45 MG/DL (ref 40–60)
LDLC SERPL CALC-MCNC: 70 MG/DL (ref 0–100)
MICROALBUMIN UR-MCNC: <3 UG/ML
POTASSIUM SERPL-SCNC: 4.3 MMOL/L (ref 3.5–5.2)
PROT SERPL-MCNC: 6.8 G/DL (ref 6–8.5)
SODIUM SERPL-SCNC: 139 MMOL/L (ref 136–145)
T4 FREE SERPL-MCNC: 1.44 NG/DL (ref 0.92–1.68)
TRIGL SERPL-MCNC: 97 MG/DL (ref 0–150)
TSH SERPL DL<=0.005 MIU/L-ACNC: 2.16 UIU/ML (ref 0.27–4.2)
VLDLC SERPL CALC-MCNC: 18 MG/DL (ref 5–40)

## 2025-06-29 DIAGNOSIS — E78.5 DYSLIPIDEMIA: ICD-10-CM

## 2025-07-01 RX ORDER — ROSUVASTATIN CALCIUM 20 MG/1
20 TABLET, COATED ORAL NIGHTLY
Qty: 90 TABLET | Refills: 1 | Status: SHIPPED | OUTPATIENT
Start: 2025-07-01

## 2025-07-13 DIAGNOSIS — E03.9 HYPOTHYROIDISM, UNSPECIFIED TYPE: ICD-10-CM

## 2025-07-14 RX ORDER — LEVOTHYROXINE SODIUM 100 UG/1
100 TABLET ORAL DAILY
Qty: 90 TABLET | Refills: 1 | Status: SHIPPED | OUTPATIENT
Start: 2025-07-14

## 2025-08-02 DIAGNOSIS — E11.65 TYPE 2 DIABETES MELLITUS WITH HYPERGLYCEMIA, WITH LONG-TERM CURRENT USE OF INSULIN: Chronic | ICD-10-CM

## 2025-08-02 DIAGNOSIS — Z79.4 TYPE 2 DIABETES MELLITUS WITH HYPERGLYCEMIA, WITH LONG-TERM CURRENT USE OF INSULIN: Chronic | ICD-10-CM

## 2025-08-02 DIAGNOSIS — I10 BENIGN ESSENTIAL HYPERTENSION: Chronic | ICD-10-CM

## 2025-08-04 RX ORDER — LISINOPRIL 30 MG/1
30 TABLET ORAL DAILY
Qty: 90 TABLET | Refills: 1 | Status: SHIPPED | OUTPATIENT
Start: 2025-08-04

## 2025-08-15 DIAGNOSIS — E11.65 TYPE 2 DIABETES MELLITUS WITH HYPERGLYCEMIA, WITH LONG-TERM CURRENT USE OF INSULIN: Chronic | ICD-10-CM

## 2025-08-15 DIAGNOSIS — Z79.4 TYPE 2 DIABETES MELLITUS WITH HYPERGLYCEMIA, WITH LONG-TERM CURRENT USE OF INSULIN: Chronic | ICD-10-CM

## 2025-08-15 RX ORDER — INSULIN DEGLUDEC 100 U/ML
INJECTION, SOLUTION SUBCUTANEOUS
Qty: 9 ML | Refills: 0 | Status: SHIPPED | OUTPATIENT
Start: 2025-08-15

## (undated) DEVICE — TBG 02 CRUSH RESIST LF CLR 7FT

## (undated) DEVICE — SYR CONTRL PRESS/LO FIX/M/LL W/THMB/RNG 10ML

## (undated) DEVICE — MEDI-VAC YANKAUER SUCTION HANDLE W/BULBOUS TIP: Brand: CARDINAL HEALTH

## (undated) DEVICE — TUBING, SUCTION, 1/4" X 20', STRAIGHT: Brand: MEDLINE INDUSTRIES, INC.

## (undated) DEVICE — CONTN STRL 32OZ

## (undated) DEVICE — ANTIBACTERIAL UNDYED BRAIDED (POLYGLACTIN 910), SYNTHETIC ABSORBABLE SUTURE: Brand: COATED VICRYL

## (undated) DEVICE — LOU LAVH: Brand: MEDLINE INDUSTRIES, INC.

## (undated) DEVICE — GLV SURG BIOGEL LTX PF 6 1/2

## (undated) DEVICE — MARKR SKIN W/RULR AND LBL

## (undated) DEVICE — CATHETER,FOLEY,100%SILICONE,16FR,10ML,LF: Brand: MEDLINE

## (undated) DEVICE — SOL NACL 0.9PCT 1000ML

## (undated) DEVICE — DEV SUT GRSPR CLOSUR 15CM 14G

## (undated) DEVICE — TRAP FLD MINIVAC MEGADYNE 100ML

## (undated) DEVICE — INTENDED FOR TISSUE SEPARATION, AND OTHER PROCEDURES THAT REQUIRE A SHARP SURGICAL BLADE TO PUNCTURE OR CUT.: Brand: BARD-PARKER ® CARBON RIB-BACK BLADES

## (undated) DEVICE — SUT VIC 0 TIES 18IN J912G

## (undated) DEVICE — ENDOPATH PNEUMONEEDLE INSUFFLATION NEEDLES WITH LUER LOCK CONNECTORS 120MM: Brand: ENDOPATH

## (undated) DEVICE — 3M™ STERI-DRAPE™ INSTRUMENT POUCH 1018L: Brand: STERI-DRAPE™

## (undated) DEVICE — KT ANTI FOG W/FLD AND SPNG

## (undated) DEVICE — TBG PENCL TELESCP MEGADYNE SMOKE EVAC 10FT

## (undated) DEVICE — LAPAROSCOPIC DISSECTOR: Brand: DEROYAL

## (undated) DEVICE — DRAPE,REIN 53X77,STERILE: Brand: MEDLINE

## (undated) DEVICE — STRIP,CLOSURE,WOUND,MEDI-STRIP,1/2X4: Brand: MEDLINE

## (undated) DEVICE — ENDOPATH XCEL BLADELESS TROCARS WITH STABILITY SLEEVES: Brand: ENDOPATH XCEL

## (undated) DEVICE — MAT FLR ABSORBENT LG 4FT 10 2.5FT

## (undated) DEVICE — TOWEL,OR,DSP,ST,BLUE,STD,4/PK,20PK/CS: Brand: MEDLINE

## (undated) DEVICE — COVER,LIGHT HANDLE,FLX,2/PK: Brand: MEDLINE INDUSTRIES, INC.

## (undated) DEVICE — PREMIUM DRY TRAY LF: Brand: MEDLINE INDUSTRIES, INC.

## (undated) DEVICE — SYR LL 3CC

## (undated) DEVICE — THE TORRENT IRRIGATION SCOPE CONNECTOR IS USED WITH THE TORRENT IRRIGATION TUBING TO PROVIDE IRRIGATION FLUIDS SUCH AS STERILE WATER DURING GASTROINTESTINAL ENDOSCOPIC PROCEDURES WHEN USED IN CONJUNCTION WITH AN IRRIGATION PUMP (OR ELECTROSURGICAL UNIT).: Brand: TORRENT

## (undated) DEVICE — ST IRR CYSTO W/SPK 77IN LF

## (undated) DEVICE — GOWN,SIRUS,NON REINFRCD,LARGE,SET IN SL: Brand: MEDLINE

## (undated) DEVICE — SINGLE BASIN: Brand: CARDINAL HEALTH

## (undated) DEVICE — NDL HYPO PRECISIONGLIDE REG 25G 1 1/2

## (undated) DEVICE — VAGINAL PACKING: Brand: DEROYAL

## (undated) DEVICE — SPNG LAP 18X18IN LF STRL PK/5

## (undated) DEVICE — CANN NASL CO2 TRULINK W/O2 A/

## (undated) DEVICE — DEV COND GAS LAP INSUFLOW W/LUER CONN

## (undated) DEVICE — SYR SLP TP 10ML DISP

## (undated) DEVICE — RUBBERBAND LF STRL PK/2

## (undated) DEVICE — TUBING, SUCTION, 1/4" X 10', STRAIGHT: Brand: MEDLINE

## (undated) DEVICE — APPL CHLORAPREP HI/LITE 26ML ORNG

## (undated) DEVICE — Device

## (undated) DEVICE — ENDOPATH XCEL WITH OPTIVIEW TECHNOLOGY BLADELESS TROCARS WITH STABILITY SLEEVES: Brand: ENDOPATH XCEL OPTIVIEW

## (undated) DEVICE — DISPOSABLE MONOPOLAR ENDOSCOPIC CORD 10 FT. (3M): Brand: KIRWAN

## (undated) DEVICE — DRSNG WND BORDR/ADHS NONADHR/GZ LF 2X2IN STRL

## (undated) DEVICE — ENDOPATH XCEL UNIVERSAL TROCAR STABLILITY SLEEVES: Brand: ENDOPATH XCEL

## (undated) DEVICE — DRAPE,SPLIT,CV,CLR ANES,STERILE: Brand: MEDLINE

## (undated) DEVICE — SUT VIC 2/0 CT2 27IN J269H

## (undated) DEVICE — LOU D & C HYSTEROSCOPY: Brand: MEDLINE INDUSTRIES, INC.

## (undated) DEVICE — PUNCH BIOP 2MM

## (undated) DEVICE — DRAPE,UNDERBUTTOCKS,PCH,STERILE: Brand: MEDLINE

## (undated) DEVICE — SYRINGE, LUER LOCK, 60ML: Brand: MEDLINE

## (undated) DEVICE — IRRIGATOR BULB ASEPTO 60CC STRL

## (undated) DEVICE — SMOKE EVACUATION TUBING WITH 7/8 IN TO 1/4 IN REDUCER: Brand: BUFFALO FILTER

## (undated) DEVICE — DRSNG TELFA PAD NONADH STR 1S 3X8IN

## (undated) DEVICE — Device: Brand: DEFENDO AIR/WATER/SUCTION AND BIOPSY VALVE

## (undated) DEVICE — LEGGINGS, PAIR, 31X48, STERILE: Brand: MEDLINE

## (undated) DEVICE — PCH SURG INVISISHIELD FLD/COL W/DRN/PRT 20X6IN

## (undated) DEVICE — SHEARS WITH ROTICULATOR TECHNOLOGY: Brand: ENDO MINI-SHEARS

## (undated) DEVICE — MAGNETIC INSTR DRAPE 20X16: Brand: MEDLINE INDUSTRIES, INC.

## (undated) DEVICE — COVER,MAYO STAND,STERILE: Brand: MEDLINE

## (undated) DEVICE — HARMONIC ACE +7 LAPAROSCOPIC SHEARS ADVANCED HEMOSTASIS 5MM DIAMETER 36CM SHAFT LENGTH  FOR USE WITH GRAY HAND PIECE ONLY: Brand: HARMONIC ACE

## (undated) DEVICE — 1000ML,PRESSURE INFUSER W/STOPCOCK: Brand: MEDLINE

## (undated) DEVICE — CONN TBG Y 5 IN 1 LF STRL

## (undated) DEVICE — PK HYST VAG 40

## (undated) DEVICE — TROCAR: Brand: KII OPTICAL ACCESS SYSTEM

## (undated) DEVICE — STRAP STIRUP WO/ RNG

## (undated) DEVICE — PCH INST SURG INVISISHIELD 2PCKT

## (undated) DEVICE — LAPAROVUE VISIBILITY SYSTEM LAPAROSCOPIC SOLUTIONS: Brand: LAPAROVUE

## (undated) DEVICE — LAPAROSCOPIC SMOKE ELIMINATION DEVICE: Brand: PNEUVIEW XE

## (undated) DEVICE — SUT PROLN 1 CT1 30IN 8425H

## (undated) DEVICE — PATIENT RETURN ELECTRODE, SINGLE-USE, CONTACT QUALITY MONITORING, ADULT, WITH 9FT CORD, FOR PATIENTS WEIGING OVER 33LBS. (15KG): Brand: MEGADYNE

## (undated) DEVICE — COUNT NDL MAG FM/BLCK 40COUNT